# Patient Record
Sex: FEMALE | Race: WHITE | NOT HISPANIC OR LATINO
[De-identification: names, ages, dates, MRNs, and addresses within clinical notes are randomized per-mention and may not be internally consistent; named-entity substitution may affect disease eponyms.]

---

## 2017-01-31 ENCOUNTER — APPOINTMENT (OUTPATIENT)
Dept: SURGERY | Facility: CLINIC | Age: 77
End: 2017-01-31

## 2017-06-01 ENCOUNTER — APPOINTMENT (OUTPATIENT)
Dept: SURGERY | Facility: CLINIC | Age: 77
End: 2017-06-01

## 2017-06-19 ENCOUNTER — APPOINTMENT (OUTPATIENT)
Dept: SURGICAL ONCOLOGY | Facility: CLINIC | Age: 77
End: 2017-06-19

## 2017-06-19 VITALS
HEIGHT: 64 IN | BODY MASS INDEX: 31.58 KG/M2 | DIASTOLIC BLOOD PRESSURE: 83 MMHG | SYSTOLIC BLOOD PRESSURE: 180 MMHG | OXYGEN SATURATION: 98 % | WEIGHT: 185 LBS | HEART RATE: 71 BPM

## 2017-09-19 ENCOUNTER — APPOINTMENT (OUTPATIENT)
Dept: SURGERY | Facility: CLINIC | Age: 77
End: 2017-09-19
Payer: MEDICARE

## 2017-09-19 PROCEDURE — 99213 OFFICE O/P EST LOW 20 MIN: CPT

## 2017-11-28 ENCOUNTER — APPOINTMENT (OUTPATIENT)
Dept: SURGERY | Facility: CLINIC | Age: 77
End: 2017-11-28
Payer: MEDICARE

## 2017-11-28 PROCEDURE — 41100 BIOPSY OF TONGUE: CPT

## 2017-11-28 PROCEDURE — 99213 OFFICE O/P EST LOW 20 MIN: CPT | Mod: 25

## 2017-12-05 ENCOUNTER — OTHER (OUTPATIENT)
Age: 77
End: 2017-12-05

## 2017-12-12 ENCOUNTER — FORM ENCOUNTER (OUTPATIENT)
Age: 77
End: 2017-12-12

## 2017-12-13 ENCOUNTER — OUTPATIENT (OUTPATIENT)
Dept: OUTPATIENT SERVICES | Facility: HOSPITAL | Age: 77
LOS: 1 days | End: 2017-12-13
Payer: MEDICARE

## 2017-12-13 ENCOUNTER — APPOINTMENT (OUTPATIENT)
Dept: NUCLEAR MEDICINE | Facility: IMAGING CENTER | Age: 77
End: 2017-12-13
Payer: MEDICARE

## 2017-12-13 DIAGNOSIS — Z98.89 OTHER SPECIFIED POSTPROCEDURAL STATES: Chronic | ICD-10-CM

## 2017-12-13 DIAGNOSIS — Z00.8 ENCOUNTER FOR OTHER GENERAL EXAMINATION: ICD-10-CM

## 2017-12-13 DIAGNOSIS — T84.7XXA INFECTION AND INFLAMMATORY REACTION DUE TO OTHER INTERNAL ORTHOPEDIC PROSTHETIC DEVICES, IMPLANTS AND GRAFTS, INITIAL ENCOUNTER: Chronic | ICD-10-CM

## 2017-12-13 PROCEDURE — 78815 PET IMAGE W/CT SKULL-THIGH: CPT | Mod: 26,PI

## 2017-12-13 PROCEDURE — A9552: CPT

## 2017-12-13 PROCEDURE — 78815 PET IMAGE W/CT SKULL-THIGH: CPT

## 2017-12-15 ENCOUNTER — OTHER (OUTPATIENT)
Age: 77
End: 2017-12-15

## 2018-01-11 ENCOUNTER — OUTPATIENT (OUTPATIENT)
Dept: OUTPATIENT SERVICES | Facility: HOSPITAL | Age: 78
LOS: 1 days | End: 2018-01-11
Payer: MEDICARE

## 2018-01-11 VITALS
HEART RATE: 78 BPM | WEIGHT: 188.05 LBS | SYSTOLIC BLOOD PRESSURE: 162 MMHG | DIASTOLIC BLOOD PRESSURE: 86 MMHG | OXYGEN SATURATION: 98 % | HEIGHT: 61 IN | TEMPERATURE: 98 F | RESPIRATION RATE: 14 BRPM

## 2018-01-11 DIAGNOSIS — C02.9 MALIGNANT NEOPLASM OF TONGUE, UNSPECIFIED: ICD-10-CM

## 2018-01-11 DIAGNOSIS — T84.7XXA INFECTION AND INFLAMMATORY REACTION DUE TO OTHER INTERNAL ORTHOPEDIC PROSTHETIC DEVICES, IMPLANTS AND GRAFTS, INITIAL ENCOUNTER: Chronic | ICD-10-CM

## 2018-01-11 DIAGNOSIS — G47.33 OBSTRUCTIVE SLEEP APNEA (ADULT) (PEDIATRIC): ICD-10-CM

## 2018-01-11 DIAGNOSIS — Z86.69 PERSONAL HISTORY OF OTHER DISEASES OF THE NERVOUS SYSTEM AND SENSE ORGANS: Chronic | ICD-10-CM

## 2018-01-11 DIAGNOSIS — Z98.89 OTHER SPECIFIED POSTPROCEDURAL STATES: Chronic | ICD-10-CM

## 2018-01-11 DIAGNOSIS — Z98.890 OTHER SPECIFIED POSTPROCEDURAL STATES: Chronic | ICD-10-CM

## 2018-01-11 DIAGNOSIS — E03.9 HYPOTHYROIDISM, UNSPECIFIED: ICD-10-CM

## 2018-01-11 LAB
ALBUMIN SERPL ELPH-MCNC: 4.2 G/DL — SIGNIFICANT CHANGE UP (ref 3.3–5)
ALP SERPL-CCNC: 64 U/L — SIGNIFICANT CHANGE UP (ref 40–120)
ALT FLD-CCNC: 16 U/L — SIGNIFICANT CHANGE UP (ref 4–33)
AST SERPL-CCNC: 21 U/L — SIGNIFICANT CHANGE UP (ref 4–32)
BILIRUB SERPL-MCNC: 0.2 MG/DL — SIGNIFICANT CHANGE UP (ref 0.2–1.2)
BLD GP AB SCN SERPL QL: NEGATIVE — SIGNIFICANT CHANGE UP
BUN SERPL-MCNC: 25 MG/DL — HIGH (ref 7–23)
CALCIUM SERPL-MCNC: 9.3 MG/DL — SIGNIFICANT CHANGE UP (ref 8.4–10.5)
CHLORIDE SERPL-SCNC: 104 MMOL/L — SIGNIFICANT CHANGE UP (ref 98–107)
CO2 SERPL-SCNC: 23 MMOL/L — SIGNIFICANT CHANGE UP (ref 22–31)
CREAT SERPL-MCNC: 0.98 MG/DL — SIGNIFICANT CHANGE UP (ref 0.5–1.3)
GLUCOSE SERPL-MCNC: 80 MG/DL — SIGNIFICANT CHANGE UP (ref 70–99)
HCT VFR BLD CALC: 40.9 % — SIGNIFICANT CHANGE UP (ref 34.5–45)
HGB BLD-MCNC: 13.4 G/DL — SIGNIFICANT CHANGE UP (ref 11.5–15.5)
MCHC RBC-ENTMCNC: 28.3 PG — SIGNIFICANT CHANGE UP (ref 27–34)
MCHC RBC-ENTMCNC: 32.8 % — SIGNIFICANT CHANGE UP (ref 32–36)
MCV RBC AUTO: 86.3 FL — SIGNIFICANT CHANGE UP (ref 80–100)
NRBC # FLD: 0 — SIGNIFICANT CHANGE UP
PLATELET # BLD AUTO: 198 K/UL — SIGNIFICANT CHANGE UP (ref 150–400)
PMV BLD: 11.5 FL — SIGNIFICANT CHANGE UP (ref 7–13)
POTASSIUM SERPL-MCNC: 4.3 MMOL/L — SIGNIFICANT CHANGE UP (ref 3.5–5.3)
POTASSIUM SERPL-SCNC: 4.3 MMOL/L — SIGNIFICANT CHANGE UP (ref 3.5–5.3)
PROT SERPL-MCNC: 7.3 G/DL — SIGNIFICANT CHANGE UP (ref 6–8.3)
RBC # BLD: 4.74 M/UL — SIGNIFICANT CHANGE UP (ref 3.8–5.2)
RBC # FLD: 13.9 % — SIGNIFICANT CHANGE UP (ref 10.3–14.5)
RH IG SCN BLD-IMP: POSITIVE — SIGNIFICANT CHANGE UP
SODIUM SERPL-SCNC: 142 MMOL/L — SIGNIFICANT CHANGE UP (ref 135–145)
TSH SERPL-MCNC: 0.86 UIU/ML — SIGNIFICANT CHANGE UP (ref 0.27–4.2)
WBC # BLD: 6.6 K/UL — SIGNIFICANT CHANGE UP (ref 3.8–10.5)
WBC # FLD AUTO: 6.6 K/UL — SIGNIFICANT CHANGE UP (ref 3.8–10.5)

## 2018-01-11 PROCEDURE — 93010 ELECTROCARDIOGRAM REPORT: CPT

## 2018-01-11 NOTE — H&P PST ADULT - PSH
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

## 2018-01-11 NOTE — H&P PST ADULT - PMH
Basal Cell Cancer  biopsy of nose  5/2010  History of radiation therapy  2008  Hyperlipidemia    Hypothyroid    Malignant neoplasm of mandible  left- 2008  Malignant neoplasm of tongue    Oral Cancer    Radiation therapy complication  burn to left neck area Basal Cell Cancer  biopsy of nose  5/2010  History of radiation therapy  2008  Hyperlipidemia    Hypothyroid    Malignant neoplasm of mandible  left- 2008  Malignant neoplasm of tongue    Oral Cancer    Radiation dermatitis  left neck  Radiation therapy complication  burn to left neck area

## 2018-01-11 NOTE — H&P PST ADULT - MUSCULOSKELETAL
details… detailed exam normal strength/no joint swelling/no joint warmth/no joint erythema/no calf tenderness

## 2018-01-11 NOTE — H&P PST ADULT - HISTORY OF PRESENT ILLNESS
This is a 77 y.o female with history of This is a 77 y.o female with history of cancer of the mandible with follow-up radiation 2008 . Pt reported discomfort of the tongue , biopsy done 11/2017. Pt had PET/ CT scan done. Pt has malignant neoplasm of tongue , unspecified. Pt now for surgery .

## 2018-01-11 NOTE — H&P PST ADULT - MS GEN HX ROS MEA POS PC
neck pain/left neck - turning to left  - mild limitation neck pain/neck decreased ROM upon extension and turning to left

## 2018-01-11 NOTE — H&P PST ADULT - TEACHING/LEARNING LEARNING PREFERENCES
individual instruction/pictorial/verbal instruction/video/written material/skill demonstration/group instruction/audio/computer/internet

## 2018-01-11 NOTE — H&P PST ADULT - RS GEN PE MLT RESP DETAILS PC
no wheezes/clear to auscultation bilaterally/respirations non-labored/good air movement/no rhonchi/breath sounds equal/no rales

## 2018-01-19 ENCOUNTER — OTHER (OUTPATIENT)
Age: 78
End: 2018-01-19

## 2018-01-19 ENCOUNTER — TRANSCRIPTION ENCOUNTER (OUTPATIENT)
Age: 78
End: 2018-01-19

## 2018-01-19 ENCOUNTER — OUTPATIENT (OUTPATIENT)
Dept: OUTPATIENT SERVICES | Facility: HOSPITAL | Age: 78
LOS: 1 days | Discharge: HOME | End: 2018-01-19
Payer: MEDICARE

## 2018-01-19 ENCOUNTER — RESULT REVIEW (OUTPATIENT)
Age: 78
End: 2018-01-19

## 2018-01-19 ENCOUNTER — APPOINTMENT (OUTPATIENT)
Dept: SURGERY | Facility: HOSPITAL | Age: 78
End: 2018-01-19

## 2018-01-19 VITALS
OXYGEN SATURATION: 100 % | HEART RATE: 70 BPM | TEMPERATURE: 98 F | DIASTOLIC BLOOD PRESSURE: 70 MMHG | RESPIRATION RATE: 16 BRPM | SYSTOLIC BLOOD PRESSURE: 166 MMHG

## 2018-01-19 VITALS
HEIGHT: 61 IN | TEMPERATURE: 98 F | OXYGEN SATURATION: 98 % | DIASTOLIC BLOOD PRESSURE: 58 MMHG | SYSTOLIC BLOOD PRESSURE: 146 MMHG | RESPIRATION RATE: 14 BRPM | HEART RATE: 68 BPM | WEIGHT: 188.05 LBS

## 2018-01-19 DIAGNOSIS — T84.7XXA INFECTION AND INFLAMMATORY REACTION DUE TO OTHER INTERNAL ORTHOPEDIC PROSTHETIC DEVICES, IMPLANTS AND GRAFTS, INITIAL ENCOUNTER: Chronic | ICD-10-CM

## 2018-01-19 DIAGNOSIS — C02.9 MALIGNANT NEOPLASM OF TONGUE, UNSPECIFIED: ICD-10-CM

## 2018-01-19 DIAGNOSIS — Z86.69 PERSONAL HISTORY OF OTHER DISEASES OF THE NERVOUS SYSTEM AND SENSE ORGANS: Chronic | ICD-10-CM

## 2018-01-19 DIAGNOSIS — Z98.89 OTHER SPECIFIED POSTPROCEDURAL STATES: Chronic | ICD-10-CM

## 2018-01-19 DIAGNOSIS — Z98.890 OTHER SPECIFIED POSTPROCEDURAL STATES: Chronic | ICD-10-CM

## 2018-01-19 PROCEDURE — 88309 TISSUE EXAM BY PATHOLOGIST: CPT | Mod: 26

## 2018-01-19 PROCEDURE — 13132 CMPLX RPR F/C/C/M/N/AX/G/H/F: CPT | Mod: 59

## 2018-01-19 PROCEDURE — 41120 PARTIAL REMOVAL OF TONGUE: CPT

## 2018-01-19 PROCEDURE — 41120 PARTIAL REMOVAL OF TONGUE: CPT | Mod: AS

## 2018-01-19 RX ORDER — OXYCODONE AND ACETAMINOPHEN 5; 325 MG/1; MG/1
1 TABLET ORAL
Qty: 20 | Refills: 0
Start: 2018-01-19 | End: 2018-01-23

## 2018-01-19 NOTE — ASU DISCHARGE PLAN (ADULT/PEDIATRIC). - NOTIFY
Bleeding that does not stop/Fever greater than 101/Swelling that continues/Persistent Nausea and Vomiting/Pain not relieved by Medications

## 2018-01-19 NOTE — ASU PREOPERATIVE ASSESSMENT, ADULT ([facility] ONLY) - TEACHING/LEARNING LEARNING PREFERENCES
audio/verbal instruction/individual instruction/written material/group instruction/pictorial/skill demonstration/computer/internet/video

## 2018-01-19 NOTE — BRIEF OPERATIVE NOTE - PROCEDURE
<<-----Click on this checkbox to enter Procedure Partial glossectomy on left  01/19/2018    Active  MONIKAL

## 2018-01-19 NOTE — PROGRESS NOTE ADULT - SUBJECTIVE AND OBJECTIVE BOX
Patient scheduled for resection Tongue mass-Seen in office-she is at LOW cardiac risk with no cardiac contraindications for the procedure.

## 2018-01-23 ENCOUNTER — APPOINTMENT (OUTPATIENT)
Dept: SURGERY | Facility: CLINIC | Age: 78
End: 2018-01-23

## 2018-01-23 LAB — SURGICAL PATHOLOGY STUDY: SIGNIFICANT CHANGE UP

## 2018-01-25 ENCOUNTER — APPOINTMENT (OUTPATIENT)
Dept: SURGERY | Facility: CLINIC | Age: 78
End: 2018-01-25
Payer: MEDICARE

## 2018-01-25 PROCEDURE — 99024 POSTOP FOLLOW-UP VISIT: CPT

## 2018-02-01 ENCOUNTER — APPOINTMENT (OUTPATIENT)
Dept: SURGERY | Facility: CLINIC | Age: 78
End: 2018-02-01
Payer: MEDICARE

## 2018-02-01 PROCEDURE — 99024 POSTOP FOLLOW-UP VISIT: CPT

## 2018-05-29 ENCOUNTER — APPOINTMENT (OUTPATIENT)
Dept: SURGERY | Facility: CLINIC | Age: 78
End: 2018-05-29
Payer: MEDICARE

## 2018-05-29 PROCEDURE — 99213 OFFICE O/P EST LOW 20 MIN: CPT

## 2018-06-25 ENCOUNTER — APPOINTMENT (OUTPATIENT)
Dept: SURGICAL ONCOLOGY | Facility: CLINIC | Age: 78
End: 2018-06-25
Payer: MEDICARE

## 2018-06-25 VITALS
OXYGEN SATURATION: 99 % | WEIGHT: 181 LBS | DIASTOLIC BLOOD PRESSURE: 81 MMHG | SYSTOLIC BLOOD PRESSURE: 187 MMHG | HEART RATE: 76 BPM | BODY MASS INDEX: 30.9 KG/M2 | HEIGHT: 64 IN

## 2018-06-25 PROCEDURE — 99213 OFFICE O/P EST LOW 20 MIN: CPT

## 2018-07-16 PROBLEM — L58.9 RADIODERMATITIS, UNSPECIFIED: Chronic | Status: ACTIVE | Noted: 2018-01-11

## 2018-07-17 ENCOUNTER — APPOINTMENT (OUTPATIENT)
Dept: SURGERY | Facility: CLINIC | Age: 78
End: 2018-07-17
Payer: MEDICARE

## 2018-07-17 PROCEDURE — 99213 OFFICE O/P EST LOW 20 MIN: CPT

## 2018-09-06 ENCOUNTER — APPOINTMENT (OUTPATIENT)
Dept: SURGERY | Facility: CLINIC | Age: 78
End: 2018-09-06
Payer: MEDICARE

## 2018-09-06 PROCEDURE — 99213 OFFICE O/P EST LOW 20 MIN: CPT

## 2018-10-02 ENCOUNTER — OTHER (OUTPATIENT)
Age: 78
End: 2018-10-02

## 2018-10-16 ENCOUNTER — APPOINTMENT (OUTPATIENT)
Dept: VASCULAR SURGERY | Facility: CLINIC | Age: 78
End: 2018-10-16
Payer: MEDICARE

## 2018-10-16 VITALS
HEIGHT: 64 IN | DIASTOLIC BLOOD PRESSURE: 86 MMHG | HEART RATE: 69 BPM | SYSTOLIC BLOOD PRESSURE: 188 MMHG | WEIGHT: 180 LBS | TEMPERATURE: 98 F | BODY MASS INDEX: 30.73 KG/M2

## 2018-10-16 DIAGNOSIS — L85.3 XEROSIS CUTIS: ICD-10-CM

## 2018-10-16 PROCEDURE — 93880 EXTRACRANIAL BILAT STUDY: CPT

## 2018-10-16 PROCEDURE — 99203 OFFICE O/P NEW LOW 30 MIN: CPT

## 2018-10-16 RX ORDER — AMMONIUM LACTATE 12 %
12 CREAM (GRAM) TOPICAL TWICE DAILY
Qty: 280 | Refills: 6 | Status: ACTIVE | COMMUNITY
Start: 2018-10-16 | End: 1900-01-01

## 2018-10-16 RX ORDER — GUAIFENESIN 600 MG/1
600 TABLET, MULTILAYER, EXTENDED RELEASE ORAL
Qty: 20 | Refills: 0 | Status: COMPLETED | COMMUNITY
Start: 2017-05-01 | End: 2018-10-16

## 2018-10-16 RX ORDER — OXYCODONE AND ACETAMINOPHEN 5; 325 MG/1; MG/1
5-325 TABLET ORAL
Qty: 20 | Refills: 0 | Status: COMPLETED | COMMUNITY
Start: 2018-01-19 | End: 2018-10-16

## 2018-10-16 RX ORDER — GABAPENTIN 100 MG/1
100 CAPSULE ORAL
Qty: 60 | Refills: 0 | Status: COMPLETED | COMMUNITY
Start: 2017-09-12 | End: 2018-10-16

## 2018-10-16 RX ORDER — FLUOROMETHOLONE 1 MG/G
0.1 OINTMENT OPHTHALMIC
Qty: 4 | Refills: 0 | Status: COMPLETED | COMMUNITY
Start: 2017-08-02 | End: 2018-10-16

## 2018-10-16 RX ORDER — PREDNISOLONE ACETATE 10 MG/ML
1 SUSPENSION/ DROPS OPHTHALMIC
Qty: 10 | Refills: 0 | Status: COMPLETED | COMMUNITY
Start: 2017-07-13 | End: 2018-10-16

## 2018-10-16 RX ORDER — AZITHROMYCIN 250 MG/1
250 TABLET, FILM COATED ORAL
Qty: 6 | Refills: 0 | Status: COMPLETED | COMMUNITY
Start: 2017-05-01 | End: 2018-10-16

## 2018-10-16 RX ORDER — CIPROFLOXACIN 3 MG/ML
0.3 SOLUTION OPHTHALMIC
Qty: 5 | Refills: 0 | Status: COMPLETED | COMMUNITY
Start: 2017-05-01 | End: 2018-10-16

## 2018-10-16 RX ORDER — DICLOFENAC POTASSIUM 50 MG/1
50 TABLET, COATED ORAL
Qty: 28 | Refills: 0 | Status: COMPLETED | COMMUNITY
Start: 2017-04-05 | End: 2018-10-16

## 2018-10-17 ENCOUNTER — APPOINTMENT (OUTPATIENT)
Dept: NEUROSURGERY | Facility: CLINIC | Age: 78
End: 2018-10-17
Payer: MEDICARE

## 2018-10-17 VITALS
WEIGHT: 180 LBS | HEIGHT: 64 IN | TEMPERATURE: 97.8 F | HEART RATE: 74 BPM | OXYGEN SATURATION: 95 % | SYSTOLIC BLOOD PRESSURE: 160 MMHG | RESPIRATION RATE: 15 BRPM | DIASTOLIC BLOOD PRESSURE: 97 MMHG | BODY MASS INDEX: 30.73 KG/M2

## 2018-10-17 DIAGNOSIS — I67.1 CEREBRAL ANEURYSM, NONRUPTURED: ICD-10-CM

## 2018-10-17 DIAGNOSIS — Z86.79 PERSONAL HISTORY OF OTHER DISEASES OF THE CIRCULATORY SYSTEM: ICD-10-CM

## 2018-10-17 PROCEDURE — 99205 OFFICE O/P NEW HI 60 MIN: CPT

## 2018-10-17 RX ORDER — ASPIRIN 81 MG
81 TABLET, DELAYED RELEASE (ENTERIC COATED) ORAL
Refills: 0 | Status: ACTIVE | COMMUNITY

## 2018-10-17 RX ORDER — LOSARTAN POTASSIUM 50 MG/1
50 TABLET, FILM COATED ORAL
Refills: 0 | Status: ACTIVE | COMMUNITY

## 2018-10-23 ENCOUNTER — APPOINTMENT (OUTPATIENT)
Dept: SURGERY | Facility: CLINIC | Age: 78
End: 2018-10-23
Payer: MEDICARE

## 2018-10-23 PROCEDURE — 99213 OFFICE O/P EST LOW 20 MIN: CPT

## 2018-12-13 ENCOUNTER — LABORATORY RESULT (OUTPATIENT)
Age: 78
End: 2018-12-13

## 2018-12-13 ENCOUNTER — APPOINTMENT (OUTPATIENT)
Dept: SURGERY | Facility: CLINIC | Age: 78
End: 2018-12-13
Payer: MEDICARE

## 2018-12-13 PROCEDURE — 11100 BX SKIN SUBCUTANEOUS&/MUCOUS MEMBRANE 1 LESION: CPT

## 2018-12-13 PROCEDURE — 99213 OFFICE O/P EST LOW 20 MIN: CPT | Mod: 25

## 2018-12-18 ENCOUNTER — OTHER (OUTPATIENT)
Age: 78
End: 2018-12-18

## 2019-01-15 ENCOUNTER — APPOINTMENT (OUTPATIENT)
Dept: VASCULAR SURGERY | Facility: CLINIC | Age: 79
End: 2019-01-15
Payer: MEDICARE

## 2019-01-15 VITALS
SYSTOLIC BLOOD PRESSURE: 184 MMHG | HEART RATE: 76 BPM | HEIGHT: 64 IN | TEMPERATURE: 97.9 F | WEIGHT: 180 LBS | BODY MASS INDEX: 30.73 KG/M2 | DIASTOLIC BLOOD PRESSURE: 72 MMHG

## 2019-01-15 PROCEDURE — 93880 EXTRACRANIAL BILAT STUDY: CPT

## 2019-01-15 PROCEDURE — 99213 OFFICE O/P EST LOW 20 MIN: CPT

## 2019-01-28 ENCOUNTER — TRANSCRIPTION ENCOUNTER (OUTPATIENT)
Age: 79
End: 2019-01-28

## 2019-01-31 ENCOUNTER — APPOINTMENT (OUTPATIENT)
Dept: SURGERY | Facility: CLINIC | Age: 79
End: 2019-01-31
Payer: MEDICARE

## 2019-01-31 PROCEDURE — 99213 OFFICE O/P EST LOW 20 MIN: CPT

## 2019-01-31 NOTE — HISTORY OF PRESENT ILLNESS
[de-identified] : 12    months  s/p partial glossectomy. denies pain, bleeding, dysphagia, hoarseness or new lesions.   no changes medically since last visit

## 2019-01-31 NOTE — PHYSICAL EXAM
[de-identified] : changes from prior surgery.   no palpable lesions.   [de-identified] : no palpable thyroid nodules [Laryngoscopy Performed] : laryngoscopy was performed, see procedure section for findings [Midline] : located in midline position [de-identified] : no evidence of recurrent disease. no new lesions noted [de-identified] : changes from prior surgery.  white changes right lower gingiva with no substance [Normal] : orientation to person, place, and time: normal

## 2019-05-23 ENCOUNTER — APPOINTMENT (OUTPATIENT)
Dept: SURGERY | Facility: CLINIC | Age: 79
End: 2019-05-23
Payer: MEDICARE

## 2019-05-23 PROCEDURE — 99213 OFFICE O/P EST LOW 20 MIN: CPT

## 2019-05-23 NOTE — PHYSICAL EXAM
[de-identified] : changes from prior surgery.   no palpable lesions.   [de-identified] : no palpable thyroid nodules [Laryngoscopy Performed] : laryngoscopy was performed, see procedure section for findings [Midline] : located in midline position [de-identified] : no evidence of recurrent disease. no new lesions noted [de-identified] : changes from prior surgery.  white changes right lower gingiva with no substance - no change since last visit [Normal] : orientation to person, place, and time: normal

## 2019-05-23 NOTE — ASSESSMENT
[FreeTextEntry1] : will observe. to return earlier if any change.   CT to r/o soft tissue mass causing discomfort. to call next week for results.

## 2019-05-23 NOTE — HISTORY OF PRESENT ILLNESS
[de-identified] : 16    months  s/p partial glossectomy. denies pain, bleeding, dysphagia, hoarseness or new lesions.   no changes medically since last visit .  notes left jaw and temple pressure

## 2019-05-29 ENCOUNTER — APPOINTMENT (OUTPATIENT)
Dept: VASCULAR SURGERY | Facility: CLINIC | Age: 79
End: 2019-05-29
Payer: MEDICARE

## 2019-05-29 VITALS
HEART RATE: 74 BPM | DIASTOLIC BLOOD PRESSURE: 83 MMHG | WEIGHT: 180 LBS | SYSTOLIC BLOOD PRESSURE: 168 MMHG | BODY MASS INDEX: 30.73 KG/M2 | TEMPERATURE: 98.5 F | HEIGHT: 64 IN

## 2019-05-29 PROCEDURE — 99213 OFFICE O/P EST LOW 20 MIN: CPT

## 2019-05-29 PROCEDURE — 93880 EXTRACRANIAL BILAT STUDY: CPT

## 2019-06-03 ENCOUNTER — FORM ENCOUNTER (OUTPATIENT)
Age: 79
End: 2019-06-03

## 2019-06-04 ENCOUNTER — OUTPATIENT (OUTPATIENT)
Dept: OUTPATIENT SERVICES | Facility: HOSPITAL | Age: 79
LOS: 1 days | End: 2019-06-04
Payer: MEDICARE

## 2019-06-04 ENCOUNTER — APPOINTMENT (OUTPATIENT)
Dept: CT IMAGING | Facility: IMAGING CENTER | Age: 79
End: 2019-06-04
Payer: MEDICARE

## 2019-06-04 DIAGNOSIS — Z98.890 OTHER SPECIFIED POSTPROCEDURAL STATES: Chronic | ICD-10-CM

## 2019-06-04 DIAGNOSIS — T84.7XXA INFECTION AND INFLAMMATORY REACTION DUE TO OTHER INTERNAL ORTHOPEDIC PROSTHETIC DEVICES, IMPLANTS AND GRAFTS, INITIAL ENCOUNTER: Chronic | ICD-10-CM

## 2019-06-04 DIAGNOSIS — C02.9 MALIGNANT NEOPLASM OF TONGUE, UNSPECIFIED: ICD-10-CM

## 2019-06-04 DIAGNOSIS — Z98.89 OTHER SPECIFIED POSTPROCEDURAL STATES: Chronic | ICD-10-CM

## 2019-06-04 DIAGNOSIS — Z86.69 PERSONAL HISTORY OF OTHER DISEASES OF THE NERVOUS SYSTEM AND SENSE ORGANS: Chronic | ICD-10-CM

## 2019-06-04 PROCEDURE — 70491 CT SOFT TISSUE NECK W/DYE: CPT | Mod: 26

## 2019-06-04 PROCEDURE — 70491 CT SOFT TISSUE NECK W/DYE: CPT

## 2019-06-11 ENCOUNTER — OTHER (OUTPATIENT)
Age: 79
End: 2019-06-11

## 2019-06-18 ENCOUNTER — TRANSCRIPTION ENCOUNTER (OUTPATIENT)
Age: 79
End: 2019-06-18

## 2019-06-19 ENCOUNTER — FORM ENCOUNTER (OUTPATIENT)
Age: 79
End: 2019-06-19

## 2019-06-20 ENCOUNTER — OUTPATIENT (OUTPATIENT)
Dept: OUTPATIENT SERVICES | Facility: HOSPITAL | Age: 79
LOS: 1 days | End: 2019-06-20
Payer: MEDICARE

## 2019-06-20 ENCOUNTER — APPOINTMENT (OUTPATIENT)
Dept: ULTRASOUND IMAGING | Facility: IMAGING CENTER | Age: 79
End: 2019-06-20
Payer: MEDICARE

## 2019-06-20 ENCOUNTER — APPOINTMENT (OUTPATIENT)
Dept: SURGERY | Facility: CLINIC | Age: 79
End: 2019-06-20
Payer: MEDICARE

## 2019-06-20 DIAGNOSIS — T84.7XXA INFECTION AND INFLAMMATORY REACTION DUE TO OTHER INTERNAL ORTHOPEDIC PROSTHETIC DEVICES, IMPLANTS AND GRAFTS, INITIAL ENCOUNTER: Chronic | ICD-10-CM

## 2019-06-20 DIAGNOSIS — Z98.890 OTHER SPECIFIED POSTPROCEDURAL STATES: Chronic | ICD-10-CM

## 2019-06-20 DIAGNOSIS — Z86.69 PERSONAL HISTORY OF OTHER DISEASES OF THE NERVOUS SYSTEM AND SENSE ORGANS: Chronic | ICD-10-CM

## 2019-06-20 DIAGNOSIS — Z98.89 OTHER SPECIFIED POSTPROCEDURAL STATES: Chronic | ICD-10-CM

## 2019-06-20 DIAGNOSIS — R22.1 LOCALIZED SWELLING, MASS AND LUMP, NECK: ICD-10-CM

## 2019-06-20 PROCEDURE — 36415 COLL VENOUS BLD VENIPUNCTURE: CPT

## 2019-06-20 PROCEDURE — 76536 US EXAM OF HEAD AND NECK: CPT

## 2019-06-20 PROCEDURE — 76536 US EXAM OF HEAD AND NECK: CPT | Mod: 26

## 2019-06-21 LAB
T3 SERPL-MCNC: 83 NG/DL
T4 FREE SERPL-MCNC: 1.7 NG/DL
TSH SERPL-ACNC: 0.45 UIU/ML

## 2019-06-23 LAB
24R-OH-CALCIDIOL SERPL-MCNC: 40.7 PG/ML
CALCIUM SERPL-MCNC: 9.9 MG/DL
CALCIUM SERPL-MCNC: 9.9 MG/DL
PARATHYROID HORMONE INTACT: 58 PG/ML
THYROGLOB AB SERPL-ACNC: <20 IU/ML
THYROPEROXIDASE AB SERPL IA-ACNC: 69.8 IU/ML

## 2019-06-24 ENCOUNTER — APPOINTMENT (OUTPATIENT)
Dept: SURGICAL ONCOLOGY | Facility: CLINIC | Age: 79
End: 2019-06-24
Payer: MEDICARE

## 2019-06-24 VITALS
DIASTOLIC BLOOD PRESSURE: 80 MMHG | RESPIRATION RATE: 15 BRPM | SYSTOLIC BLOOD PRESSURE: 179 MMHG | TEMPERATURE: 97.5 F | HEIGHT: 64 IN | HEART RATE: 73 BPM | OXYGEN SATURATION: 96 %

## 2019-06-24 DIAGNOSIS — N60.99 UNSPECIFIED BENIGN MAMMARY DYSPLASIA OF UNSPECIFIED BREAST: ICD-10-CM

## 2019-06-24 PROCEDURE — 99214 OFFICE O/P EST MOD 30 MIN: CPT

## 2019-06-25 ENCOUNTER — OTHER (OUTPATIENT)
Age: 79
End: 2019-06-25

## 2019-07-08 ENCOUNTER — FORM ENCOUNTER (OUTPATIENT)
Age: 79
End: 2019-07-08

## 2019-07-09 ENCOUNTER — RESULT REVIEW (OUTPATIENT)
Age: 79
End: 2019-07-09

## 2019-07-09 ENCOUNTER — APPOINTMENT (OUTPATIENT)
Dept: ULTRASOUND IMAGING | Facility: IMAGING CENTER | Age: 79
End: 2019-07-09
Payer: MEDICARE

## 2019-07-09 ENCOUNTER — OUTPATIENT (OUTPATIENT)
Dept: OUTPATIENT SERVICES | Facility: HOSPITAL | Age: 79
LOS: 1 days | End: 2019-07-09
Payer: MEDICARE

## 2019-07-09 DIAGNOSIS — R22.1 LOCALIZED SWELLING, MASS AND LUMP, NECK: ICD-10-CM

## 2019-07-09 DIAGNOSIS — Z98.89 OTHER SPECIFIED POSTPROCEDURAL STATES: Chronic | ICD-10-CM

## 2019-07-09 DIAGNOSIS — Z98.890 OTHER SPECIFIED POSTPROCEDURAL STATES: Chronic | ICD-10-CM

## 2019-07-09 DIAGNOSIS — T84.7XXA INFECTION AND INFLAMMATORY REACTION DUE TO OTHER INTERNAL ORTHOPEDIC PROSTHETIC DEVICES, IMPLANTS AND GRAFTS, INITIAL ENCOUNTER: Chronic | ICD-10-CM

## 2019-07-09 DIAGNOSIS — Z86.69 PERSONAL HISTORY OF OTHER DISEASES OF THE NERVOUS SYSTEM AND SENSE ORGANS: Chronic | ICD-10-CM

## 2019-07-09 PROCEDURE — 10005 FNA BX W/US GDN 1ST LES: CPT

## 2019-07-16 LAB — NON-GYNECOLOGICAL CYTOLOGY STUDY: SIGNIFICANT CHANGE UP

## 2019-07-18 ENCOUNTER — OTHER (OUTPATIENT)
Age: 79
End: 2019-07-18

## 2019-07-25 ENCOUNTER — APPOINTMENT (OUTPATIENT)
Dept: SURGERY | Facility: CLINIC | Age: 79
End: 2019-07-25
Payer: MEDICARE

## 2019-07-25 PROCEDURE — 99213 OFFICE O/P EST LOW 20 MIN: CPT

## 2019-07-25 NOTE — PHYSICAL EXAM
[de-identified] : changes from prior surgery.   no palpable lesions.   [de-identified] : no palpable thyroid nodules [Laryngoscopy Performed] : laryngoscopy was performed, see procedure section for findings [Midline] : located in midline position [de-identified] : no evidence of recurrent disease. no new lesions noted [de-identified] : changes from prior surgery.  white changes right lower gingiva with no substance - no change since last visit.  1 mm area exposed bone left floor of mouth [Normal] : orientation to person, place, and time: normal

## 2019-07-25 NOTE — ASSESSMENT
[FreeTextEntry1] : will observe. to return earlier if any change.  asked to be evaluated by dr hernández

## 2019-07-25 NOTE — HISTORY OF PRESENT ILLNESS
[de-identified] : 18     months  s/p partial glossectomy. denies pain, bleeding, dysphagia, hoarseness or new lesions.   no changes medically since last visit .  notes left jaw and temple pressure  and new floor of mouth lesion

## 2019-08-05 NOTE — HISTORY OF PRESENT ILLNESS
[de-identified] : 78 year-old lady who we have followed for periodic breast examinations since March 2000. \par I have been seeing her since March 2001.\par We currently see her annually.\par \par +FH:\par A paternal aunt had breast cancer.\par \par A right breast biopsy in 1984 was benign.\par \par She has no signs or symptoms related to either breast\par \par Her menarche was at age 12.\par She has had 4 pregnancies, with 3 deliveries, the first at age 25.\par \par Breast cancer risk is 12.4\par \par Her internist is Anthony Bobby\par \par She has bilateral carotid artery stenosis.\par She sees Dr. Britton Polanco (vascular surgery).\par She is on medications for hyperlipidemia.\par Her cervical anatomy makes either open surgical, or endovascular approach technically difficult.\par \par Baby aspirin daily\par Blood pressure is controlled with losartan.\par Simvastatin for hyperlipidemia\par She also takes Synthroid.\par \par She is seeing Dr. Edis Tanner (neurosurgery) for a basal artery aneurysm, and a meningioma, both of which are being followed clinically.\par Her neurologist is Dr. Radha Swartz\par \par \par +TONGUE CANCER (Below)\par She sees Dr. Andrews Ewing, May 2019 visit was unremarkable\par PET scan pending\par \par Her gynecologist is Dr. Selena ANDRE, reluctant to f/u\par \par Her father had colorectal cancer.\par Her colonoscopy, declines\par \par In July 2008 she had a partial glossectomy by Dr. Ewing and Maximo, at [facility].\par Postoperative chemotherapy was administered by Dr. Flynn.\par She had irradiation that [facility], but does not recall the physician's name\par January 2018 she had surgery for a recurrence of her total cancer; she did not require any postoperative treatment.

## 2019-08-05 NOTE — ASSESSMENT
[FreeTextEntry1] : Annual breast imaging was at [facility],..: 6-11-18: BI-RADS 2:\par June 2019 imaging was unremarkable, await official report..: 06/12/19:\par Bilateral mammogram and breast sono at [facility]: BI-RADS 2\par Prescription provided for June 2020\par \par Clinically she appears to be doing well and asked to see her in another year, sooner if needed

## 2019-08-05 NOTE — REVIEW OF SYSTEMS
[Negative] : Heme/Lymph [FreeTextEntry4] : Tongue cancer [de-identified] : Meningioma [FreeTextEntry5] : Hypertension

## 2019-08-05 NOTE — PHYSICAL EXAM
[Normal] : supple, no neck mass and thyroid not enlarged [Normal Supraclavicular Lymph Nodes] : normal supraclavicular lymph nodes [Normal Neck Lymph Nodes] : normal neck lymph nodes  [Normal Axillary Lymph Nodes] : normal axillary lymph nodes [Normal] : full range of motion and no deformities appreciated [de-identified] : Groins not examined [de-identified] : Below

## 2019-10-03 ENCOUNTER — APPOINTMENT (OUTPATIENT)
Dept: SURGERY | Facility: CLINIC | Age: 79
End: 2019-10-03
Payer: MEDICARE

## 2019-10-03 PROCEDURE — 99213 OFFICE O/P EST LOW 20 MIN: CPT

## 2019-10-03 NOTE — HISTORY OF PRESENT ILLNESS
[de-identified] : 21     months  s/p partial glossectomy. denies pain, bleeding, dysphagia, hoarseness or new lesions.   no changes medically since last visit .  jaw discomfort due to loose segment of bone per dr hernández

## 2019-10-03 NOTE — PHYSICAL EXAM
[de-identified] : changes from prior surgery.   no palpable lesions.   [de-identified] : no palpable thyroid nodules [Laryngoscopy Performed] : laryngoscopy was performed, see procedure section for findings [Midline] : located in midline position [de-identified] : no evidence of recurrent disease. no new lesions noted [de-identified] : changes from prior surgery.  white changes right lower gingiva with no substance - no change since last visit.  1 mm area exposed bone left floor of mouth.  bone segment mobile [Normal] : orientation to person, place, and time: normal

## 2019-10-16 ENCOUNTER — APPOINTMENT (OUTPATIENT)
Dept: VASCULAR SURGERY | Facility: CLINIC | Age: 79
End: 2019-10-16
Payer: MEDICARE

## 2019-10-16 VITALS
DIASTOLIC BLOOD PRESSURE: 80 MMHG | SYSTOLIC BLOOD PRESSURE: 207 MMHG | HEIGHT: 64 IN | HEART RATE: 71 BPM | BODY MASS INDEX: 32.44 KG/M2 | WEIGHT: 190 LBS

## 2019-10-16 PROCEDURE — 93880 EXTRACRANIAL BILAT STUDY: CPT

## 2019-10-16 PROCEDURE — 99213 OFFICE O/P EST LOW 20 MIN: CPT

## 2019-12-12 ENCOUNTER — OTHER (OUTPATIENT)
Age: 79
End: 2019-12-12

## 2020-01-05 ENCOUNTER — FORM ENCOUNTER (OUTPATIENT)
Age: 80
End: 2020-01-05

## 2020-01-06 ENCOUNTER — APPOINTMENT (OUTPATIENT)
Dept: CT IMAGING | Facility: IMAGING CENTER | Age: 80
End: 2020-01-06
Payer: MEDICARE

## 2020-01-06 ENCOUNTER — OUTPATIENT (OUTPATIENT)
Dept: OUTPATIENT SERVICES | Facility: HOSPITAL | Age: 80
LOS: 1 days | End: 2020-01-06
Payer: MEDICARE

## 2020-01-06 DIAGNOSIS — C02.9 MALIGNANT NEOPLASM OF TONGUE, UNSPECIFIED: ICD-10-CM

## 2020-01-06 DIAGNOSIS — Z98.89 OTHER SPECIFIED POSTPROCEDURAL STATES: Chronic | ICD-10-CM

## 2020-01-06 DIAGNOSIS — T84.7XXA INFECTION AND INFLAMMATORY REACTION DUE TO OTHER INTERNAL ORTHOPEDIC PROSTHETIC DEVICES, IMPLANTS AND GRAFTS, INITIAL ENCOUNTER: Chronic | ICD-10-CM

## 2020-01-06 DIAGNOSIS — Z98.890 OTHER SPECIFIED POSTPROCEDURAL STATES: Chronic | ICD-10-CM

## 2020-01-06 DIAGNOSIS — Z86.69 PERSONAL HISTORY OF OTHER DISEASES OF THE NERVOUS SYSTEM AND SENSE ORGANS: Chronic | ICD-10-CM

## 2020-01-06 PROCEDURE — 70491 CT SOFT TISSUE NECK W/DYE: CPT | Mod: 26

## 2020-01-06 PROCEDURE — 82565 ASSAY OF CREATININE: CPT

## 2020-01-06 PROCEDURE — 70491 CT SOFT TISSUE NECK W/DYE: CPT

## 2020-01-09 ENCOUNTER — APPOINTMENT (OUTPATIENT)
Dept: SURGERY | Facility: CLINIC | Age: 80
End: 2020-01-09
Payer: MEDICARE

## 2020-01-09 PROCEDURE — 99213 OFFICE O/P EST LOW 20 MIN: CPT

## 2020-01-09 NOTE — HISTORY OF PRESENT ILLNESS
[de-identified] : 24     months  s/p partial glossectomy. denies pain, bleeding, dysphagia, hoarseness or new lesions.   no changes medically since last visit .  jaw discomfort due to loose segment of bone per dr hernández. recent CT stable

## 2020-01-09 NOTE — PHYSICAL EXAM
[de-identified] : no palpable thyroid nodules [de-identified] : changes from prior surgery.   no palpable lesions.   [Laryngoscopy Performed] : laryngoscopy was performed, see procedure section for findings [Midline] : located in midline position [de-identified] : no evidence of recurrent disease. no new lesions noted [de-identified] : changes from prior surgery.  white changes right lower gingiva with no substance - no change since last visit.  1 mm area exposed bone left floor of mouth.  bone segment mobile [Normal] : orientation to person, place, and time: normal

## 2020-01-27 ENCOUNTER — APPOINTMENT (OUTPATIENT)
Dept: VASCULAR SURGERY | Facility: CLINIC | Age: 80
End: 2020-01-27
Payer: MEDICARE

## 2020-01-27 VITALS
TEMPERATURE: 98.4 F | HEART RATE: 70 BPM | DIASTOLIC BLOOD PRESSURE: 98 MMHG | BODY MASS INDEX: 31.92 KG/M2 | WEIGHT: 187 LBS | HEIGHT: 64 IN | SYSTOLIC BLOOD PRESSURE: 194 MMHG

## 2020-01-27 PROCEDURE — 93880 EXTRACRANIAL BILAT STUDY: CPT

## 2020-01-27 PROCEDURE — 99213 OFFICE O/P EST LOW 20 MIN: CPT

## 2020-02-04 ENCOUNTER — TRANSCRIPTION ENCOUNTER (OUTPATIENT)
Age: 80
End: 2020-02-04

## 2020-06-08 ENCOUNTER — APPOINTMENT (OUTPATIENT)
Dept: VASCULAR SURGERY | Facility: CLINIC | Age: 80
End: 2020-06-08

## 2020-06-25 ENCOUNTER — APPOINTMENT (OUTPATIENT)
Dept: SURGERY | Facility: CLINIC | Age: 80
End: 2020-06-25
Payer: MEDICARE

## 2020-06-25 PROCEDURE — 99213 OFFICE O/P EST LOW 20 MIN: CPT

## 2020-06-25 NOTE — HISTORY OF PRESENT ILLNESS
[de-identified] : 30   months  s/p partial glossectomy. denies pain, bleeding, dysphagia, hoarseness or new lesions.   no changes medically since last visit .  jaw discomfort due to loose segment of bone per dr hernández. last CT stable

## 2020-06-25 NOTE — PHYSICAL EXAM
[de-identified] : changes from prior surgery.   no palpable lesions.   [de-identified] : no palpable thyroid nodules [Midline] : located in midline position [Laryngoscopy Performed] : laryngoscopy was performed, see procedure section for findings [de-identified] : no evidence of recurrent disease. no new lesions noted [de-identified] : changes from prior surgery.  white changes right lower gingiva with no substance - no change since last visit.  1 mm area exposed bone left floor of mouth.  bone segment mobile [Normal] : orientation to person, place, and time: normal

## 2020-06-25 NOTE — ASSESSMENT
[FreeTextEntry1] : will observe. to return earlier if any change.  CT requested. to call next week for results.

## 2020-06-29 ENCOUNTER — APPOINTMENT (OUTPATIENT)
Dept: SURGICAL ONCOLOGY | Facility: CLINIC | Age: 80
End: 2020-06-29

## 2020-07-06 ENCOUNTER — APPOINTMENT (OUTPATIENT)
Dept: CT IMAGING | Facility: IMAGING CENTER | Age: 80
End: 2020-07-06
Payer: MEDICARE

## 2020-07-06 ENCOUNTER — OUTPATIENT (OUTPATIENT)
Dept: OUTPATIENT SERVICES | Facility: HOSPITAL | Age: 80
LOS: 1 days | End: 2020-07-06
Payer: MEDICARE

## 2020-07-06 DIAGNOSIS — Z98.890 OTHER SPECIFIED POSTPROCEDURAL STATES: Chronic | ICD-10-CM

## 2020-07-06 DIAGNOSIS — C03.9 MALIGNANT NEOPLASM OF GUM, UNSPECIFIED: ICD-10-CM

## 2020-07-06 DIAGNOSIS — T84.7XXA INFECTION AND INFLAMMATORY REACTION DUE TO OTHER INTERNAL ORTHOPEDIC PROSTHETIC DEVICES, IMPLANTS AND GRAFTS, INITIAL ENCOUNTER: Chronic | ICD-10-CM

## 2020-07-06 DIAGNOSIS — Z98.89 OTHER SPECIFIED POSTPROCEDURAL STATES: Chronic | ICD-10-CM

## 2020-07-06 DIAGNOSIS — Z86.69 PERSONAL HISTORY OF OTHER DISEASES OF THE NERVOUS SYSTEM AND SENSE ORGANS: Chronic | ICD-10-CM

## 2020-07-06 PROCEDURE — 82565 ASSAY OF CREATININE: CPT

## 2020-07-06 PROCEDURE — 70491 CT SOFT TISSUE NECK W/DYE: CPT

## 2020-07-06 PROCEDURE — 70491 CT SOFT TISSUE NECK W/DYE: CPT | Mod: 26

## 2020-07-31 ENCOUNTER — APPOINTMENT (OUTPATIENT)
Dept: VASCULAR SURGERY | Facility: CLINIC | Age: 80
End: 2020-07-31
Payer: MEDICARE

## 2020-07-31 PROCEDURE — 93880 EXTRACRANIAL BILAT STUDY: CPT

## 2020-08-06 ENCOUNTER — APPOINTMENT (OUTPATIENT)
Dept: VASCULAR SURGERY | Facility: CLINIC | Age: 80
End: 2020-08-06
Payer: MEDICARE

## 2020-08-06 PROCEDURE — 99447 NTRPROF PH1/NTRNET/EHR 11-20: CPT

## 2020-08-27 ENCOUNTER — TRANSCRIPTION ENCOUNTER (OUTPATIENT)
Age: 80
End: 2020-08-27

## 2020-10-08 ENCOUNTER — APPOINTMENT (OUTPATIENT)
Dept: SURGERY | Facility: CLINIC | Age: 80
End: 2020-10-08
Payer: MEDICARE

## 2020-10-08 PROCEDURE — 99213 OFFICE O/P EST LOW 20 MIN: CPT

## 2020-10-08 NOTE — ASSESSMENT
[FreeTextEntry1] : will observe. to return earlier if any change. to see dr Shepherd for consideration of esophageal dilation

## 2020-10-08 NOTE — PHYSICAL EXAM
[de-identified] : changes from prior surgery.   no palpable lesions.   [de-identified] : no palpable thyroid nodules [Laryngoscopy Performed] : laryngoscopy was performed, see procedure section for findings [Midline] : located in midline position [de-identified] : no evidence of recurrent disease. no new lesions noted [de-identified] : changes from prior surgery.  white changes right lower gingiva with no substance - no change since last visit.  1 mm area exposed bone left floor of mouth.  bone segment mobile [Normal] : orientation to person, place, and time: normal

## 2020-10-08 NOTE — HISTORY OF PRESENT ILLNESS
[de-identified] : 33   months  s/p partial glossectomy. denies pain, bleeding, dysphagia, hoarseness or new lesions.   no changes medically since last visit .  jaw discomfort due to loose segment of bone per dr hernández. last CT stable.  carotid atherosclerosis being followed by dr pena

## 2020-10-14 ENCOUNTER — APPOINTMENT (OUTPATIENT)
Dept: VASCULAR SURGERY | Facility: CLINIC | Age: 80
End: 2020-10-14
Payer: MEDICARE

## 2020-10-14 VITALS
BODY MASS INDEX: 30.73 KG/M2 | HEIGHT: 64 IN | DIASTOLIC BLOOD PRESSURE: 93 MMHG | TEMPERATURE: 98.1 F | WEIGHT: 180 LBS | SYSTOLIC BLOOD PRESSURE: 220 MMHG | HEART RATE: 78 BPM

## 2020-10-14 PROCEDURE — 93880 EXTRACRANIAL BILAT STUDY: CPT

## 2020-10-14 PROCEDURE — 99212 OFFICE O/P EST SF 10 MIN: CPT

## 2020-10-14 RX ORDER — SIMVASTATIN 40 MG/1
40 TABLET, FILM COATED ORAL
Qty: 30 | Refills: 6 | Status: ACTIVE | COMMUNITY
Start: 2020-10-14 | End: 1900-01-01

## 2020-12-08 ENCOUNTER — TRANSCRIPTION ENCOUNTER (OUTPATIENT)
Age: 80
End: 2020-12-08

## 2020-12-09 ENCOUNTER — APPOINTMENT (OUTPATIENT)
Dept: VASCULAR SURGERY | Facility: CLINIC | Age: 80
End: 2020-12-09
Payer: MEDICARE

## 2020-12-09 VITALS
SYSTOLIC BLOOD PRESSURE: 166 MMHG | HEART RATE: 70 BPM | BODY MASS INDEX: 30.73 KG/M2 | WEIGHT: 180 LBS | DIASTOLIC BLOOD PRESSURE: 91 MMHG | TEMPERATURE: 98 F | HEIGHT: 64 IN

## 2020-12-09 PROCEDURE — 99212 OFFICE O/P EST SF 10 MIN: CPT

## 2020-12-09 PROCEDURE — 93880 EXTRACRANIAL BILAT STUDY: CPT

## 2020-12-14 NOTE — H&P PST ADULT - GEN GEN HX ROS MEA POS PC
Detail Level: Simple Additional Notes: Patient consent was obtained to proceed with the visit and recommended plan of care after discussion of all risks and benefits, including the risks of COVID-19 exposure. weight gain of 30 pounds over the last year/weight gain

## 2021-01-20 ENCOUNTER — APPOINTMENT (OUTPATIENT)
Dept: VASCULAR SURGERY | Facility: CLINIC | Age: 81
End: 2021-01-20

## 2021-01-28 ENCOUNTER — APPOINTMENT (OUTPATIENT)
Dept: SURGERY | Facility: CLINIC | Age: 81
End: 2021-01-28
Payer: MEDICARE

## 2021-01-28 PROCEDURE — 99214 OFFICE O/P EST MOD 30 MIN: CPT

## 2021-01-28 NOTE — PHYSICAL EXAM
[de-identified] : changes from prior surgery.   no palpable lesions.   [de-identified] : no palpable thyroid nodules [Laryngoscopy Performed] : laryngoscopy was performed, see procedure section for findings [Midline] : located in midline position [de-identified] : no evidence of recurrent disease. no new lesions noted [de-identified] : changes from prior surgery.  white changes right lower gingiva with no substance - no change since last visit.  1 mm area exposed bone left floor of mouth.  bone segment mobile [Normal] : orientation to person, place, and time: normal

## 2021-01-28 NOTE — ASSESSMENT
[FreeTextEntry1] : will observe. to return earlier if any change.  no indication for any studies at this time

## 2021-01-28 NOTE — HISTORY OF PRESENT ILLNESS
[de-identified] : 35   months  s/p partial glossectomy. denies pain, bleeding, dysphagia, hoarseness or new lesions.   no changes medically since last visit .   last CT stable.  carotid atherosclerosis being followed by dr pena - considering stenting of lesion.  I have reviewed all old and new data and available images.  Additional information was obtained from others present at the time of visit to ensure the completeness of the history\par

## 2021-02-13 ENCOUNTER — APPOINTMENT (OUTPATIENT)
Dept: CT IMAGING | Facility: IMAGING CENTER | Age: 81
End: 2021-02-13
Payer: MEDICARE

## 2021-02-13 ENCOUNTER — RESULT REVIEW (OUTPATIENT)
Age: 81
End: 2021-02-13

## 2021-02-13 ENCOUNTER — OUTPATIENT (OUTPATIENT)
Dept: OUTPATIENT SERVICES | Facility: HOSPITAL | Age: 81
LOS: 1 days | End: 2021-02-13
Payer: MEDICARE

## 2021-02-13 DIAGNOSIS — Z86.69 PERSONAL HISTORY OF OTHER DISEASES OF THE NERVOUS SYSTEM AND SENSE ORGANS: Chronic | ICD-10-CM

## 2021-02-13 DIAGNOSIS — T84.7XXA INFECTION AND INFLAMMATORY REACTION DUE TO OTHER INTERNAL ORTHOPEDIC PROSTHETIC DEVICES, IMPLANTS AND GRAFTS, INITIAL ENCOUNTER: Chronic | ICD-10-CM

## 2021-02-13 DIAGNOSIS — Z00.8 ENCOUNTER FOR OTHER GENERAL EXAMINATION: ICD-10-CM

## 2021-02-13 DIAGNOSIS — Z98.890 OTHER SPECIFIED POSTPROCEDURAL STATES: Chronic | ICD-10-CM

## 2021-02-13 DIAGNOSIS — Z98.89 OTHER SPECIFIED POSTPROCEDURAL STATES: Chronic | ICD-10-CM

## 2021-02-13 PROCEDURE — 70498 CT ANGIOGRAPHY NECK: CPT

## 2021-02-13 PROCEDURE — 70496 CT ANGIOGRAPHY HEAD: CPT | Mod: 26,MH

## 2021-02-13 PROCEDURE — 82565 ASSAY OF CREATININE: CPT

## 2021-02-13 PROCEDURE — 70498 CT ANGIOGRAPHY NECK: CPT | Mod: 26,MH

## 2021-02-13 PROCEDURE — 70496 CT ANGIOGRAPHY HEAD: CPT

## 2021-02-24 ENCOUNTER — APPOINTMENT (OUTPATIENT)
Dept: VASCULAR SURGERY | Facility: CLINIC | Age: 81
End: 2021-02-24
Payer: MEDICARE

## 2021-02-24 VITALS
SYSTOLIC BLOOD PRESSURE: 181 MMHG | BODY MASS INDEX: 30.73 KG/M2 | DIASTOLIC BLOOD PRESSURE: 94 MMHG | WEIGHT: 180 LBS | HEART RATE: 80 BPM | TEMPERATURE: 97.8 F | HEIGHT: 64 IN

## 2021-02-24 PROCEDURE — 99212 OFFICE O/P EST SF 10 MIN: CPT

## 2021-06-04 NOTE — ASU PREOP CHECKLIST - SIDE RAILS UP
Pharmacy states they need clarification on  metoPROLOL tartrate (LOPRESSOR) 100 MG tablet    Pharmacy states pt was on different dosage before   Writer read message back to caller. No other info given from caller.     n/a

## 2021-06-24 ENCOUNTER — APPOINTMENT (OUTPATIENT)
Dept: SURGERY | Facility: CLINIC | Age: 81
End: 2021-06-24
Payer: MEDICARE

## 2021-06-24 PROCEDURE — 99214 OFFICE O/P EST MOD 30 MIN: CPT

## 2021-06-24 PROCEDURE — 36415 COLL VENOUS BLD VENIPUNCTURE: CPT

## 2021-06-24 NOTE — ASSESSMENT
[FreeTextEntry1] : will observe. to return earlier if any change.  no indication for any studies at this time.  bloods drawn. to call next week for results.  patient has been given the opportunity to ask questions, and all of the patient's questions have been answered to their satisfaction\par

## 2021-06-24 NOTE — HISTORY OF PRESENT ILLNESS
[de-identified] : 40   months  s/p partial glossectomy. denies pain, bleeding, dysphagia, hoarseness or new lesions.   no changes medically since last visit .   last CT stable. continues Synthroid 125 mcg daily.  carotid atherosclerosis being followed by dr pena - considering stenting of lesion.  I have reviewed all old and new data and available images.  Additional information was obtained from others present at the time of visit to ensure the completeness of the history

## 2021-06-24 NOTE — PHYSICAL EXAM
[de-identified] : changes from prior surgery.   no palpable lesions.   [de-identified] : no palpable thyroid nodules [Laryngoscopy Performed] : laryngoscopy was performed, see procedure section for findings [Midline] : located in midline position [de-identified] : no evidence of recurrent disease. no new lesions noted [de-identified] : changes from prior surgery.  white changes right lower gingiva with no substance - no change since last visit.  1 mm area exposed bone left floor of mouth.  bone segment mobile [Normal] : orientation to person, place, and time: normal

## 2021-06-25 LAB
T3 SERPL-MCNC: 86 NG/DL
T4 FREE SERPL-MCNC: 1.6 NG/DL
THYROGLOB AB SERPL-ACNC: <20 IU/ML
THYROPEROXIDASE AB SERPL IA-ACNC: 36.7 IU/ML
TSH SERPL-ACNC: 1.54 UIU/ML

## 2021-06-29 ENCOUNTER — NON-APPOINTMENT (OUTPATIENT)
Age: 81
End: 2021-06-29

## 2021-06-30 ENCOUNTER — APPOINTMENT (OUTPATIENT)
Dept: VASCULAR SURGERY | Facility: CLINIC | Age: 81
End: 2021-06-30
Payer: MEDICARE

## 2021-06-30 VITALS
HEIGHT: 64 IN | HEART RATE: 65 BPM | DIASTOLIC BLOOD PRESSURE: 79 MMHG | TEMPERATURE: 94.4 F | SYSTOLIC BLOOD PRESSURE: 158 MMHG | WEIGHT: 185 LBS | BODY MASS INDEX: 31.58 KG/M2

## 2021-06-30 VITALS — DIASTOLIC BLOOD PRESSURE: 80 MMHG | HEART RATE: 76 BPM | SYSTOLIC BLOOD PRESSURE: 175 MMHG

## 2021-06-30 PROCEDURE — 93880 EXTRACRANIAL BILAT STUDY: CPT

## 2021-06-30 PROCEDURE — 99212 OFFICE O/P EST SF 10 MIN: CPT

## 2021-07-21 RX ORDER — SIMVASTATIN 40 MG/1
40 TABLET, FILM COATED ORAL
Qty: 30 | Refills: 6 | Status: ACTIVE | COMMUNITY
Start: 2021-04-20 | End: 1900-01-01

## 2021-07-21 RX ORDER — SIMVASTATIN 20 MG/1
20 TABLET, FILM COATED ORAL DAILY
Qty: 30 | Refills: 6 | Status: ACTIVE | COMMUNITY
Start: 2018-10-16 | End: 1900-01-01

## 2021-07-27 RX ORDER — SIMVASTATIN 40 MG/1
40 TABLET, FILM COATED ORAL
Qty: 90 | Refills: 2 | Status: ACTIVE | COMMUNITY
Start: 2019-01-29 | End: 1900-01-01

## 2021-07-29 ENCOUNTER — TRANSCRIPTION ENCOUNTER (OUTPATIENT)
Age: 81
End: 2021-07-29

## 2021-10-28 ENCOUNTER — APPOINTMENT (OUTPATIENT)
Dept: SURGERY | Facility: CLINIC | Age: 81
End: 2021-10-28
Payer: MEDICARE

## 2021-10-28 PROCEDURE — 99214 OFFICE O/P EST MOD 30 MIN: CPT

## 2021-10-28 NOTE — HISTORY OF PRESENT ILLNESS
[de-identified] : 44   months  s/p partial glossectomy. denies pain, bleeding, dysphagia, hoarseness or new lesions.   no changes medically since last visit .   last CT stable. continues Synthroid 125 mcg daily.  carotid atherosclerosis being followed by dr pena - considering stenting of lesion.  I have reviewed all old and new data and available images.  Additional information was obtained from others present at the time of visit to ensure the completeness of the history

## 2021-10-28 NOTE — PHYSICAL EXAM
[de-identified] : changes from prior surgery.   no palpable lesions.   [de-identified] : no palpable thyroid nodules [Laryngoscopy Performed] : laryngoscopy was performed, see procedure section for findings [Midline] : located in midline position [de-identified] : no evidence of recurrent disease. no new lesions noted [de-identified] : changes from prior surgery.  white changes right lower gingiva with no substance - no change since last visit.  1 mm area exposed bone left floor of mouth.  bone segment mobile [Normal] : orientation to person, place, and time: normal

## 2021-10-28 NOTE — ASSESSMENT
[FreeTextEntry1] : asked pt to be evaluated by dr Oor since I am no longer treating patients with oral malignancies.  patient has been given the opportunity to ask questions, and all of the patient's questions have been answered to their satisfaction

## 2021-11-17 ENCOUNTER — APPOINTMENT (OUTPATIENT)
Dept: VASCULAR SURGERY | Facility: CLINIC | Age: 81
End: 2021-11-17
Payer: MEDICARE

## 2021-11-17 VITALS
HEIGHT: 63 IN | WEIGHT: 180 LBS | DIASTOLIC BLOOD PRESSURE: 107 MMHG | HEART RATE: 76 BPM | BODY MASS INDEX: 31.89 KG/M2 | SYSTOLIC BLOOD PRESSURE: 193 MMHG | TEMPERATURE: 97.5 F

## 2021-11-17 VITALS — DIASTOLIC BLOOD PRESSURE: 95 MMHG | SYSTOLIC BLOOD PRESSURE: 194 MMHG | HEART RATE: 75 BPM

## 2021-11-17 VITALS — SYSTOLIC BLOOD PRESSURE: 180 MMHG | DIASTOLIC BLOOD PRESSURE: 90 MMHG

## 2021-11-17 PROCEDURE — 99212 OFFICE O/P EST SF 10 MIN: CPT

## 2021-11-17 PROCEDURE — 93880 EXTRACRANIAL BILAT STUDY: CPT

## 2021-11-22 ENCOUNTER — TRANSCRIPTION ENCOUNTER (OUTPATIENT)
Age: 81
End: 2021-11-22

## 2021-12-01 ENCOUNTER — APPOINTMENT (OUTPATIENT)
Dept: VASCULAR SURGERY | Facility: CLINIC | Age: 81
End: 2021-12-01

## 2021-12-05 ENCOUNTER — APPOINTMENT (OUTPATIENT)
Dept: CT IMAGING | Facility: IMAGING CENTER | Age: 81
End: 2021-12-05
Payer: MEDICARE

## 2021-12-05 ENCOUNTER — RESULT REVIEW (OUTPATIENT)
Age: 81
End: 2021-12-05

## 2021-12-05 ENCOUNTER — OUTPATIENT (OUTPATIENT)
Dept: OUTPATIENT SERVICES | Facility: HOSPITAL | Age: 81
LOS: 1 days | End: 2021-12-05
Payer: MEDICARE

## 2021-12-05 DIAGNOSIS — Z98.89 OTHER SPECIFIED POSTPROCEDURAL STATES: Chronic | ICD-10-CM

## 2021-12-05 DIAGNOSIS — Z98.890 OTHER SPECIFIED POSTPROCEDURAL STATES: Chronic | ICD-10-CM

## 2021-12-05 DIAGNOSIS — Z86.69 PERSONAL HISTORY OF OTHER DISEASES OF THE NERVOUS SYSTEM AND SENSE ORGANS: Chronic | ICD-10-CM

## 2021-12-05 DIAGNOSIS — T84.7XXA INFECTION AND INFLAMMATORY REACTION DUE TO OTHER INTERNAL ORTHOPEDIC PROSTHETIC DEVICES, IMPLANTS AND GRAFTS, INITIAL ENCOUNTER: Chronic | ICD-10-CM

## 2021-12-05 DIAGNOSIS — R31.0 GROSS HEMATURIA: ICD-10-CM

## 2021-12-05 PROCEDURE — 70498 CT ANGIOGRAPHY NECK: CPT | Mod: MH

## 2021-12-05 PROCEDURE — 82565 ASSAY OF CREATININE: CPT

## 2021-12-05 PROCEDURE — 70498 CT ANGIOGRAPHY NECK: CPT | Mod: 26,MH

## 2021-12-16 ENCOUNTER — APPOINTMENT (OUTPATIENT)
Dept: VASCULAR SURGERY | Facility: CLINIC | Age: 81
End: 2021-12-16
Payer: MEDICARE

## 2021-12-16 PROCEDURE — 99446 NTRPROF PH1/NTRNET/EHR 5-10: CPT

## 2022-05-24 ENCOUNTER — APPOINTMENT (OUTPATIENT)
Dept: SURGERY | Facility: CLINIC | Age: 82
End: 2022-05-24
Payer: MEDICARE

## 2022-05-24 DIAGNOSIS — Z00.00 ENCOUNTER FOR GENERAL ADULT MEDICAL EXAMINATION W/OUT ABNORMAL FINDINGS: ICD-10-CM

## 2022-05-24 PROCEDURE — 99214 OFFICE O/P EST MOD 30 MIN: CPT | Mod: 25

## 2022-05-24 PROCEDURE — 36415 COLL VENOUS BLD VENIPUNCTURE: CPT

## 2022-05-24 NOTE — PHYSICAL EXAM
[de-identified] : changes from prior surgery.   no palpable lesions.   [de-identified] : no palpable thyroid nodules [Laryngoscopy Performed] : laryngoscopy was performed, see procedure section for findings [Midline] : located in midline position [de-identified] : no evidence of recurrent disease. no new lesions noted [de-identified] : changes from prior surgery.  white changes right lower gingiva with no substance - no change since last visit.  1 mm area exposed bone left floor of mouth.  bone segment mobile.  loose left incisor [Normal] : orientation to person, place, and time: normal

## 2022-05-24 NOTE — ASSESSMENT
[FreeTextEntry1] : asked to see dr reza carvajal regarding tooth.  will observe. bloods drawn. to call next week for results.  patient has been given the opportunity to ask questions, and all of the patient's questions have been answered to their satisfaction.  to return earlier if any change.

## 2022-05-24 NOTE — HISTORY OF PRESENT ILLNESS
[de-identified] : 51   months  s/p partial glossectomy. denies pain, bleeding, dysphagia, hoarseness or new lesions.   no changes medically since last visit .   last CT stable. continues Synthroid 125 mcg daily.  carotid atherosclerosis being followed by dr pena.  I have reviewed all old and new data and available images.  Additional information was obtained from others present at the time of visit to ensure the completeness of the history.   notices loose tooth

## 2022-05-25 LAB
T3 SERPL-MCNC: 73 NG/DL
T4 FREE SERPL-MCNC: 1.6 NG/DL
THYROGLOB AB SERPL-ACNC: <20 IU/ML
THYROPEROXIDASE AB SERPL IA-ACNC: 44.9 IU/ML
TSH SERPL-ACNC: 2.34 UIU/ML

## 2022-06-01 ENCOUNTER — APPOINTMENT (OUTPATIENT)
Dept: VASCULAR SURGERY | Facility: CLINIC | Age: 82
End: 2022-06-01
Payer: MEDICARE

## 2022-06-01 VITALS
TEMPERATURE: 97.3 F | SYSTOLIC BLOOD PRESSURE: 177 MMHG | WEIGHT: 180 LBS | HEIGHT: 63 IN | BODY MASS INDEX: 31.89 KG/M2 | HEART RATE: 52 BPM | DIASTOLIC BLOOD PRESSURE: 92 MMHG

## 2022-06-01 VITALS — HEART RATE: 46 BPM | SYSTOLIC BLOOD PRESSURE: 160 MMHG | DIASTOLIC BLOOD PRESSURE: 82 MMHG

## 2022-06-01 DIAGNOSIS — Z85.9 PERSONAL HISTORY OF MALIGNANT NEOPLASM, UNSPECIFIED: ICD-10-CM

## 2022-06-01 DIAGNOSIS — Z86.39 PERSONAL HISTORY OF OTHER ENDOCRINE, NUTRITIONAL AND METABOLIC DISEASE: ICD-10-CM

## 2022-06-01 DIAGNOSIS — Z86.69 PERSONAL HISTORY OF OTHER DISEASES OF THE NERVOUS SYSTEM AND SENSE ORGANS: ICD-10-CM

## 2022-06-01 PROCEDURE — 99214 OFFICE O/P EST MOD 30 MIN: CPT

## 2022-06-01 RX ORDER — LOSARTAN POTASSIUM 100 MG/1
100 TABLET, FILM COATED ORAL
Qty: 90 | Refills: 0 | Status: ACTIVE | COMMUNITY
Start: 2021-11-22

## 2022-06-01 RX ORDER — BISOPROLOL FUMARATE 10 MG/1
10 TABLET, FILM COATED ORAL
Qty: 90 | Refills: 0 | Status: ACTIVE | COMMUNITY
Start: 2022-05-17

## 2022-06-01 RX ORDER — PREGABALIN 20 MG/ML
SOLUTION ORAL
Refills: 0 | Status: ACTIVE | COMMUNITY

## 2022-06-01 NOTE — ASSESSMENT
[FreeTextEntry1] : Problem #1 Asymptomatic carotid artery stenosis\par - Left greater than right\par - Stenosis secondary to kinks not atherosclerotic disease which likely decreases risk of stroke\par - Patient also  very poor candidate for open or endovascular management of ICA stenosis\par - Continue ASA and statin\par - Follow up in 6 months for interval carotid duplex

## 2022-06-01 NOTE — HISTORY OF PRESENT ILLNESS
[FreeTextEntry1] : 80 yo F with history of asymptomatic carotid artery stenosis undergoing surveillance.\par Previous patient of Dr. Polanco.\par Hx of multiple left neck surgeries for head and neck cancer as well as radiation.\par Never had a stroke, TIA, or amaurosis fugax before.\par Denies any new complaints.

## 2022-06-01 NOTE — DATA REVIEWED
[FreeTextEntry1] : CTA reviewed\par Bilateral ICA kinks, left greater than right\par Left ICA stenosis 75%, minimal atherosclerotic disease

## 2022-06-01 NOTE — PHYSICAL EXAM
[Respiratory Effort] : normal respiratory effort [Normal Rate and Rhythm] : normal rate and rhythm [2+] : left 2+ [Abdomen Tenderness] : ~T ~M No abdominal tenderness [Skin Ulcer] : no ulcer [Alert] : alert [Oriented to Person] : oriented to person [Oriented to Place] : oriented to place [Oriented to Time] : oriented to time [Calm] : calm [de-identified] : appears stated age [de-identified] : normocephalic, atraumatic [de-identified] : supple

## 2022-06-01 NOTE — END OF VISIT
[Time Spent: ___ minutes] : I have spent [unfilled] minutes of time on the encounter. Detail Level: Detailed

## 2022-06-01 NOTE — REASON FOR VISIT
[Initial Eval - Existing Diagnosis] : an initial evaluation of an existing diagnosis [FreeTextEntry1] : carotid artery stenosis

## 2022-06-06 ENCOUNTER — NON-APPOINTMENT (OUTPATIENT)
Age: 82
End: 2022-06-06

## 2022-07-27 ENCOUNTER — APPOINTMENT (OUTPATIENT)
Dept: ORTHOPEDIC SURGERY | Facility: CLINIC | Age: 82
End: 2022-07-27

## 2022-07-27 VITALS
BODY MASS INDEX: 31.89 KG/M2 | DIASTOLIC BLOOD PRESSURE: 64 MMHG | SYSTOLIC BLOOD PRESSURE: 152 MMHG | HEART RATE: 72 BPM | HEIGHT: 63 IN | WEIGHT: 180 LBS

## 2022-07-27 DIAGNOSIS — M19.012 PRIMARY OSTEOARTHRITIS, RIGHT SHOULDER: ICD-10-CM

## 2022-07-27 DIAGNOSIS — M19.011 PRIMARY OSTEOARTHRITIS, RIGHT SHOULDER: ICD-10-CM

## 2022-07-27 PROCEDURE — 99203 OFFICE O/P NEW LOW 30 MIN: CPT

## 2022-07-27 PROCEDURE — 73030 X-RAY EXAM OF SHOULDER: CPT | Mod: 50

## 2022-07-27 RX ORDER — DICLOFENAC SODIUM 1% 10 MG/G
1 GEL TOPICAL
Qty: 1 | Refills: 2 | Status: ACTIVE | COMMUNITY
Start: 2022-07-27 | End: 1900-01-01

## 2022-09-06 ENCOUNTER — APPOINTMENT (OUTPATIENT)
Dept: NEUROLOGY | Facility: CLINIC | Age: 82
End: 2022-09-06

## 2022-09-06 VITALS
WEIGHT: 192 LBS | DIASTOLIC BLOOD PRESSURE: 82 MMHG | HEIGHT: 63 IN | HEART RATE: 64 BPM | BODY MASS INDEX: 34.02 KG/M2 | SYSTOLIC BLOOD PRESSURE: 176 MMHG

## 2022-09-06 PROCEDURE — 99204 OFFICE O/P NEW MOD 45 MIN: CPT

## 2022-09-06 NOTE — CONSULT LETTER
[Dear  ___] : Dear  [unfilled], [Consult Letter:] : I had the pleasure of evaluating your patient, [unfilled]. [Please see my note below.] : Please see my note below. [Consult Closing:] : Thank you very much for allowing me to participate in the care of this patient.  If you have any questions, please do not hesitate to contact me. [Sincerely,] : Sincerely, [FreeTextEntry3] : Govind Estevez MD\par  [DrBillie  ___] : Dr. COLIN

## 2022-09-06 NOTE — ASSESSMENT
[FreeTextEntry1] : Mrs. Larson is an 81-year-old with a long history of oral cancer status post left hemimandibulectomy, chemotherapy and radiation therapy.  She presents with subacute progressive gait difficulties.  Her gait is waddling.  She however does not clinically appear particularly myopathic or myelopathic.  She does not appear terribly parkinsonian.  I suspect that her presentation is most likely due to her previous treatments particularly radiation.  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  Further management will depend upon those results and clinical course.

## 2022-09-06 NOTE — HISTORY OF PRESENT ILLNESS
[FreeTextEntry1] : Mrs. Shanique Larson is an 81-year-old right-handed patient who was referred for neurologic evaluation at the suggestion of Dr. Andrews Ewing.  She was accompanied by Mr. Larson.\par \par Mrs. Larson was diagnosed with carcinoma of the left mandible in 2008.  She underwent a radical neck dissection and hemimandibulectomy.  She was treated with chemotherapy and radiation.  She had multiple complications involving the procedure.\par \par She has been experiencing difficulties with her gait for a few years.  She waddles.  She has pains in her shoulders and her legs are heavy and fatigued.  She has numbness and tingling in her hands, recent pain in her right fifth finger, dysphagia and cervical kyphosis.\par \par She has been under the care of Dr. Evin Swartz.  \par \par CT angiography performed in 2018 revealed 70 to 80% Stenosis of the right internal carotid artery.  There was slight irregularity and beading of the mid cervical right internal carotid artery possibly due to fibromuscular dysplasia or postradiation fibrosis.  There was moderate to marked focal kink stenosis of the left internal carotid artery, 1.5 cm distal to its origin resulting in 80% narrowing and a beaded appearance.  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  There was a 1 cm enhancing soft tissue fullness filling and mildly enlarging the right cavernous sinus.  This was felt to represent a neuroma or meningioma.\par \par MRI of the brain performed in December 2020 revealed stable small enhancing right cavernous sinus mass with a small component extending contiguous with the carotid siphon to bulge into the right superolateral sella.  This was consistent with a stable meningioma.  There were no enhancing parenchymal lesions in the brain.  There was an incidental 7 mm homogeneously enhancing meningioma in the right inferolateral frontal region.  There was severe high-grade focal stenosis of the right internal carotid artery approximately 2 cm distal to its origin.  There was near interruption of flow signal consistent with greater than 90% narrowing.  There was moderate tortuosity in the proximal left internal carotid artery with focal working stenosis of 80% approximately 1 cm distal to the origin.\par \par Past surgical history is notable for the multiple procedures involving her left mandible and tongue.  She is status post bilateral cataract extractions.  She suffers with hypertension, hyperlipidemia, hypothyroidism and oral cancer.  There is no history of diabetes, cardiac, pulmonary, renal, hepatic, gastrointestinal or hematologic disease.  She has no drug allergies.  Medications include gabapentin 200 mg at bedtime, losartan, simvastatin, levothyroxine and aspirin.\par \par She is a former smoker and social drinker.  She is .  Family history notable for heart disease in her father and a stroke in her mother.\par \par

## 2022-09-06 NOTE — PHYSICAL EXAM
[FreeTextEntry1] : Constitutional:  Patient was elderly but well-nourished and in no acute distress. \par \par Head:  Normocephalic, atraumatic. Tympanic membranes were clear. \par \par Neck: She had severe cervical kyphosis with limited lateral movements.\par \par Cardiovascular:  Cardiac rhythm was regular without murmur. There were no carotid bruits. Peripheral pulses were full and symmetric. \par \par Respiratory:  Lungs were clear. \par \par Abdomen:  Soft and nontender. \par \par Spine:  Nontender. \par \par Skin:  There were no rashes. \par \par NEUROLOGICAL EXAMINATION:\par \par Mental Status: Patient was alert and oriented. Speech was fluent. There was mild lingual dysarthria.\par \par Cranial Nerves: \par \par II: She could finger count bilaterally.  Pupils were surgical but reactive. Visual fields were full. Funduscopic examination was normal. \par \par III, IV, VI:  Eye movements were full without nystagmus. \par \par V: Facial sensation was intact except for decreased light touch along the left jaw and chin. \par \par VII: Facial strength was normal. \par \par VIII: Hearing was diminished bilaterally. \par \par IX, X: Palatal movement was normal. Phonation was mildly slow. \par \par XI: Sternocleidomastoids and trapezii were normal. \par \par XII: There was a left lingual atrophy and a few fasciculations.  Tongue movements were slow.\par \par Motor Examination: Muscle bulk, tone and strength were normal.  There was no tremor.\par \par Sensory Examination: Pinprick, vibration and joint position sense were intact. \par \par Reflexes: DTRs were 2 throughout except for the left triceps and both brachioradialis reflexes were absent and the ankle jerks were 1.\par \par Plantar Responses: Plantar responses were flexor. \par \par Coordination/Cerebellar Function: There was no dysmetria on finger to nose or heel to shin testing. \par \par Gait/Stance: Gait was waddling.  Romberg was positive.

## 2022-09-09 ENCOUNTER — LABORATORY RESULT (OUTPATIENT)
Age: 82
End: 2022-09-09

## 2022-09-10 ENCOUNTER — NON-APPOINTMENT (OUTPATIENT)
Age: 82
End: 2022-09-10

## 2022-09-10 LAB
ALBUMIN SERPL ELPH-MCNC: 4.4 G/DL
ALP BLD-CCNC: 68 U/L
ALT SERPL-CCNC: 18 U/L
ANION GAP SERPL CALC-SCNC: 13 MMOL/L
AST SERPL-CCNC: 20 U/L
BASOPHILS # BLD AUTO: 0.03 K/UL
BASOPHILS NFR BLD AUTO: 0.5 %
BILIRUB SERPL-MCNC: 0.3 MG/DL
BUN SERPL-MCNC: 22 MG/DL
CALCIUM SERPL-MCNC: 9.8 MG/DL
CHLORIDE SERPL-SCNC: 105 MMOL/L
CK SERPL-CCNC: 215 U/L
CO2 SERPL-SCNC: 25 MMOL/L
CREAT SERPL-MCNC: 1.03 MG/DL
CRP SERPL-MCNC: <3 MG/L
EGFR: 54 ML/MIN/1.73M2
EOSINOPHIL # BLD AUTO: 0.23 K/UL
EOSINOPHIL NFR BLD AUTO: 3.6 %
ERYTHROCYTE [SEDIMENTATION RATE] IN BLOOD BY WESTERGREN METHOD: 21 MM/HR
ESTIMATED AVERAGE GLUCOSE: 114 MG/DL
FOLATE SERPL-MCNC: >20 NG/ML
GLUCOSE SERPL-MCNC: 88 MG/DL
HAV IGM SER QL: NONREACTIVE
HBA1C MFR BLD HPLC: 5.6 %
HBV CORE IGM SER QL: NONREACTIVE
HBV SURFACE AG SER QL: NONREACTIVE
HCT VFR BLD CALC: 39.2 %
HCV AB SER QL: NONREACTIVE
HCV S/CO RATIO: 0.1 S/CO
HCYS SERPL-MCNC: 9.4 UMOL/L
HGB BLD-MCNC: 12.1 G/DL
IMM GRANULOCYTES NFR BLD AUTO: 0.3 %
LYMPHOCYTES # BLD AUTO: 1.23 K/UL
LYMPHOCYTES NFR BLD AUTO: 19.3 %
MAN DIFF?: NORMAL
MCHC RBC-ENTMCNC: 27 PG
MCHC RBC-ENTMCNC: 30.9 GM/DL
MCV RBC AUTO: 87.5 FL
MONOCYTES # BLD AUTO: 0.6 K/UL
MONOCYTES NFR BLD AUTO: 9.4 %
NEUTROPHILS # BLD AUTO: 4.26 K/UL
NEUTROPHILS NFR BLD AUTO: 66.9 %
PLATELET # BLD AUTO: 195 K/UL
POTASSIUM SERPL-SCNC: 4.7 MMOL/L
PROT SERPL-MCNC: 7 G/DL
RBC # BLD: 4.48 M/UL
RBC # FLD: 15.6 %
RHEUMATOID FACT SER QL: 10 IU/ML
SODIUM SERPL-SCNC: 143 MMOL/L
T PALLIDUM AB SER QL IA: NEGATIVE
TSH SERPL-ACNC: 1.67 UIU/ML
VIT B12 SERPL-MCNC: 1689 PG/ML
WBC # FLD AUTO: 6.37 K/UL

## 2022-09-12 LAB
ANA PAT FLD IF-IMP: ABNORMAL
ANACR T: ABNORMAL
CCP AB SER IA-ACNC: <8 UNITS
RF+CCP IGG SER-IMP: NEGATIVE

## 2022-09-13 LAB
ALBUMIN MFR SERPL ELPH: 57 %
ALBUMIN SERPL-MCNC: 3.9 G/DL
ALBUMIN/GLOB SERPL: 1.3 RATIO
ALPHA1 GLOB MFR SERPL ELPH: 4.8 %
ALPHA1 GLOB SERPL ELPH-MCNC: 0.3 G/DL
ALPHA2 GLOB MFR SERPL ELPH: 11.4 %
ALPHA2 GLOB SERPL ELPH-MCNC: 0.8 G/DL
B-GLOBULIN MFR SERPL ELPH: 10.9 %
B-GLOBULIN SERPL ELPH-MCNC: 0.8 G/DL
DEPRECATED KAPPA LC FREE/LAMBDA SER: 1.28 RATIO
GAMMA GLOB FLD ELPH-MCNC: 1.1 G/DL
GAMMA GLOB MFR SERPL ELPH: 15.9 %
IGA SER QL IEP: 166 MG/DL
IGG SER QL IEP: 1098 MG/DL
IGM SER QL IEP: 107 MG/DL
INTERPRETATION SERPL IEP-IMP: NORMAL
KAPPA LC CSF-MCNC: 2.03 MG/DL
KAPPA LC SERPL-MCNC: 2.59 MG/DL
M PROTEIN SPEC IFE-MCNC: NORMAL
METHYLMALONATE SERPL-SCNC: 150 NMOL/L
PROT SERPL-MCNC: 6.9 G/DL
PROT SERPL-MCNC: 6.9 G/DL

## 2022-09-14 LAB
COPPER SERPL-MCNC: 125 UG/DL
IGA 24H UR QL IFE: NORMAL
VIT B1 SERPL-MCNC: 177.2 NMOL/L
ZINC SERPL-MCNC: 68 UG/DL

## 2022-09-15 LAB
ASIALO-GM1 ANTIBODIES, IGG/IGM: 28 IV
GD1A ANTIBODIES, IGG/IGM: 9 IV
GD1B ANTIBODIES, IGG/IGM: 11 IV
GM1 ANTIBODIES, IGG/IGM: 11 IV
GM2 ANTIBODIES, IGG/IGM: 12 IV
GQ1B ANTIBODIES, IGG/IGM: 5 IV

## 2022-09-16 LAB
FMR1 GENE MUT ANL BLD/T: NORMAL
INNER EAR 68KD AB FLD QL: POSITIVE
VIT B6 SERPL-MCNC: 48.1 UG/L

## 2022-09-16 RX ORDER — DIAZEPAM 5 MG/1
5 TABLET ORAL
Qty: 2 | Refills: 0 | Status: ACTIVE | COMMUNITY
Start: 2022-09-16 | End: 1900-01-01

## 2022-09-20 LAB
AMPA-R ABCBA: NEGATIVE
AMPHIPHYSIN IGG TITR SER IF: NEGATIVE TITER
CASPR2-IGG CBA, S: NEGATIVE
CV2 IGG TITR SER: NEGATIVE TITER
GABA-B ABCBA: NEGATIVE
GAD65 AB SER-MCNC: 0 NMOL/L
GLIAL NUC TYPE 1 AB TITR SER: NEGATIVE TITER
HTLV I+II AB SER QL: NORMAL
HU1 AB TITR SER: NEGATIVE TITER
HU2 AB TITR SER IF: NEGATIVE TITER
HU3 AB TITR SER: NEGATIVE TITER
IGLON5 IFA, S: NEGATIVE
IMMUNOLOGIST REVIEW: NORMAL
LGI1-IGG CBA, S: NEGATIVE
MAG AB SER QL: NEGATIVE
NIF IFA, S: NEGATIVE
NMDA-R ABCBA: NEGATIVE
PCA-1 AB TITR SER: NEGATIVE TITER
PCA-2 AB TITR SER: NEGATIVE TITER
PCA-TR AB TITR SER: NEGATIVE TITER
REFLEX ADDED: NORMAL

## 2022-09-22 ENCOUNTER — APPOINTMENT (OUTPATIENT)
Dept: MRI IMAGING | Facility: CLINIC | Age: 82
End: 2022-09-22

## 2022-09-22 ENCOUNTER — OUTPATIENT (OUTPATIENT)
Dept: OUTPATIENT SERVICES | Facility: HOSPITAL | Age: 82
LOS: 1 days | End: 2022-09-22
Payer: MEDICARE

## 2022-09-22 DIAGNOSIS — Z86.69 PERSONAL HISTORY OF OTHER DISEASES OF THE NERVOUS SYSTEM AND SENSE ORGANS: Chronic | ICD-10-CM

## 2022-09-22 DIAGNOSIS — T84.7XXA INFECTION AND INFLAMMATORY REACTION DUE TO OTHER INTERNAL ORTHOPEDIC PROSTHETIC DEVICES, IMPLANTS AND GRAFTS, INITIAL ENCOUNTER: Chronic | ICD-10-CM

## 2022-09-22 DIAGNOSIS — C02.9 MALIGNANT NEOPLASM OF TONGUE, UNSPECIFIED: ICD-10-CM

## 2022-09-22 DIAGNOSIS — Z98.890 OTHER SPECIFIED POSTPROCEDURAL STATES: Chronic | ICD-10-CM

## 2022-09-22 DIAGNOSIS — Z98.89 OTHER SPECIFIED POSTPROCEDURAL STATES: Chronic | ICD-10-CM

## 2022-09-22 DIAGNOSIS — R27.0 ATAXIA, UNSPECIFIED: ICD-10-CM

## 2022-09-22 PROCEDURE — A9585: CPT

## 2022-09-22 PROCEDURE — 70553 MRI BRAIN STEM W/O & W/DYE: CPT | Mod: 26,MH

## 2022-09-22 PROCEDURE — 72156 MRI NECK SPINE W/O & W/DYE: CPT | Mod: 26,MH

## 2022-09-22 PROCEDURE — 72156 MRI NECK SPINE W/O & W/DYE: CPT

## 2022-09-22 PROCEDURE — 70553 MRI BRAIN STEM W/O & W/DYE: CPT

## 2022-09-28 ENCOUNTER — NON-APPOINTMENT (OUTPATIENT)
Age: 82
End: 2022-09-28

## 2022-10-11 LAB — HEREDITARY NEUROPATHY PANEL: NORMAL

## 2022-11-29 ENCOUNTER — APPOINTMENT (OUTPATIENT)
Dept: SURGERY | Facility: CLINIC | Age: 82
End: 2022-11-29

## 2022-12-05 ENCOUNTER — NON-APPOINTMENT (OUTPATIENT)
Age: 82
End: 2022-12-05

## 2022-12-08 ENCOUNTER — APPOINTMENT (OUTPATIENT)
Dept: NEUROLOGY | Facility: CLINIC | Age: 82
End: 2022-12-08

## 2022-12-08 VITALS
BODY MASS INDEX: 34.02 KG/M2 | WEIGHT: 192 LBS | HEIGHT: 63 IN | DIASTOLIC BLOOD PRESSURE: 85 MMHG | SYSTOLIC BLOOD PRESSURE: 175 MMHG | HEART RATE: 90 BPM

## 2022-12-08 DIAGNOSIS — M25.511 PAIN IN RIGHT SHOULDER: ICD-10-CM

## 2022-12-08 DIAGNOSIS — M25.512 PAIN IN RIGHT SHOULDER: ICD-10-CM

## 2022-12-08 PROCEDURE — 99214 OFFICE O/P EST MOD 30 MIN: CPT

## 2022-12-10 NOTE — ASSESSMENT
[FreeTextEntry1] : Mrs. Larson is an 82-year-old with a long history of oral cancer status post left hemimandibulectomy, chemotherapy and radiation therapy.  She has longstanding mobility difficulties.  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 Chao.  She is scheduled for EMG and nerve conduction studies which I encouraged her to keep to exclude a neuromuscular process.  In view of her recent worsening, I suggested updated blood test including inflammatory markers and CPK.  In view of her polyarthritis, I suggested rheumatology consultation.  I suggested pursuing physical therapy.  Further management will depend upon these results and her clinical course.

## 2022-12-10 NOTE — CONSULT LETTER
[Dear  ___] : Dear  [unfilled], [Consult Letter:] : I had the pleasure of evaluating your patient, [unfilled]. [Please see my note below.] : Please see my note below. [Consult Closing:] : Thank you very much for allowing me to participate in the care of this patient.  If you have any questions, please do not hesitate to contact me. [Sincerely,] : Sincerely, [DrBillie  ___] : Dr. COLIN [FreeTextEntry3] : Govind Estevez MD\par

## 2022-12-10 NOTE — HISTORY OF PRESENT ILLNESS
[FreeTextEntry1] : Mrs. Shanique Larson returned to the office having been initially evaluated on September 6, 2022.  She is an 82-year-old right-handed patient who was diagnosed with carcinoma of the left mandible in 2008.  She underwent a radical neck dissection and hemimandibulectomy.  She was treated with chemotherapy and radiation.  She had multiple complications involving the procedure.\par \par At presentation, she complained of difficulties with her gait for a few years.  She waddled.  She had pains in her shoulders and her legs were heavy and fatigued.  She had numbness and tingling in her hands, recent pain in her right fifth finger, dysphagia and cervical kyphosis.\par \par She was previously under the care of Dr. Evin Swartz.  \par \par CT angiography performed in 2018 revealed 70 to 80% Stenosis of the right internal carotid artery.  There was slight irregularity and beading of the mid cervical right internal carotid artery possibly due to fibromuscular dysplasia or postradiation fibrosis.  There was moderate to marked focal kink stenosis of the left internal carotid artery, 1.5 cm distal to its origin resulting in 80% narrowing and a beaded appearance.  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  There was a 1 cm enhancing soft tissue fullness filling and mildly enlarging the right cavernous sinus.  This was felt to represent a neuroma or meningioma.\par \par MRI of the brain performed in December 2020 revealed stable small enhancing right cavernous sinus mass with a small component extending contiguous with the carotid siphon to bulge into the right superolateral sella.  This was consistent with a stable meningioma.  There were no enhancing parenchymal lesions in the brain.  There was an incidental 7 mm homogeneously enhancing meningioma in the right inferolateral frontal region.  There was severe high-grade focal stenosis of the right internal carotid artery approximately 2 cm distal to its origin.  There was near interruption of flow signal consistent with greater than 90% narrowing.  There was moderate tortuosity in the proximal left internal carotid artery with focal working stenosis of 80% approximately 1 cm distal to the origin.\par \par In summary, she presented with subacute progressive gait difficulties with a waddling gait.  She did not appear myelopathic, myopathic or parkinsonian.  I however suggested exclusion of cervical myelopathy.  MRI of the brain revealed a small frontal meningioma.  MRI of the cervical spine revealed multilevel spondylosis with no evidence of abnormal cord signal or enhancement.\par \par She returned to the office because of worsening mobility for about a month.  She has difficulty rising from a seated position.  She complains of severe pains in her shoulders and arms.  Her legs are heavy.  Her knees feel like buckling.  She is unsteady.  She denies sphincteric difficulties.\par \par Medications include pregabalin 200 mg at bedtime, losartan, simvastatin, levothyroxine and aspirin.\par \par Past surgical history is notable for the multiple procedures involving her left mandible and tongue.  She is status post bilateral cataract extractions.  She suffers with hypertension, hyperlipidemia, hypothyroidism and oral cancer.  There is no history of diabetes, cardiac, pulmonary, renal, hepatic, gastrointestinal or hematologic disease.  She has no drug allergies. \par \par She is a former smoker and social drinker.  She is .  Family history notable for heart disease in her father and a stroke in her mother.\par \par

## 2022-12-10 NOTE — PHYSICAL EXAM
[FreeTextEntry1] : Constitutional:  Patient was elderly but well-nourished and in no acute distress. \par \par Head:  Normocephalic, atraumatic. Tympanic membranes were clear. \par \par Neck: She had severe cervical kyphosis with limited lateral movements.\par \par Cardiovascular:  Cardiac rhythm was regular without murmur. There were no carotid bruits. Peripheral pulses were full and symmetric. \par \par Respiratory:  Lungs were clear. \par \par Abdomen:  Soft and nontender. \par \par Spine:  Nontender. \par \par Skin:  There were no rashes. \par \par NEUROLOGICAL EXAMINATION:\par \par Mental Status: Patient was alert and oriented. Speech was fluent. There was mild lingual dysarthria.\par \par Cranial Nerves: \par \par II: She could finger count bilaterally.  Pupils were surgical but reactive. Visual fields were full. Funduscopic examination was normal. \par \par III, IV, VI:  Eye movements were full without nystagmus. \par \par V: Facial sensation was intact except for decreased light touch along the left jaw and chin. \par \par VII: Facial strength was normal. \par \par VIII: Hearing was diminished bilaterally. \par \par IX, X: Palatal movement was normal. Phonation was mildly slow. \par \par XI: Sternocleidomastoids and trapezii were normal. \par \par XII: There was a left lingual atrophy and a few fasciculations.  Tongue movements were slow.\par \par Motor Examination: Muscle bulk, tone and strength were normal except for marked limitation of shoulder movements.  There was no tremor.\par \par Sensory Examination: Pinprick, vibration and joint position sense were intact. \par \par Reflexes: DTRs were 1 at the right biceps, 2 at the knees and absent elsewhere.\par \par Plantar Responses: Plantar responses were flexor. \par \par Coordination/Cerebellar Function: There was no dysmetria on finger to nose testing. \par \par Gait/Stance: Gait was waddling.  Romberg was positive.

## 2022-12-23 ENCOUNTER — NON-APPOINTMENT (OUTPATIENT)
Age: 82
End: 2022-12-23

## 2022-12-23 LAB — SENSORIMOTOR NEUROPATHY PROFILE W/ RECOMBX: NORMAL

## 2023-01-11 ENCOUNTER — APPOINTMENT (OUTPATIENT)
Dept: OTOLARYNGOLOGY | Facility: CLINIC | Age: 83
End: 2023-01-11

## 2023-01-25 ENCOUNTER — APPOINTMENT (OUTPATIENT)
Dept: VASCULAR SURGERY | Facility: CLINIC | Age: 83
End: 2023-01-25
Payer: MEDICARE

## 2023-01-25 PROCEDURE — 93880 EXTRACRANIAL BILAT STUDY: CPT

## 2023-01-25 PROCEDURE — 99213 OFFICE O/P EST LOW 20 MIN: CPT

## 2023-01-25 RX ORDER — CLOPIDOGREL BISULFATE 75 MG/1
75 TABLET, FILM COATED ORAL DAILY
Qty: 90 | Refills: 3 | Status: ACTIVE | COMMUNITY
Start: 2023-01-25 | End: 1900-01-01

## 2023-01-25 NOTE — HISTORY OF PRESENT ILLNESS
[FreeTextEntry1] : 82 yo F with history of asymptomatic carotid artery stenosis undergoing surveillance.\par Previous patient of Dr. Polanco.\par Hx of multiple left neck surgeries for head and neck cancer as well as radiation.\par Never had a stroke, TIA, or amaurosis fugax before.\par Denies any new complaints. [de-identified] : Worsening neuromuscular/musculoskeletal function. Otherwise no new complaints. No CVA or TIA. Sees a neurologist regularly.

## 2023-01-25 NOTE — PHYSICAL EXAM
[Respiratory Effort] : normal respiratory effort [Normal Rate and Rhythm] : normal rate and rhythm [2+] : left 2+ [Abdomen Tenderness] : ~T ~M No abdominal tenderness [Skin Ulcer] : no ulcer [Alert] : alert [Oriented to Person] : oriented to person [Oriented to Place] : oriented to place [Oriented to Time] : oriented to time [Calm] : calm [de-identified] : appears stated age [de-identified] : normocephalic, atraumatic [de-identified] : supple

## 2023-01-25 NOTE — ASSESSMENT
[FreeTextEntry1] : Problem #1 bilateral asymptomatic carotid artery stenosis\par - will add plavix 75 mg PO daily to help reduce risk of stroke\par - poor surgical candidate - no reasonable open or endovascular options especially with recent decline in neuromuscular function\par - follow up as needed

## 2023-03-14 ENCOUNTER — APPOINTMENT (OUTPATIENT)
Dept: SURGERY | Facility: CLINIC | Age: 83
End: 2023-03-14
Payer: MEDICARE

## 2023-03-14 PROCEDURE — 99214 OFFICE O/P EST MOD 30 MIN: CPT

## 2023-03-14 NOTE — HISTORY OF PRESENT ILLNESS
[de-identified] : 60   months  s/p partial glossectomy. denies pain, bleeding, dysphagia, hoarseness or new lesions.   no changes medically since last visit .   last CT stable. continues Synthroid 125 mcg daily.  carotid atherosclerosis being followed by dr gonzalez. seen by dr hernández recently for obvious bone infection, treated with Keflex..  I have reviewed all old and new data and available images.  Additional information was obtained from others present at the time of visit to ensure the completeness of the history.

## 2023-03-14 NOTE — ASSESSMENT
[FreeTextEntry1] :  will observe. is not a surgical candidate due to comorbidities patient has been given the opportunity to ask questions, and all of the patient's questions have been answered to their satisfaction.  to return earlier if any change.

## 2023-03-14 NOTE — PHYSICAL EXAM
[de-identified] : changes from prior surgery.   no palpable lesions.   [de-identified] : no palpable thyroid nodules [Laryngoscopy Performed] : laryngoscopy was performed, see procedure section for findings [Midline] : located in midline position [de-identified] : no evidence of recurrent disease. no new lesions noted [de-identified] : changes from prior surgery.  white changes right lower gingiva with no substance - no change since last visit.  1 mm area exposed bone left floor of mouth.  bone segment mobile.  loose left incisor fragment removed [Normal] : orientation to person, place, and time: normal

## 2023-05-04 ENCOUNTER — APPOINTMENT (OUTPATIENT)
Dept: NEUROLOGY | Facility: CLINIC | Age: 83
End: 2023-05-04

## 2023-05-10 ENCOUNTER — TRANSCRIPTION ENCOUNTER (OUTPATIENT)
Age: 83
End: 2023-05-10

## 2023-05-10 ENCOUNTER — APPOINTMENT (OUTPATIENT)
Dept: NEUROLOGY | Facility: CLINIC | Age: 83
End: 2023-05-10
Payer: MEDICARE

## 2023-05-10 ENCOUNTER — APPOINTMENT (OUTPATIENT)
Dept: NEUROLOGY | Facility: CLINIC | Age: 83
End: 2023-05-10

## 2023-05-10 PROCEDURE — 95911 NRV CNDJ TEST 9-10 STUDIES: CPT

## 2023-05-10 PROCEDURE — 95885 MUSC TST DONE W/NERV TST LIM: CPT

## 2023-05-10 NOTE — PROCEDURE
[FreeTextEntry1] : Nerve conduction studies and electromyography [FreeTextEntry2] : Rule out neuromuscular disorder [FreeTextEntry3] : Electrodiagnostic testing was performed in the right upper and lower extremities.\par \par The radial and median sensory potentials were mildly low in amplitude.  There was slowing of median conduction across the wrist segment.  Radial conduction velocity was normal.  The ulnar sensory nerve was nonreactive.\par \par The median motor distal latency and compound muscle action potential were normal.  There was mild slowing of median conduction.  The ulnar motor distal latency was mildly prolonged and the compound muscle action potential was low in amplitude.  There was severe slowing of ulnar conduction across the elbow segment.\par \par The median F wave was normal.  The ulnar F wave was prolonged.\par \par The sural sensory nerve was nonreactive.\par \par The tibial motor distal latency was normal but the compound muscle action potential was low in amplitude.  Tibial conduction velocity was mildly slow.  The peroneal motor nerve was nonreactive recording the extensor digitorum brevis.  At the tibialis anterior, the distal motor latency and conduction velocity were normal.  The compound muscle action potential was low in amplitude.\par \par The tibial F wave was prolonged.  The tibial H reflex was prolonged.\par \par Needle electromyography was performed in several right lower extremity muscles.  There was spontaneous activity in the medial gastrocnemius only.  All other muscles were silent at rest.  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  A superimposed right lumbar radiculopathy could not be excluded.\par \par There was a moderately severe ulnar neuropathy in the right cubital tunnel.  There was a mild median neuropathy at the wrist.  There was no evidence of myopathy.\par \par Clinical correlation: A comprehensive serologic evaluation including a hereditary neuropathy panel was unremarkable.  MRIs of the brain and cervical spine did not reveal an obvious cause for ataxia.  I suspect that her ataxia is at least partly due to a severe sensorimotor polyneuropathy and possibly vestibular insufficiency.  HSP 70 was positive.  I question whether her neuropathy might be the consequence of prior treatment with neurotoxic chemotherapeutic agents.  She will retrieve records for review.

## 2023-05-10 NOTE — CONSULT LETTER
[Dear  ___] : Dear  [unfilled], [Consult Letter:] : I had the pleasure of evaluating your patient, [unfilled]. [Please see my note below.] : Please see my note below. [Consult Closing:] : Thank you very much for allowing me to participate in the care of this patient.  If you have any questions, please do not hesitate to contact me. [Sincerely,] : Sincerely, [FreeTextEntry3] : Govind Estevez MD\par  [DrBillie  ___] : Dr. COLIN [DrBillie ___] : Dr. COLIN

## 2023-05-17 ENCOUNTER — NON-APPOINTMENT (OUTPATIENT)
Age: 83
End: 2023-05-17

## 2023-09-14 ENCOUNTER — APPOINTMENT (OUTPATIENT)
Dept: NEUROLOGY | Facility: CLINIC | Age: 83
End: 2023-09-14
Payer: MEDICARE

## 2023-09-14 ENCOUNTER — LABORATORY RESULT (OUTPATIENT)
Age: 83
End: 2023-09-14

## 2023-09-14 VITALS — HEART RATE: 96 BPM | SYSTOLIC BLOOD PRESSURE: 155 MMHG | DIASTOLIC BLOOD PRESSURE: 83 MMHG

## 2023-09-14 DIAGNOSIS — R70.0 ELEVATED ERYTHROCYTE SEDIMENTATION RATE: ICD-10-CM

## 2023-09-14 DIAGNOSIS — R27.0 ATAXIA, UNSPECIFIED: ICD-10-CM

## 2023-09-14 DIAGNOSIS — G60.8 OTHER HEREDITARY AND IDIOPATHIC NEUROPATHIES: ICD-10-CM

## 2023-09-14 DIAGNOSIS — I65.23 OCCLUSION AND STENOSIS OF BILATERAL CAROTID ARTERIES: ICD-10-CM

## 2023-09-14 LAB
CK SERPL-CCNC: 93 U/L
CRP SERPL-MCNC: 74 MG/L
ERYTHROCYTE [SEDIMENTATION RATE] IN BLOOD BY WESTERGREN METHOD: 58 MM/HR

## 2023-09-14 PROCEDURE — 99214 OFFICE O/P EST MOD 30 MIN: CPT

## 2023-09-14 RX ORDER — PREDNISONE 10 MG/1
10 TABLET ORAL
Qty: 180 | Refills: 0 | Status: ACTIVE | COMMUNITY
Start: 2023-09-14 | End: 1900-01-01

## 2023-09-15 LAB — ALDOLASE SERPL-CCNC: 5.2 U/L

## 2023-09-19 ENCOUNTER — LABORATORY RESULT (OUTPATIENT)
Age: 83
End: 2023-09-19

## 2023-09-19 ENCOUNTER — APPOINTMENT (OUTPATIENT)
Dept: SURGERY | Facility: CLINIC | Age: 83
End: 2023-09-19
Payer: MEDICARE

## 2023-09-19 LAB
T3 SERPL-MCNC: 52 NG/DL
T4 FREE SERPL-MCNC: 1.9 NG/DL
TSH SERPL-ACNC: 0.53 UIU/ML

## 2023-09-19 PROCEDURE — 99214 OFFICE O/P EST MOD 30 MIN: CPT | Mod: 25

## 2023-09-19 PROCEDURE — 36415 COLL VENOUS BLD VENIPUNCTURE: CPT

## 2023-09-23 LAB — HMGCR ANTIBODY, IGG: <3 UNITS

## 2023-09-30 LAB
ANTI-CN-1A (NT5C1A) IBM: <20 UNITS
EJ AB SER QL: NEGATIVE
ENA JO1 AB SER IA-ACNC: <20 UNITS
ENA PM/SCL AB SER-ACNC: <20 UNITS
ENA SM+RNP AB SER IA-ACNC: <20 UNITS
ENA SS-A IGG SER QL: 30 UNITS
FIBRILLARIN AB SER QL: NEGATIVE
KU AB SER QL: NEGATIVE
MDA-5 (P140)(CADM-140): <20 UNITS
MI2 AB SER QL: NEGATIVE
NXP-2 (P140): <20 UNITS
OJ AB SER QL: NEGATIVE
PL12 AB SER QL: NEGATIVE
PL7 AB SER QL: NEGATIVE
SRP AB SERPL QL: NEGATIVE
TIF GAMMA (P155/140): <20 UNITS
U2 SNRNP AB SER QL: NEGATIVE

## 2023-10-15 DIAGNOSIS — R74.8 ABNORMAL LEVELS OF OTHER SERUM ENZYMES: ICD-10-CM

## 2023-10-20 ENCOUNTER — APPOINTMENT (OUTPATIENT)
Dept: NEUROLOGY | Facility: CLINIC | Age: 83
End: 2023-10-20
Payer: MEDICARE

## 2023-10-20 PROCEDURE — 93888 INTRACRANIAL LIMITED STUDY: CPT

## 2023-10-20 PROCEDURE — 93880 EXTRACRANIAL BILAT STUDY: CPT

## 2023-10-23 ENCOUNTER — NON-APPOINTMENT (OUTPATIENT)
Age: 83
End: 2023-10-23

## 2023-11-25 ENCOUNTER — INPATIENT (INPATIENT)
Facility: HOSPITAL | Age: 83
LOS: 4 days | Discharge: NURSING, REHAB OR LONG-TERM CARE | End: 2023-11-30
Attending: INTERNAL MEDICINE | Admitting: INTERNAL MEDICINE
Payer: MEDICARE

## 2023-11-25 VITALS
RESPIRATION RATE: 16 BRPM | OXYGEN SATURATION: 97 % | TEMPERATURE: 97 F | DIASTOLIC BLOOD PRESSURE: 81 MMHG | HEART RATE: 87 BPM | SYSTOLIC BLOOD PRESSURE: 187 MMHG

## 2023-11-25 DIAGNOSIS — T84.7XXA INFECTION AND INFLAMMATORY REACTION DUE TO OTHER INTERNAL ORTHOPEDIC PROSTHETIC DEVICES, IMPLANTS AND GRAFTS, INITIAL ENCOUNTER: Chronic | ICD-10-CM

## 2023-11-25 DIAGNOSIS — Z86.69 PERSONAL HISTORY OF OTHER DISEASES OF THE NERVOUS SYSTEM AND SENSE ORGANS: Chronic | ICD-10-CM

## 2023-11-25 DIAGNOSIS — Z98.890 OTHER SPECIFIED POSTPROCEDURAL STATES: Chronic | ICD-10-CM

## 2023-11-25 DIAGNOSIS — Z98.89 OTHER SPECIFIED POSTPROCEDURAL STATES: Chronic | ICD-10-CM

## 2023-11-25 DIAGNOSIS — S32.10XA UNSPECIFIED FRACTURE OF SACRUM, INITIAL ENCOUNTER FOR CLOSED FRACTURE: ICD-10-CM

## 2023-11-25 LAB
ALBUMIN SERPL ELPH-MCNC: 3.6 G/DL — SIGNIFICANT CHANGE UP (ref 3.3–5)
ALBUMIN SERPL ELPH-MCNC: 3.6 G/DL — SIGNIFICANT CHANGE UP (ref 3.3–5)
ALP SERPL-CCNC: 58 U/L — SIGNIFICANT CHANGE UP (ref 40–120)
ALP SERPL-CCNC: 58 U/L — SIGNIFICANT CHANGE UP (ref 40–120)
ALT FLD-CCNC: 22 U/L — SIGNIFICANT CHANGE UP (ref 4–33)
ALT FLD-CCNC: 22 U/L — SIGNIFICANT CHANGE UP (ref 4–33)
ANION GAP SERPL CALC-SCNC: 7 MMOL/L — SIGNIFICANT CHANGE UP (ref 7–14)
ANION GAP SERPL CALC-SCNC: 7 MMOL/L — SIGNIFICANT CHANGE UP (ref 7–14)
APTT BLD: 28.2 SEC — SIGNIFICANT CHANGE UP (ref 24.5–35.6)
APTT BLD: 28.2 SEC — SIGNIFICANT CHANGE UP (ref 24.5–35.6)
AST SERPL-CCNC: 22 U/L — SIGNIFICANT CHANGE UP (ref 4–32)
AST SERPL-CCNC: 22 U/L — SIGNIFICANT CHANGE UP (ref 4–32)
BASOPHILS # BLD AUTO: 0.02 K/UL — SIGNIFICANT CHANGE UP (ref 0–0.2)
BASOPHILS # BLD AUTO: 0.02 K/UL — SIGNIFICANT CHANGE UP (ref 0–0.2)
BASOPHILS NFR BLD AUTO: 0.2 % — SIGNIFICANT CHANGE UP (ref 0–2)
BASOPHILS NFR BLD AUTO: 0.2 % — SIGNIFICANT CHANGE UP (ref 0–2)
BILIRUB SERPL-MCNC: 0.2 MG/DL — SIGNIFICANT CHANGE UP (ref 0.2–1.2)
BILIRUB SERPL-MCNC: 0.2 MG/DL — SIGNIFICANT CHANGE UP (ref 0.2–1.2)
BUN SERPL-MCNC: 27 MG/DL — HIGH (ref 7–23)
BUN SERPL-MCNC: 27 MG/DL — HIGH (ref 7–23)
CALCIUM SERPL-MCNC: 9 MG/DL — SIGNIFICANT CHANGE UP (ref 8.4–10.5)
CALCIUM SERPL-MCNC: 9 MG/DL — SIGNIFICANT CHANGE UP (ref 8.4–10.5)
CHLORIDE SERPL-SCNC: 110 MMOL/L — HIGH (ref 98–107)
CHLORIDE SERPL-SCNC: 110 MMOL/L — HIGH (ref 98–107)
CO2 SERPL-SCNC: 26 MMOL/L — SIGNIFICANT CHANGE UP (ref 22–31)
CO2 SERPL-SCNC: 26 MMOL/L — SIGNIFICANT CHANGE UP (ref 22–31)
CREAT SERPL-MCNC: 0.83 MG/DL — SIGNIFICANT CHANGE UP (ref 0.5–1.3)
CREAT SERPL-MCNC: 0.83 MG/DL — SIGNIFICANT CHANGE UP (ref 0.5–1.3)
EGFR: 69 ML/MIN/1.73M2 — SIGNIFICANT CHANGE UP
EGFR: 69 ML/MIN/1.73M2 — SIGNIFICANT CHANGE UP
EOSINOPHIL # BLD AUTO: 0.07 K/UL — SIGNIFICANT CHANGE UP (ref 0–0.5)
EOSINOPHIL # BLD AUTO: 0.07 K/UL — SIGNIFICANT CHANGE UP (ref 0–0.5)
EOSINOPHIL NFR BLD AUTO: 0.7 % — SIGNIFICANT CHANGE UP (ref 0–6)
EOSINOPHIL NFR BLD AUTO: 0.7 % — SIGNIFICANT CHANGE UP (ref 0–6)
GLUCOSE SERPL-MCNC: 106 MG/DL — HIGH (ref 70–99)
GLUCOSE SERPL-MCNC: 106 MG/DL — HIGH (ref 70–99)
HCT VFR BLD CALC: 33 % — LOW (ref 34.5–45)
HCT VFR BLD CALC: 33 % — LOW (ref 34.5–45)
HGB BLD-MCNC: 10 G/DL — LOW (ref 11.5–15.5)
HGB BLD-MCNC: 10 G/DL — LOW (ref 11.5–15.5)
IANC: 8.87 K/UL — HIGH (ref 1.8–7.4)
IANC: 8.87 K/UL — HIGH (ref 1.8–7.4)
IMM GRANULOCYTES NFR BLD AUTO: 0.8 % — SIGNIFICANT CHANGE UP (ref 0–0.9)
IMM GRANULOCYTES NFR BLD AUTO: 0.8 % — SIGNIFICANT CHANGE UP (ref 0–0.9)
INR BLD: 0.97 RATIO — SIGNIFICANT CHANGE UP (ref 0.85–1.18)
INR BLD: 0.97 RATIO — SIGNIFICANT CHANGE UP (ref 0.85–1.18)
LYMPHOCYTES # BLD AUTO: 0.78 K/UL — LOW (ref 1–3.3)
LYMPHOCYTES # BLD AUTO: 0.78 K/UL — LOW (ref 1–3.3)
LYMPHOCYTES # BLD AUTO: 7.4 % — LOW (ref 13–44)
LYMPHOCYTES # BLD AUTO: 7.4 % — LOW (ref 13–44)
MCHC RBC-ENTMCNC: 23.4 PG — LOW (ref 27–34)
MCHC RBC-ENTMCNC: 23.4 PG — LOW (ref 27–34)
MCHC RBC-ENTMCNC: 30.3 GM/DL — LOW (ref 32–36)
MCHC RBC-ENTMCNC: 30.3 GM/DL — LOW (ref 32–36)
MCV RBC AUTO: 77.1 FL — LOW (ref 80–100)
MCV RBC AUTO: 77.1 FL — LOW (ref 80–100)
MONOCYTES # BLD AUTO: 0.78 K/UL — SIGNIFICANT CHANGE UP (ref 0–0.9)
MONOCYTES # BLD AUTO: 0.78 K/UL — SIGNIFICANT CHANGE UP (ref 0–0.9)
MONOCYTES NFR BLD AUTO: 7.4 % — SIGNIFICANT CHANGE UP (ref 2–14)
MONOCYTES NFR BLD AUTO: 7.4 % — SIGNIFICANT CHANGE UP (ref 2–14)
NEUTROPHILS # BLD AUTO: 8.87 K/UL — HIGH (ref 1.8–7.4)
NEUTROPHILS # BLD AUTO: 8.87 K/UL — HIGH (ref 1.8–7.4)
NEUTROPHILS NFR BLD AUTO: 83.5 % — HIGH (ref 43–77)
NEUTROPHILS NFR BLD AUTO: 83.5 % — HIGH (ref 43–77)
NRBC # BLD: 0 /100 WBCS — SIGNIFICANT CHANGE UP (ref 0–0)
NRBC # BLD: 0 /100 WBCS — SIGNIFICANT CHANGE UP (ref 0–0)
NRBC # FLD: 0 K/UL — SIGNIFICANT CHANGE UP (ref 0–0)
NRBC # FLD: 0 K/UL — SIGNIFICANT CHANGE UP (ref 0–0)
PLATELET # BLD AUTO: 223 K/UL — SIGNIFICANT CHANGE UP (ref 150–400)
PLATELET # BLD AUTO: 223 K/UL — SIGNIFICANT CHANGE UP (ref 150–400)
POTASSIUM SERPL-MCNC: 3.9 MMOL/L — SIGNIFICANT CHANGE UP (ref 3.5–5.3)
POTASSIUM SERPL-MCNC: 3.9 MMOL/L — SIGNIFICANT CHANGE UP (ref 3.5–5.3)
POTASSIUM SERPL-SCNC: 3.9 MMOL/L — SIGNIFICANT CHANGE UP (ref 3.5–5.3)
POTASSIUM SERPL-SCNC: 3.9 MMOL/L — SIGNIFICANT CHANGE UP (ref 3.5–5.3)
PROT SERPL-MCNC: 6.2 G/DL — SIGNIFICANT CHANGE UP (ref 6–8.3)
PROT SERPL-MCNC: 6.2 G/DL — SIGNIFICANT CHANGE UP (ref 6–8.3)
PROTHROM AB SERPL-ACNC: 10.9 SEC — SIGNIFICANT CHANGE UP (ref 9.5–13)
PROTHROM AB SERPL-ACNC: 10.9 SEC — SIGNIFICANT CHANGE UP (ref 9.5–13)
RBC # BLD: 4.28 M/UL — SIGNIFICANT CHANGE UP (ref 3.8–5.2)
RBC # BLD: 4.28 M/UL — SIGNIFICANT CHANGE UP (ref 3.8–5.2)
RBC # FLD: 18.1 % — HIGH (ref 10.3–14.5)
RBC # FLD: 18.1 % — HIGH (ref 10.3–14.5)
SODIUM SERPL-SCNC: 143 MMOL/L — SIGNIFICANT CHANGE UP (ref 135–145)
SODIUM SERPL-SCNC: 143 MMOL/L — SIGNIFICANT CHANGE UP (ref 135–145)
WBC # BLD: 10.6 K/UL — HIGH (ref 3.8–10.5)
WBC # BLD: 10.6 K/UL — HIGH (ref 3.8–10.5)
WBC # FLD AUTO: 10.6 K/UL — HIGH (ref 3.8–10.5)
WBC # FLD AUTO: 10.6 K/UL — HIGH (ref 3.8–10.5)

## 2023-11-25 PROCEDURE — 71260 CT THORAX DX C+: CPT | Mod: 26,MA

## 2023-11-25 PROCEDURE — 74177 CT ABD & PELVIS W/CONTRAST: CPT | Mod: 26,MA

## 2023-11-25 PROCEDURE — 72128 CT CHEST SPINE W/O DYE: CPT | Mod: 26,MA

## 2023-11-25 PROCEDURE — 70450 CT HEAD/BRAIN W/O DYE: CPT | Mod: 26,MA

## 2023-11-25 PROCEDURE — 72125 CT NECK SPINE W/O DYE: CPT | Mod: 26,MA

## 2023-11-25 PROCEDURE — 99285 EMERGENCY DEPT VISIT HI MDM: CPT | Mod: FS

## 2023-11-25 RX ORDER — MORPHINE SULFATE 50 MG/1
4 CAPSULE, EXTENDED RELEASE ORAL ONCE
Refills: 0 | Status: DISCONTINUED | OUTPATIENT
Start: 2023-11-25 | End: 2023-11-25

## 2023-11-25 RX ORDER — ACETAMINOPHEN 500 MG
650 TABLET ORAL EVERY 6 HOURS
Refills: 0 | Status: DISCONTINUED | OUTPATIENT
Start: 2023-11-25 | End: 2023-11-30

## 2023-11-25 RX ADMIN — MORPHINE SULFATE 4 MILLIGRAM(S): 50 CAPSULE, EXTENDED RELEASE ORAL at 22:17

## 2023-11-25 NOTE — ED ADULT TRIAGE NOTE - CHIEF COMPLAINT QUOTE
brought in by EMS from home s/p fall down 5 steps. Pt states her foot got caught on her coat and she fell backwards , first onto her buttocks then fell backwards, denies head trauma. C/o posterior neck pain. As per EMS they applied neck collar which pt could not tolerate so pt does not currently have collar on. PHx basal cell cancer, mandible cancer in remission. Pt on Plavix for clogged artery.

## 2023-11-25 NOTE — CONSULT NOTE ADULT - ASSESSMENT
Neo Shanique 83F pmhx carotid stenosis on plavix/HTN/HLD/OA/prior mandibular CA p/w mechanical fall, LBP. Small anterior lower sacral fx. Denies BLE numbness/tingling. Intact on exam.   -No neurosurgical intervention   -No further imaging needed   -Pain control/PT per primary. Can obtain donut pillow to help with pain when seating  -Outpatient follow up Dr. Yevgeniy Porter 3-4 weeks

## 2023-11-25 NOTE — ED PROVIDER NOTE - ATTENDING CONTRIBUTION TO CARE
83-year-old female with past medical history of carotid stenosis on Plavix, HTN, HLD, osteoarthritis (currently on prednisone taper), prior mandibular CA with reconstruction, upper extremity neuropathy who presented to the ER with neck pain. PE: Well appearing, RRR, CTA BL lungs, abd soft NTND A/P Labs, imaging, medicate, reassess

## 2023-11-25 NOTE — ED ADULT NURSE NOTE - NSFALLHARMRISKINTERV_ED_ALL_ED

## 2023-11-25 NOTE — CONSULT NOTE ADULT - SUBJECTIVE AND OBJECTIVE BOX
p ([id number])     HPI: 83F pmhx carotid stenosis on plavix/HTN/HLD/OA/prior mandibular CA with chronic neck pain from dermatitis p/w mechanical fall, LBP. Denies BLE numbness/tingling. Intact on exam.    Imaging: Small anterior lower sacral fracture    Exam: AOx3, ESPINAL 5/5, SILT    --Anticoagulation:    =====================  PAST MEDICAL HISTORY   Hypothyroid    Hyperlipidemia    Oral Cancer    Basal Cell Cancer    Malignant neoplasm of mandible    Malignant neoplasm of tongue    Radiation therapy complication    History of radiation therapy    Radiation dermatitis      PAST SURGICAL HISTORY   Cancer of Mandible    Hardware complicating wound infection    S/P biopsy    H/O right breast biopsy    History of cataract      No Known Allergies      MEDICATIONS:  Antibiotics:    Neuro:    Other:      SOCIAL HISTORY:   Occupation:   Marital Status:     FAMILY HISTORY:  Family history of MI (myocardial infarction) (Father)        ROS: Negative except per HPI    LABS:  PT/INR - ( 25 Nov 2023 17:59 )   PT: 10.9 sec;   INR: 0.97 ratio         PTT - ( 25 Nov 2023 17:59 )  PTT:28.2 sec                        10.0   10.60 )-----------( 223      ( 25 Nov 2023 17:59 )             33.0     11-25    143  |  110<H>  |  27<H>  ----------------------------<  106<H>  3.9   |  26  |  0.83    Ca    9.0      25 Nov 2023 17:59    TPro  6.2  /  Alb  3.6  /  TBili  0.2  /  DBili  x   /  AST  22  /  ALT  22  /  AlkPhos  58  11-25

## 2023-11-25 NOTE — ED PROVIDER NOTE - NSICDXPASTMEDICALHX_GEN_ALL_CORE_FT
PAST MEDICAL HISTORY:  Basal Cell Cancer biopsy of nose  5/2010    History of radiation therapy 2008    Hyperlipidemia     Hypothyroid     Malignant neoplasm of mandible left- 2008    Malignant neoplasm of tongue     Oral Cancer     Radiation dermatitis left neck    Radiation therapy complication burn to left neck area

## 2023-11-25 NOTE — ED PROVIDER NOTE - PHYSICAL EXAMINATION
c-spine: mild ttp over lower c-spine, no step offs  +ecchymosis to left lateral rib/flank over with tenderness  strength 5/5 in all extremities, sensation intact and equal b/l

## 2023-11-25 NOTE — ED PROVIDER NOTE - PROGRESS NOTE DETAILS
Consulted orthopedics, indicated to consult spine as it is an inferior sacrum fracture.  Paged spine, neurosurgery.   Cary Max MD PGY-1 Spoke to neurosurgery, agreed to see patient.  Cary Max MD PGY-1 paged hospitalist.   Cary Max MD PGY-1 Spoke to neurosurgery, agreed to see patient. indicated it would most likely be nonoperative with pt/ot.  Cary Max MD PGY-1 called Dr. espinoza, left a message. awaiting call back.   Cary Max MD PGY-1

## 2023-11-25 NOTE — ED PROVIDER NOTE - NSICDXPASTSURGICALHX_GEN_ALL_CORE_FT
PAST SURGICAL HISTORY:  Cancer of Mandible left side reconstructive surgery with bone graft from left leg 2008    H/O right breast biopsy 1980's    Hardware complicating wound infection S/P removal of hardware 6/2008    History of cataract left and right ; 6/2017, 12/2017    S/P biopsy tongue lesion 7/2014

## 2023-11-25 NOTE — ED PROVIDER NOTE - CLINICAL SUMMARY MEDICAL DECISION MAKING FREE TEXT BOX
83-year-old female with past medical history of carotid stenosis on Plavix, HTN, HLD, osteoarthritis (does follow with rheumatology currently on prednisone taper), prior mandibular CA with reconstruction, upper extremity neuropathy who presented to the ER with neck pain s/p fall today. Mechanical fall per hx. On exam pt is well appearing, vitals within normal, +ttp lower c-spine, ecchymosis to left lateral chest wall/flank area. Non focal neuro exam. Plan: cbc/cmp, pt/inr, CT head/c-spine, chest/abd/pelvis. Pt offered and declined pain medication

## 2023-11-25 NOTE — ED PROVIDER NOTE - OBJECTIVE STATEMENT
83-year-old female with past medical history of carotid stenosis on Plavix, HTN, HLD, osteoarthritis (does follow with rheumatology currently on prednisone taper), prior mandibular CA with reconstruction, upper extremity neuropathy who presented to the ER with neck pain.  Patient states she was walking up her stairs getting along tight jacket when she lost her footing and fell backwards down approximately 5 steps.  Patient reports pain to the lower neck 89-year-old female.  Patient denies LOC, headache, dizziness, vision changes, numbness, shortness of breath, palpitation, diffuse lower abdominal pain, n/v/d or any other concerns.

## 2023-11-25 NOTE — ED ADULT NURSE NOTE - OBJECTIVE STATEMENT
Pt brought in by family member for fall at home. Pt states she fell down while walking down flight of stairs. Pt endorses neck pain. Pt is alert and oriented x 3. Pt is ambulatory with a walker. Pt is also in Blood thinners (Plavix) for 2 carotid artery partial occlusion

## 2023-11-26 LAB
ALBUMIN SERPL ELPH-MCNC: 3.5 G/DL — SIGNIFICANT CHANGE UP (ref 3.3–5)
ALBUMIN SERPL ELPH-MCNC: 3.5 G/DL — SIGNIFICANT CHANGE UP (ref 3.3–5)
ALP SERPL-CCNC: 60 U/L — SIGNIFICANT CHANGE UP (ref 40–120)
ALP SERPL-CCNC: 60 U/L — SIGNIFICANT CHANGE UP (ref 40–120)
ALT FLD-CCNC: 16 U/L — SIGNIFICANT CHANGE UP (ref 4–33)
ALT FLD-CCNC: 16 U/L — SIGNIFICANT CHANGE UP (ref 4–33)
ANION GAP SERPL CALC-SCNC: 11 MMOL/L — SIGNIFICANT CHANGE UP (ref 7–14)
ANION GAP SERPL CALC-SCNC: 11 MMOL/L — SIGNIFICANT CHANGE UP (ref 7–14)
AST SERPL-CCNC: 18 U/L — SIGNIFICANT CHANGE UP (ref 4–32)
AST SERPL-CCNC: 18 U/L — SIGNIFICANT CHANGE UP (ref 4–32)
BASOPHILS # BLD AUTO: 0.03 K/UL — SIGNIFICANT CHANGE UP (ref 0–0.2)
BASOPHILS # BLD AUTO: 0.03 K/UL — SIGNIFICANT CHANGE UP (ref 0–0.2)
BASOPHILS NFR BLD AUTO: 0.4 % — SIGNIFICANT CHANGE UP (ref 0–2)
BASOPHILS NFR BLD AUTO: 0.4 % — SIGNIFICANT CHANGE UP (ref 0–2)
BILIRUB SERPL-MCNC: 0.4 MG/DL — SIGNIFICANT CHANGE UP (ref 0.2–1.2)
BILIRUB SERPL-MCNC: 0.4 MG/DL — SIGNIFICANT CHANGE UP (ref 0.2–1.2)
BUN SERPL-MCNC: 17 MG/DL — SIGNIFICANT CHANGE UP (ref 7–23)
BUN SERPL-MCNC: 17 MG/DL — SIGNIFICANT CHANGE UP (ref 7–23)
CALCIUM SERPL-MCNC: 8.3 MG/DL — LOW (ref 8.4–10.5)
CALCIUM SERPL-MCNC: 8.3 MG/DL — LOW (ref 8.4–10.5)
CHLORIDE SERPL-SCNC: 107 MMOL/L — SIGNIFICANT CHANGE UP (ref 98–107)
CHLORIDE SERPL-SCNC: 107 MMOL/L — SIGNIFICANT CHANGE UP (ref 98–107)
CO2 SERPL-SCNC: 24 MMOL/L — SIGNIFICANT CHANGE UP (ref 22–31)
CO2 SERPL-SCNC: 24 MMOL/L — SIGNIFICANT CHANGE UP (ref 22–31)
CREAT SERPL-MCNC: 0.8 MG/DL — SIGNIFICANT CHANGE UP (ref 0.5–1.3)
CREAT SERPL-MCNC: 0.8 MG/DL — SIGNIFICANT CHANGE UP (ref 0.5–1.3)
EGFR: 73 ML/MIN/1.73M2 — SIGNIFICANT CHANGE UP
EGFR: 73 ML/MIN/1.73M2 — SIGNIFICANT CHANGE UP
EOSINOPHIL # BLD AUTO: 0.23 K/UL — SIGNIFICANT CHANGE UP (ref 0–0.5)
EOSINOPHIL # BLD AUTO: 0.23 K/UL — SIGNIFICANT CHANGE UP (ref 0–0.5)
EOSINOPHIL NFR BLD AUTO: 3.3 % — SIGNIFICANT CHANGE UP (ref 0–6)
EOSINOPHIL NFR BLD AUTO: 3.3 % — SIGNIFICANT CHANGE UP (ref 0–6)
GLUCOSE SERPL-MCNC: 98 MG/DL — SIGNIFICANT CHANGE UP (ref 70–99)
GLUCOSE SERPL-MCNC: 98 MG/DL — SIGNIFICANT CHANGE UP (ref 70–99)
HCT VFR BLD CALC: 31.3 % — LOW (ref 34.5–45)
HCT VFR BLD CALC: 31.3 % — LOW (ref 34.5–45)
HGB BLD-MCNC: 9.6 G/DL — LOW (ref 11.5–15.5)
HGB BLD-MCNC: 9.6 G/DL — LOW (ref 11.5–15.5)
IANC: 5.33 K/UL — SIGNIFICANT CHANGE UP (ref 1.8–7.4)
IANC: 5.33 K/UL — SIGNIFICANT CHANGE UP (ref 1.8–7.4)
IMM GRANULOCYTES NFR BLD AUTO: 0.6 % — SIGNIFICANT CHANGE UP (ref 0–0.9)
IMM GRANULOCYTES NFR BLD AUTO: 0.6 % — SIGNIFICANT CHANGE UP (ref 0–0.9)
LYMPHOCYTES # BLD AUTO: 0.75 K/UL — LOW (ref 1–3.3)
LYMPHOCYTES # BLD AUTO: 0.75 K/UL — LOW (ref 1–3.3)
LYMPHOCYTES # BLD AUTO: 10.7 % — LOW (ref 13–44)
LYMPHOCYTES # BLD AUTO: 10.7 % — LOW (ref 13–44)
MCHC RBC-ENTMCNC: 23.3 PG — LOW (ref 27–34)
MCHC RBC-ENTMCNC: 23.3 PG — LOW (ref 27–34)
MCHC RBC-ENTMCNC: 30.7 GM/DL — LOW (ref 32–36)
MCHC RBC-ENTMCNC: 30.7 GM/DL — LOW (ref 32–36)
MCV RBC AUTO: 76 FL — LOW (ref 80–100)
MCV RBC AUTO: 76 FL — LOW (ref 80–100)
MONOCYTES # BLD AUTO: 0.61 K/UL — SIGNIFICANT CHANGE UP (ref 0–0.9)
MONOCYTES # BLD AUTO: 0.61 K/UL — SIGNIFICANT CHANGE UP (ref 0–0.9)
MONOCYTES NFR BLD AUTO: 8.7 % — SIGNIFICANT CHANGE UP (ref 2–14)
MONOCYTES NFR BLD AUTO: 8.7 % — SIGNIFICANT CHANGE UP (ref 2–14)
NEUTROPHILS # BLD AUTO: 5.33 K/UL — SIGNIFICANT CHANGE UP (ref 1.8–7.4)
NEUTROPHILS # BLD AUTO: 5.33 K/UL — SIGNIFICANT CHANGE UP (ref 1.8–7.4)
NEUTROPHILS NFR BLD AUTO: 76.3 % — SIGNIFICANT CHANGE UP (ref 43–77)
NEUTROPHILS NFR BLD AUTO: 76.3 % — SIGNIFICANT CHANGE UP (ref 43–77)
NRBC # BLD: 0 /100 WBCS — SIGNIFICANT CHANGE UP (ref 0–0)
NRBC # BLD: 0 /100 WBCS — SIGNIFICANT CHANGE UP (ref 0–0)
NRBC # FLD: 0 K/UL — SIGNIFICANT CHANGE UP (ref 0–0)
NRBC # FLD: 0 K/UL — SIGNIFICANT CHANGE UP (ref 0–0)
PLATELET # BLD AUTO: 186 K/UL — SIGNIFICANT CHANGE UP (ref 150–400)
PLATELET # BLD AUTO: 186 K/UL — SIGNIFICANT CHANGE UP (ref 150–400)
POTASSIUM SERPL-MCNC: 3.8 MMOL/L — SIGNIFICANT CHANGE UP (ref 3.5–5.3)
POTASSIUM SERPL-MCNC: 3.8 MMOL/L — SIGNIFICANT CHANGE UP (ref 3.5–5.3)
POTASSIUM SERPL-SCNC: 3.8 MMOL/L — SIGNIFICANT CHANGE UP (ref 3.5–5.3)
POTASSIUM SERPL-SCNC: 3.8 MMOL/L — SIGNIFICANT CHANGE UP (ref 3.5–5.3)
PROT SERPL-MCNC: 5.8 G/DL — LOW (ref 6–8.3)
PROT SERPL-MCNC: 5.8 G/DL — LOW (ref 6–8.3)
RBC # BLD: 4.12 M/UL — SIGNIFICANT CHANGE UP (ref 3.8–5.2)
RBC # BLD: 4.12 M/UL — SIGNIFICANT CHANGE UP (ref 3.8–5.2)
RBC # FLD: 17.9 % — HIGH (ref 10.3–14.5)
RBC # FLD: 17.9 % — HIGH (ref 10.3–14.5)
SODIUM SERPL-SCNC: 142 MMOL/L — SIGNIFICANT CHANGE UP (ref 135–145)
SODIUM SERPL-SCNC: 142 MMOL/L — SIGNIFICANT CHANGE UP (ref 135–145)
WBC # BLD: 6.99 K/UL — SIGNIFICANT CHANGE UP (ref 3.8–10.5)
WBC # BLD: 6.99 K/UL — SIGNIFICANT CHANGE UP (ref 3.8–10.5)
WBC # FLD AUTO: 6.99 K/UL — SIGNIFICANT CHANGE UP (ref 3.8–10.5)
WBC # FLD AUTO: 6.99 K/UL — SIGNIFICANT CHANGE UP (ref 3.8–10.5)

## 2023-11-26 RX ORDER — CLOPIDOGREL BISULFATE 75 MG/1
75 TABLET, FILM COATED ORAL DAILY
Refills: 0 | Status: DISCONTINUED | OUTPATIENT
Start: 2023-11-26 | End: 2023-11-30

## 2023-11-26 RX ORDER — OXYCODONE HYDROCHLORIDE 5 MG/1
5 TABLET ORAL EVERY 6 HOURS
Refills: 0 | Status: DISCONTINUED | OUTPATIENT
Start: 2023-11-26 | End: 2023-11-30

## 2023-11-26 RX ORDER — CLOPIDOGREL BISULFATE 75 MG/1
1 TABLET, FILM COATED ORAL
Refills: 0 | DISCHARGE

## 2023-11-26 RX ORDER — SIMVASTATIN 20 MG/1
1 TABLET, FILM COATED ORAL
Refills: 0 | DISCHARGE

## 2023-11-26 RX ORDER — LOSARTAN POTASSIUM 100 MG/1
100 TABLET, FILM COATED ORAL DAILY
Refills: 0 | Status: DISCONTINUED | OUTPATIENT
Start: 2023-11-26 | End: 2023-11-30

## 2023-11-26 RX ORDER — LEVOTHYROXINE SODIUM 125 MCG
125 TABLET ORAL DAILY
Refills: 0 | Status: DISCONTINUED | OUTPATIENT
Start: 2023-11-26 | End: 2023-11-30

## 2023-11-26 RX ORDER — ATORVASTATIN CALCIUM 80 MG/1
40 TABLET, FILM COATED ORAL AT BEDTIME
Refills: 0 | Status: DISCONTINUED | OUTPATIENT
Start: 2023-11-26 | End: 2023-11-30

## 2023-11-26 RX ORDER — LOSARTAN POTASSIUM 100 MG/1
1 TABLET, FILM COATED ORAL
Refills: 0 | DISCHARGE

## 2023-11-26 RX ORDER — OXYCODONE HYDROCHLORIDE 5 MG/1
2.5 TABLET ORAL EVERY 6 HOURS
Refills: 0 | Status: DISCONTINUED | OUTPATIENT
Start: 2023-11-26 | End: 2023-11-30

## 2023-11-26 RX ADMIN — Medication 650 MILLIGRAM(S): at 14:53

## 2023-11-26 RX ADMIN — Medication 650 MILLIGRAM(S): at 15:30

## 2023-11-26 RX ADMIN — CLOPIDOGREL BISULFATE 75 MILLIGRAM(S): 75 TABLET, FILM COATED ORAL at 12:49

## 2023-11-26 RX ADMIN — OXYCODONE HYDROCHLORIDE 5 MILLIGRAM(S): 5 TABLET ORAL at 12:49

## 2023-11-26 RX ADMIN — Medication 4 MILLIGRAM(S): at 18:37

## 2023-11-26 RX ADMIN — LOSARTAN POTASSIUM 100 MILLIGRAM(S): 100 TABLET, FILM COATED ORAL at 12:50

## 2023-11-26 RX ADMIN — OXYCODONE HYDROCHLORIDE 2.5 MILLIGRAM(S): 5 TABLET ORAL at 19:15

## 2023-11-26 RX ADMIN — ATORVASTATIN CALCIUM 40 MILLIGRAM(S): 80 TABLET, FILM COATED ORAL at 23:17

## 2023-11-26 RX ADMIN — OXYCODONE HYDROCHLORIDE 5 MILLIGRAM(S): 5 TABLET ORAL at 23:17

## 2023-11-26 RX ADMIN — OXYCODONE HYDROCHLORIDE 2.5 MILLIGRAM(S): 5 TABLET ORAL at 18:36

## 2023-11-26 NOTE — H&P ADULT - HISTORY OF PRESENT ILLNESS
· HPI Objective Statement: 83-year-old female with past medical history of carotid stenosis on Plavix, HTN, HLD, osteoarthritis (does follow with rheumatology currently on prednisone taper), prior mandibular CA with reconstruction, upper extremity neuropathy who presented to the ER with neck pain.  Patient states she was walking up her stairs getting along tight jacket when she lost her footing and fell backwards down approximately 5 steps.  Patient reports pain to the lower neck 89-year-old female.  Patient denies LOC, headache, dizziness, vision changes, numbness, shortness of breath, palpitation, diffuse lower abdominal pain, n/v/d or any other concerns.    Seen by Neurosurgery in ER-Small anterior lower sacral fx. Denies BLE numbness/tingling. Intact on exam.   -No neurosurgical intervention   -No further imaging needed

## 2023-11-26 NOTE — H&P ADULT - REASON FOR ADMISSION
HPI:    Patient ID: Ambreen Willis is a 60year old male. HPI  This 41-year-old male presents 1 week post ingrown toenail procedure right hallux. Patient is no specific complaints.   Review of Systems         Current Outpatient Prescriptions:  ramipril 10
Fall

## 2023-11-26 NOTE — PATIENT PROFILE ADULT - FUNCTIONAL SCREEN CURRENT LEVEL: COMMUNICATION, MLM
2 = difficulty speaking (not related to language barrier) 0 = understands/communicates without difficulty

## 2023-11-26 NOTE — H&P ADULT - NSHPPHYSICALEXAM_GEN_ALL_CORE
· Physical Examination: c-spine: mild ttp over lower c-spine, no step offs  	+ecchymosis to left lateral rib/flank over with tenderness  strength 5/5 in all extremities, sensation intact and equal b/l  · CONSTITUTIONAL: Well appearing, awake, alert, oriented to person, place, time/situation and in no apparent distress.  · CARDIAC: Normal rate, regular rhythm.  Heart sounds S1, S2.  · RESPIRATORY: Breath sounds clear and equal bilaterally.  · GASTROINTESTINAL: Abdomen soft, non-tender, no guarding.  · NEUROLOGICAL: Alert and oriented, no focal deficits, no motor or sensory deficits.

## 2023-11-26 NOTE — PHYSICAL THERAPY INITIAL EVALUATION ADULT - ADDITIONAL COMMENTS
Pt lives with her  and son in a house with 2 stairs to enter via the front entrance and 1 step to enter from the garage; Pt has an additional 12 steps to get to her bedroom and the rest of the house (split level). prior to admission Pt required assistance during stair mobility and pt ambulated with a rollator. Pt stated she required assistance with ADLs and had a home health aide 4 hours for 3 days a week. Pt endorsed 1 fall over the last 6 months.     Pt. left comfortable in bed with all tubes/lines intact, head of the bed elevated, call bell in reach and in NAD.

## 2023-11-26 NOTE — PHYSICAL THERAPY INITIAL EVALUATION ADULT - PERTINENT HX OF CURRENT PROBLEM, REHAB EVAL
Pt is an 83-year-old female who presented to the hospital with neck pain status post fall.    CT:  1. Suspect a small buckle fracture in the inferior sacrum.  2. No acute visceral injury in the chest, abdomen and pelvis.  3. No acute fracture or traumatic subluxation in the thoracic spine.    Cervical spine CT: Degenerative changes. No acute fractures or dislocations

## 2023-11-26 NOTE — PATIENT PROFILE ADULT - DO YOU FEEL THREATENED BY OTHERS?
Pt ambulatory to ED with c/o abd pain;  Pt sts pain started while eating his dinner;  Pt denies any urinary problems;   Pt sts he had a BM today;  Denies any n/v;  Pt in NAD in triage pt able to jump in triage without any problems
no

## 2023-11-26 NOTE — PHYSICAL THERAPY INITIAL EVALUATION ADULT - GENERAL OBSERVATIONS, REHAB EVAL
Pt received semi-supine, NAD. oxygen saturation: 98% on room air, pt's daughter present at bedside throughout

## 2023-11-26 NOTE — H&P ADULT - TIME BILLING
- Review of records, telemetry, vital signs and daily labs.   - General and cardiovascular physical examination.  - Generation of cardiovascular treatment plan.  - Coordination of care.      Patient was seen and examined by me on  11/26/2023,interim events noted,labs and radiology studies reviewed.  Anthony Orellana MD,FACC.  [address] [place], [place] [id number].  [phone]

## 2023-11-26 NOTE — H&P ADULT - NSHPLABSRESULTS_GEN_ALL_CORE
PT/INR - ( 25 Nov 2023 17:59 )   PT: 10.9 sec;   INR: 0.97 ratio         PTT - ( 25 Nov 2023 17:59 )  PTT:28.2 sec                        10.0   10.60 )-----------( 223      ( 25 Nov 2023 17:59 )             33.0     11-25    143  |  110<H>  |  27<H>  ----------------------------<  106<H>  3.9   |  26  |  0.83    Ca    9.0      25 Nov 2023 17:59    TPro  6.2  /  Alb  3.6  /  TBili  0.2  /  DBili  x   /  AST  22  /  ALT  22  /  AlkPhos  58  11-25          CT Chest w/ IV Cont (11.25.23 @ 19:37) >    IMPRESSION:    1. Suspect a small buckle fracture in the inferior sacrum.  2. No acute visceral injury in the chest, abdomen and pelvis.  3. No acute fracture or traumatic subluxation in the thoracic spine.        < end of copied text >

## 2023-11-26 NOTE — PHYSICAL THERAPY INITIAL EVALUATION ADULT - MANUAL MUSCLE TESTING RESULTS, REHAB EVAL
right upper extremity: 3-/5, left upper extremity: 3-/5, right lower extremity: 3-/5, left lower extremity: 2+/5/grossly assessed due to

## 2023-11-26 NOTE — H&P ADULT - ASSESSMENT
83-year-old female with past medical history of carotid stenosis on Plavix, HTN, HLD, osteoarthritis (does follow with rheumatology currently on prednisone taper), prior mandibular CA with reconstruction, upper extremity neuropathy who presented to the ER with neck pain.      #FALL  Mechanical  NNoted sacral Fx  No acute cervical spine injury  Pain management  PT  Will need RANDY

## 2023-11-26 NOTE — PATIENT PROFILE ADULT - FALL HARM RISK - HARM RISK INTERVENTIONS

## 2023-11-26 NOTE — PATIENT PROFILE ADULT - ABILITY TO HEAR (WITH HEARING AID OR HEARING APPLIANCE IF NORMALLY USED):
[place]  left ear/Mildly to Moderately Impaired: difficulty hearing in some environments or speaker may need to increase volume or speak distinctly

## 2023-11-26 NOTE — PHYSICAL THERAPY INITIAL EVALUATION ADULT - PASSIVE RANGE OF MOTION EXAMINATION, REHAB EVAL
right shoulder flexion: 0-20 degrees, within functional limits at elbow, left shoulder flexion: 0-30 degrees, within functional limits at elbow/bilateral lower extremity Passive ROM was WFL (within functional limits)/deficits as listed below

## 2023-11-27 LAB
ANION GAP SERPL CALC-SCNC: 10 MMOL/L — SIGNIFICANT CHANGE UP (ref 7–14)
ANION GAP SERPL CALC-SCNC: 10 MMOL/L — SIGNIFICANT CHANGE UP (ref 7–14)
BUN SERPL-MCNC: 14 MG/DL — SIGNIFICANT CHANGE UP (ref 7–23)
BUN SERPL-MCNC: 14 MG/DL — SIGNIFICANT CHANGE UP (ref 7–23)
CALCIUM SERPL-MCNC: 7.8 MG/DL — LOW (ref 8.4–10.5)
CALCIUM SERPL-MCNC: 7.8 MG/DL — LOW (ref 8.4–10.5)
CHLORIDE SERPL-SCNC: 105 MMOL/L — SIGNIFICANT CHANGE UP (ref 98–107)
CHLORIDE SERPL-SCNC: 105 MMOL/L — SIGNIFICANT CHANGE UP (ref 98–107)
CO2 SERPL-SCNC: 23 MMOL/L — SIGNIFICANT CHANGE UP (ref 22–31)
CO2 SERPL-SCNC: 23 MMOL/L — SIGNIFICANT CHANGE UP (ref 22–31)
CREAT SERPL-MCNC: 0.82 MG/DL — SIGNIFICANT CHANGE UP (ref 0.5–1.3)
CREAT SERPL-MCNC: 0.82 MG/DL — SIGNIFICANT CHANGE UP (ref 0.5–1.3)
EGFR: 70 ML/MIN/1.73M2 — SIGNIFICANT CHANGE UP
EGFR: 70 ML/MIN/1.73M2 — SIGNIFICANT CHANGE UP
GLUCOSE SERPL-MCNC: 109 MG/DL — HIGH (ref 70–99)
GLUCOSE SERPL-MCNC: 109 MG/DL — HIGH (ref 70–99)
HCT VFR BLD CALC: 31.2 % — LOW (ref 34.5–45)
HCT VFR BLD CALC: 31.2 % — LOW (ref 34.5–45)
HGB BLD-MCNC: 9.2 G/DL — LOW (ref 11.5–15.5)
HGB BLD-MCNC: 9.2 G/DL — LOW (ref 11.5–15.5)
MAGNESIUM SERPL-MCNC: 2 MG/DL — SIGNIFICANT CHANGE UP (ref 1.6–2.6)
MAGNESIUM SERPL-MCNC: 2 MG/DL — SIGNIFICANT CHANGE UP (ref 1.6–2.6)
MCHC RBC-ENTMCNC: 23 PG — LOW (ref 27–34)
MCHC RBC-ENTMCNC: 23 PG — LOW (ref 27–34)
MCHC RBC-ENTMCNC: 29.5 GM/DL — LOW (ref 32–36)
MCHC RBC-ENTMCNC: 29.5 GM/DL — LOW (ref 32–36)
MCV RBC AUTO: 78 FL — LOW (ref 80–100)
MCV RBC AUTO: 78 FL — LOW (ref 80–100)
NRBC # BLD: 0 /100 WBCS — SIGNIFICANT CHANGE UP (ref 0–0)
NRBC # BLD: 0 /100 WBCS — SIGNIFICANT CHANGE UP (ref 0–0)
NRBC # FLD: 0 K/UL — SIGNIFICANT CHANGE UP (ref 0–0)
NRBC # FLD: 0 K/UL — SIGNIFICANT CHANGE UP (ref 0–0)
PHOSPHATE SERPL-MCNC: 2.9 MG/DL — SIGNIFICANT CHANGE UP (ref 2.5–4.5)
PHOSPHATE SERPL-MCNC: 2.9 MG/DL — SIGNIFICANT CHANGE UP (ref 2.5–4.5)
PLATELET # BLD AUTO: 192 K/UL — SIGNIFICANT CHANGE UP (ref 150–400)
PLATELET # BLD AUTO: 192 K/UL — SIGNIFICANT CHANGE UP (ref 150–400)
POTASSIUM SERPL-MCNC: 4 MMOL/L — SIGNIFICANT CHANGE UP (ref 3.5–5.3)
POTASSIUM SERPL-MCNC: 4 MMOL/L — SIGNIFICANT CHANGE UP (ref 3.5–5.3)
POTASSIUM SERPL-SCNC: 4 MMOL/L — SIGNIFICANT CHANGE UP (ref 3.5–5.3)
POTASSIUM SERPL-SCNC: 4 MMOL/L — SIGNIFICANT CHANGE UP (ref 3.5–5.3)
RBC # BLD: 4 M/UL — SIGNIFICANT CHANGE UP (ref 3.8–5.2)
RBC # BLD: 4 M/UL — SIGNIFICANT CHANGE UP (ref 3.8–5.2)
RBC # FLD: 18.2 % — HIGH (ref 10.3–14.5)
RBC # FLD: 18.2 % — HIGH (ref 10.3–14.5)
SODIUM SERPL-SCNC: 138 MMOL/L — SIGNIFICANT CHANGE UP (ref 135–145)
SODIUM SERPL-SCNC: 138 MMOL/L — SIGNIFICANT CHANGE UP (ref 135–145)
WBC # BLD: 7.39 K/UL — SIGNIFICANT CHANGE UP (ref 3.8–10.5)
WBC # BLD: 7.39 K/UL — SIGNIFICANT CHANGE UP (ref 3.8–10.5)
WBC # FLD AUTO: 7.39 K/UL — SIGNIFICANT CHANGE UP (ref 3.8–10.5)
WBC # FLD AUTO: 7.39 K/UL — SIGNIFICANT CHANGE UP (ref 3.8–10.5)

## 2023-11-27 RX ORDER — ENOXAPARIN SODIUM 100 MG/ML
40 INJECTION SUBCUTANEOUS EVERY 24 HOURS
Refills: 0 | Status: DISCONTINUED | OUTPATIENT
Start: 2023-11-27 | End: 2023-11-30

## 2023-11-27 RX ADMIN — Medication 200 MILLIGRAM(S): at 22:23

## 2023-11-27 RX ADMIN — ATORVASTATIN CALCIUM 40 MILLIGRAM(S): 80 TABLET, FILM COATED ORAL at 22:23

## 2023-11-27 RX ADMIN — Medication 200 MILLIGRAM(S): at 00:30

## 2023-11-27 RX ADMIN — OXYCODONE HYDROCHLORIDE 5 MILLIGRAM(S): 5 TABLET ORAL at 18:43

## 2023-11-27 RX ADMIN — CLOPIDOGREL BISULFATE 75 MILLIGRAM(S): 75 TABLET, FILM COATED ORAL at 13:32

## 2023-11-27 RX ADMIN — OXYCODONE HYDROCHLORIDE 5 MILLIGRAM(S): 5 TABLET ORAL at 00:17

## 2023-11-27 RX ADMIN — Medication 5 MILLIGRAM(S): at 05:33

## 2023-11-27 RX ADMIN — Medication 125 MICROGRAM(S): at 05:32

## 2023-11-27 RX ADMIN — LOSARTAN POTASSIUM 100 MILLIGRAM(S): 100 TABLET, FILM COATED ORAL at 05:33

## 2023-11-27 RX ADMIN — ENOXAPARIN SODIUM 40 MILLIGRAM(S): 100 INJECTION SUBCUTANEOUS at 18:15

## 2023-11-27 RX ADMIN — OXYCODONE HYDROCHLORIDE 5 MILLIGRAM(S): 5 TABLET ORAL at 06:32

## 2023-11-27 RX ADMIN — OXYCODONE HYDROCHLORIDE 5 MILLIGRAM(S): 5 TABLET ORAL at 18:13

## 2023-11-27 RX ADMIN — OXYCODONE HYDROCHLORIDE 5 MILLIGRAM(S): 5 TABLET ORAL at 05:32

## 2023-11-27 RX ADMIN — Medication 4 MILLIGRAM(S): at 18:14

## 2023-11-28 PROCEDURE — 73560 X-RAY EXAM OF KNEE 1 OR 2: CPT | Mod: 26,LT

## 2023-11-28 RX ADMIN — OXYCODONE HYDROCHLORIDE 5 MILLIGRAM(S): 5 TABLET ORAL at 12:49

## 2023-11-28 RX ADMIN — Medication 4 MILLIGRAM(S): at 18:38

## 2023-11-28 RX ADMIN — LOSARTAN POTASSIUM 100 MILLIGRAM(S): 100 TABLET, FILM COATED ORAL at 06:55

## 2023-11-28 RX ADMIN — Medication 125 MICROGRAM(S): at 05:47

## 2023-11-28 RX ADMIN — ATORVASTATIN CALCIUM 40 MILLIGRAM(S): 80 TABLET, FILM COATED ORAL at 21:06

## 2023-11-28 RX ADMIN — CLOPIDOGREL BISULFATE 75 MILLIGRAM(S): 75 TABLET, FILM COATED ORAL at 12:19

## 2023-11-28 RX ADMIN — OXYCODONE HYDROCHLORIDE 5 MILLIGRAM(S): 5 TABLET ORAL at 22:00

## 2023-11-28 RX ADMIN — OXYCODONE HYDROCHLORIDE 5 MILLIGRAM(S): 5 TABLET ORAL at 21:07

## 2023-11-28 RX ADMIN — Medication 5 MILLIGRAM(S): at 06:54

## 2023-11-28 RX ADMIN — Medication 200 MILLIGRAM(S): at 21:06

## 2023-11-28 RX ADMIN — ENOXAPARIN SODIUM 40 MILLIGRAM(S): 100 INJECTION SUBCUTANEOUS at 18:39

## 2023-11-28 RX ADMIN — OXYCODONE HYDROCHLORIDE 5 MILLIGRAM(S): 5 TABLET ORAL at 12:19

## 2023-11-28 NOTE — DIETITIAN INITIAL EVALUATION ADULT - PERTINENT LABORATORY DATA
11-27    138  |  105  |  14  ----------------------------<  109<H>  4.0   |  23  |  0.82    Ca    7.8<L>      27 Nov 2023 04:05  Phos  2.9     11-27  Mg     2.00     11-27

## 2023-11-28 NOTE — DIETITIAN INITIAL EVALUATION ADULT - ADD RECOMMEND
1. Encourage PO intake and provide assistance with meals as needed.  2. Monitor PO intake, nutrition-related lab values, weights, BMs/GI distress, skin integrity, hydration status, edema. 1. Encourage PO intake and provide assistance with meals as needed.  2. Recommend bowel regimen.  3. Monitor PO intake, nutrition-related lab values, weights, BMs/GI distress, skin integrity, hydration status, edema.

## 2023-11-28 NOTE — DIETITIAN INITIAL EVALUATION ADULT - ORAL INTAKE PTA/DIET HISTORY
Pt reported usually good appetite and eating balanced meals, usually 2 meals per day (late breakfast, dinner, and snack in between) PTA. Pt takes Vitamin D3 and Calcium supplements at home.

## 2023-11-28 NOTE — DIETITIAN INITIAL EVALUATION ADULT - NSFNSGIIOFT_GEN_A_CORE
11-27-23 @ 07:01  -  11-28-23 @ 07:00  --------------------------------------------------------  OUT:    Stool (mL): 0 mL  Total OUT: 0 mL    Total NET: 0 mL

## 2023-11-28 NOTE — DIETITIAN INITIAL EVALUATION ADULT - OTHER INFO
Per chart review, pt is a 83 year old female with PMHx of carotid stenosis on Plavix, HTN, HLD, osteoarthritis (does follow with rheumatology currently on prednisone taper), prior mandibular CA with reconstruction, upper extremity neuropathy who presented to the ER with neck pain. Found with fracture of sacrum.    Last BM 11/24 per RN flow sheet. CBW: 183 lb. Per [facility] ABDULLAHI, wt history: 180 lb (10/18), 175 lb (8/24), 180 lb (7/6), 192 lb (12/8/22), 180 lb (7/27/22). Labs reviewed. Per chart review, pt is a 83 year old female with PMHx of carotid stenosis on Plavix, HTN, HLD, osteoarthritis (does follow with rheumatology currently on prednisone taper), prior mandibular CA with reconstruction, upper extremity neuropathy who presented to the ER with neck pain. Found with fracture of sacrum.    Noted with PO intake of 26-50% per RN flow sheet. Last BM 11/24 per RN flow sheet. CBW: 183 lb. Per [facility] ABDULLAHI, wt history: 180 lb (10/18), 175 lb (8/24), 180 lb (7/6), 192 lb (12/8/22), 180 lb (7/27/22). Labs reviewed. Per chart review, pt is a 83 year old female with PMHx of carotid stenosis on Plavix, HTN, HLD, osteoarthritis (does follow with rheumatology currently on prednisone taper), prior mandibular CA with reconstruction, upper extremity neuropathy who presented to the ER with neck pain. Found with fracture of sacrum.    Pt seen at bedside, observed ~50% of lunch tray consumed. Noted with PO intake of 26-50% per RN flow sheet. Pt reported decreased appetite over the past few days in the hospital. Pt tolerating soft and bite sized consistency but does not particularly like it, would like to try a more advanced consistency. Denied N/V/D/C, last BM 11/24 per RN flow sheet, recommend bowel regimen. Reported UBW of 175-180 lb, wt usually stable. CBW: 183 lb. Per [facility] ABDULLAHI, wt history: 180 lb (10/18), 175 lb (8/24), 180 lb (7/6), 192 lb (12/8/22), 180 lb (7/27/22). Labs reviewed. RD to remain available. Per chart review, pt is a 83 year old female with PMHx of carotid stenosis on Plavix, HTN, HLD, osteoarthritis (does follow with rheumatology currently on prednisone taper), prior mandibular CA with reconstruction, upper extremity neuropathy who presented to the ER with neck pain. Found with fracture of sacrum.    Pt seen at bedside, observed ~50% of lunch tray consumed. Noted with PO intake of 26-50% per RN flow sheet. Pt reported decreased appetite over the past few days in the hospital. Pt reported chewing and swallowing difficulties, tolerating soft and bite sized consistency but does not particularly like it, would like to try a more advanced consistency. Denied N/V/D/C, last BM 11/24 per RN flow sheet, recommend bowel regimen. Reported UBW of 175-180 lb, wt usually stable. CBW: 183 lb. Per [facility] ABDULLAHI, wt history: 180 lb (10/18), 175 lb (8/24), 180 lb (7/6), 192 lb (12/8/22), 180 lb (7/27/22). Labs reviewed. RD to remain available. Per chart review, pt is a 83 year old female with PMHx of carotid stenosis on Plavix, HTN, HLD, osteoarthritis (does follow with rheumatology currently on prednisone taper), prior mandibular CA with reconstruction, upper extremity neuropathy who presented to the ER with neck pain. Found with fracture of sacrum.    Pt seen at bedside, observed ~50% of lunch tray consumed. Noted with PO intake of 26-50% per RN flow sheet. Pt reported decreased appetite over the past few days in the hospital. Pt reported chewing and swallowing difficulties, tolerating soft and bite sized consistency but does not particularly like it, would like to try a more advanced consistency. Pt requesting chocolate ice cream. Denied N/V/D/C, last BM 11/24 per RN flow sheet, recommend bowel regimen. Reported UBW of 175-180 lb, wt usually stable. CBW: 183 lb. Per [place] [facility], wt history: 180 lb (10/18), 175 lb (8/24), 180 lb (7/6), 192 lb (12/8/22), 180 lb (7/27/22). Labs reviewed. RD to remain available.

## 2023-11-28 NOTE — DIETITIAN INITIAL EVALUATION ADULT - PERTINENT MEDS FT
MEDICATIONS  (STANDING):  atorvastatin 40 milliGRAM(s) Oral at bedtime  clopidogrel Tablet 75 milliGRAM(s) Oral daily  enoxaparin Injectable 40 milliGRAM(s) SubCutaneous every 24 hours  levothyroxine 125 MICROGram(s) Oral daily  losartan 100 milliGRAM(s) Oral daily  predniSONE   Tablet 4 milliGRAM(s) Oral <User Schedule>  predniSONE   Tablet 5 milliGRAM(s) Oral <User Schedule>  pregabalin 200 milliGRAM(s) Oral at bedtime    MEDICATIONS  (PRN):  acetaminophen     Tablet .. 650 milliGRAM(s) Oral every 6 hours PRN Temp greater or equal to 38C (100.4F), Mild Pain (1 - 3)  oxyCODONE    IR 2.5 milliGRAM(s) Oral every 6 hours PRN Moderate Pain (4 - 6)  oxyCODONE    IR 5 milliGRAM(s) Oral every 6 hours PRN Severe Pain (7 - 10)

## 2023-11-29 RX ADMIN — ENOXAPARIN SODIUM 40 MILLIGRAM(S): 100 INJECTION SUBCUTANEOUS at 18:34

## 2023-11-29 RX ADMIN — OXYCODONE HYDROCHLORIDE 5 MILLIGRAM(S): 5 TABLET ORAL at 03:21

## 2023-11-29 RX ADMIN — Medication 125 MICROGRAM(S): at 05:21

## 2023-11-29 RX ADMIN — Medication 4 MILLIGRAM(S): at 18:35

## 2023-11-29 RX ADMIN — ATORVASTATIN CALCIUM 40 MILLIGRAM(S): 80 TABLET, FILM COATED ORAL at 21:39

## 2023-11-29 RX ADMIN — OXYCODONE HYDROCHLORIDE 5 MILLIGRAM(S): 5 TABLET ORAL at 13:17

## 2023-11-29 RX ADMIN — CLOPIDOGREL BISULFATE 75 MILLIGRAM(S): 75 TABLET, FILM COATED ORAL at 12:47

## 2023-11-29 RX ADMIN — LOSARTAN POTASSIUM 100 MILLIGRAM(S): 100 TABLET, FILM COATED ORAL at 06:19

## 2023-11-29 RX ADMIN — Medication 200 MILLIGRAM(S): at 21:39

## 2023-11-29 RX ADMIN — Medication 5 MILLIGRAM(S): at 06:19

## 2023-11-29 RX ADMIN — OXYCODONE HYDROCHLORIDE 5 MILLIGRAM(S): 5 TABLET ORAL at 12:47

## 2023-11-29 RX ADMIN — OXYCODONE HYDROCHLORIDE 5 MILLIGRAM(S): 5 TABLET ORAL at 04:20

## 2023-11-29 NOTE — PROGRESS NOTE ADULT - SUBJECTIVE AND OBJECTIVE BOX
SUBJECTIVE / OVERNIGHT EVENTS:pt seen and examined  11-27-23    MEDICATIONS  (STANDING):  atorvastatin 40 milliGRAM(s) Oral at bedtime  clopidogrel Tablet 75 milliGRAM(s) Oral daily  enoxaparin Injectable 40 milliGRAM(s) SubCutaneous every 24 hours  levothyroxine 125 MICROGram(s) Oral daily  losartan 100 milliGRAM(s) Oral daily  predniSONE   Tablet 4 milliGRAM(s) Oral <User Schedule>  predniSONE   Tablet 5 milliGRAM(s) Oral <User Schedule>  pregabalin 200 milliGRAM(s) Oral at bedtime    MEDICATIONS  (PRN):  acetaminophen     Tablet .. 650 milliGRAM(s) Oral every 6 hours PRN Temp greater or equal to 38C (100.4F), Mild Pain (1 - 3)  oxyCODONE    IR 2.5 milliGRAM(s) Oral every 6 hours PRN Moderate Pain (4 - 6)  oxyCODONE    IR 5 milliGRAM(s) Oral every 6 hours PRN Severe Pain (7 - 10)    T(C): 36.7 (11-28-23 @ 01:25), Max: 37.2 (11-27-23 @ 17:27)  HR: 80 (11-28-23 @ 01:25) (78 - 81)  BP: 118/47 (11-28-23 @ 01:25) (101/51 - 141/46)  RR: 18 (11-28-23 @ 01:25) (18 - 19)  SpO2: 95% (11-28-23 @ 01:25) (95% - 100%)    CAPILLARY BLOOD GLUCOSE        I&O's Summary    26 Nov 2023 07:01 - 27 Nov 2023 07:00  --------------------------------------------------------  IN: 950 mL / OUT: 501 mL / NET: 449 mL    27 Nov 2023 07:01  -  28 Nov 2023 01:41  --------------------------------------------------------  IN: 240 mL / OUT: 400 mL / NET: -160 mL        Constitutional: No fever, fatigue  Skin: No rash.  Eyes: No recent vision problems or eye pain.  ENT: No congestion, ear pain, or sore throat.  Cardiovascular: No chest pain or palpation.  Respiratory: No cough, shortness of breath, congestion, or wheezing.  Gastrointestinal: No abdominal pain, nausea, vomiting, or diarrhea.  Genitourinary: No dysuria.  Musculoskeletal: No joint swelling.  Neurologic: No headache.    PHYSICAL EXAM:  GENERAL: NAD  EYES: EOMI, PERRLA  NECK: Supple, No JVD  CHEST/LUNG: dec breath sounds at bases  HEART:  S1 , S2 +  ABDOMEN: soft , bs+  EXTREMITIES:  no edema  NEUROLOGY:alert awake    LABS:                        9.2    7.39  )-----------( 192      ( 27 Nov 2023 04:05 )             31.2     11-27    138  |  105  |  14  ----------------------------<  109<H>  4.0   |  23  |  0.82    Ca    7.8<L>      27 Nov 2023 04:05  Phos  2.9     11-27  Mg     2.00     11-27    TPro  5.8<L>  /  Alb  3.5  /  TBili  0.4  /  DBili  x   /  AST  18  /  ALT  16  /  AlkPhos  60  11-26          Urinalysis Basic - ( 27 Nov 2023 04:05 )    Color: x / Appearance: x / SG: x / pH: x  Gluc: 109 mg/dL / Ketone: x  / Bili: x / Urobili: x   Blood: x / Protein: x / Nitrite: x   Leuk Esterase: x / RBC: x / WBC x   Sq Epi: x / Non Sq Epi: x / Bacteria: x        RADIOLOGY & ADDITIONAL TESTS:    Imaging Personally Reviewed:    Consultant(s) Notes Reviewed:      Care Discussed with Consultants/Other Providers:  
    SUBJECTIVE / OVERNIGHT EVENTS:pt seen and examined  11-28-23    MEDICATIONS  (STANDING):  atorvastatin 40 milliGRAM(s) Oral at bedtime  clopidogrel Tablet 75 milliGRAM(s) Oral daily  enoxaparin Injectable 40 milliGRAM(s) SubCutaneous every 24 hours  levothyroxine 125 MICROGram(s) Oral daily  losartan 100 milliGRAM(s) Oral daily  predniSONE   Tablet 4 milliGRAM(s) Oral <User Schedule>  predniSONE   Tablet 5 milliGRAM(s) Oral <User Schedule>  pregabalin 200 milliGRAM(s) Oral at bedtime    MEDICATIONS  (PRN):  acetaminophen     Tablet .. 650 milliGRAM(s) Oral every 6 hours PRN Temp greater or equal to 38C (100.4F), Mild Pain (1 - 3)  oxyCODONE    IR 2.5 milliGRAM(s) Oral every 6 hours PRN Moderate Pain (4 - 6)  oxyCODONE    IR 5 milliGRAM(s) Oral every 6 hours PRN Severe Pain (7 - 10)    Vital Signs Last 24 Hrs  T(C): 36.9 (11-28-23 @ 20:41), Max: 37 (11-28-23 @ 17:52)  T(F): 98.5 (11-28-23 @ 20:41), Max: 98.6 (11-28-23 @ 17:52)  HR: 75 (11-28-23 @ 20:41) (61 - 81)  BP: 126/66 (11-28-23 @ 20:41) (118/47 - 142/66)  BP(mean): --  RR: 19 (11-28-23 @ 20:41) (18 - 19)  SpO2: 97% (11-28-23 @ 20:41) (95% - 100%)          Constitutional: No fever, fatigue  Skin: No rash.  Eyes: No recent vision problems or eye pain.  ENT: No congestion, ear pain, or sore throat.  Cardiovascular: No chest pain or palpation.  Respiratory: No cough, shortness of breath, congestion, or wheezing.  Gastrointestinal: No abdominal pain, nausea, vomiting, or diarrhea.  Genitourinary: No dysuria.  Musculoskeletal: No joint swelling.  Neurologic: No headache.    PHYSICAL EXAM:  GENERAL: NAD  EYES: EOMI, PERRLA  NECK: Supple, No JVD  CHEST/LUNG: dec breath sounds at bases  HEART:  S1 , S2 +  ABDOMEN: soft , bs+  EXTREMITIES:  no edema  NEUROLOGY:alert awake    LABS:  11-27    138  |  105  |  14  ----------------------------<  109<H>  4.0   |  23  |  0.82    Ca    7.8<L>      27 Nov 2023 04:05  Phos  2.9     11-27  Mg     2.00     11-27      Creatinine Trend: 0.82 <--, 0.80 <--, 0.83 <--                        9.2    7.39  )-----------( 192      ( 27 Nov 2023 04:05 )             31.2     Urine Studies:  Urinalysis Basic - ( 27 Nov 2023 04:05 )    Color:  / Appearance:  / SG:  / pH:   Gluc: 109 mg/dL / Ketone:   / Bili:  / Urobili:    Blood:  / Protein:  / Nitrite:    Leuk Esterase:  / RBC:  / WBC    Sq Epi:  / Non Sq Epi:  / Bacteria:                         RADIOLOGY & ADDITIONAL TESTS:    Imaging Personally Reviewed:    Consultant(s) Notes Reviewed:      Care Discussed with Consultants/Other Providers:  
MR#[id number]  PATIENT NAME:-[facility] COURSE: HPI:  · HPI Objective Statement: 83-year-old female with past medical history of carotid stenosis on Plavix, HTN, HLD, osteoarthritis (does follow with rheumatology currently on prednisone taper), prior mandibular CA with reconstruction, upper extremity neuropathy who presented to the ER with neck pain.  Patient states she was walking up her stairs getting along tight jacket when she lost her footing and fell backwards down approximately 5 steps.  Patient reports pain to the lower neck 89-year-old female.  Patient denies LOC, headache, dizziness, vision changes, numbness, shortness of breath, palpitation, diffuse lower abdominal pain, n/v/d or any other concerns.    Seen by Neurosurgery in ER-Small anterior lower sacral fx. Denies BLE numbness/tingling. Intact on exam.   -No neurosurgical intervention   -No further imaging needed    (26 Nov 2023 11:01)      INTERIM EVENTS:Patient seen at bedside ,interim events noted.      PMH -reviewed admission note, no change since admission  HEART FAILURE: Acute[ ]Chronic[ ] Systolic[ ] Diastolic[ ] Combined Systolic and Diastolic[ ]  CAD[ ] CABG[ ] PCI[ ]  DEVICES[ ] PPM[ ] ICD[ ] ILR[ ]  ATRIAL FIBRILLATION[ ] Paroxysmal[ ] Permanent[ ] CHADS2-[  ]  AKSHAT[ ] CKD1[ ] CKD2[ ] CKD3[ ] CKD4[ ] ESRD[ ]  COPD[ ] HTN[ ]   DM[ ] Type1[ ] Type 2[ ]   CVA[ ] Paresis[ ]    AMBULATION: Assisted[ ] Cane/walker[ ] Independent[ ]    MEDICATIONS  (STANDING):  atorvastatin 40 milliGRAM(s) Oral at bedtime  clopidogrel Tablet 75 milliGRAM(s) Oral daily  enoxaparin Injectable 40 milliGRAM(s) SubCutaneous every 24 hours  levothyroxine 125 MICROGram(s) Oral daily  losartan 100 milliGRAM(s) Oral daily  predniSONE   Tablet 4 milliGRAM(s) Oral <User Schedule>  predniSONE   Tablet 5 milliGRAM(s) Oral <User Schedule>  pregabalin 200 milliGRAM(s) Oral at bedtime    MEDICATIONS  (PRN):  acetaminophen     Tablet .. 650 milliGRAM(s) Oral every 6 hours PRN Temp greater or equal to 38C (100.4F), Mild Pain (1 - 3)  oxyCODONE    IR 2.5 milliGRAM(s) Oral every 6 hours PRN Moderate Pain (4 - 6)  oxyCODONE    IR 5 milliGRAM(s) Oral every 6 hours PRN Severe Pain (7 - 10)            REVIEW OF SYSTEMS:  Constitutional: [ ] fever, [ ]weight loss,  [ ]fatigue [ ]weight gain  Eyes: [ ] visual changes  Respiratory: [ ]shortness of breath;  [ ] cough, [ ]wheezing, [ ]chills, [ ]hemoptysis  Cardiovascular: [ ] chest pain, [ ]palpitations, [ ]dizziness,  [ ]leg swelling[ ]orthopnea[ ]PND  Gastrointestinal: [ ] abdominal pain, [ ]nausea, [ ]vomiting,  [ ]diarrhea [ ]Constipation [ ]Melena  Genitourinary: [ ] dysuria, [ ] hematuria [ ]Montaño  Neurologic: [ ] headaches [ ] tremors[ ]weakness [ ]Paralysis Right[ ] Left[ ]  Skin: [ ] itching, [ ]burning, [ ] rashes  Endocrine: [ ] heat or cold intolerance  Musculoskeletal: [ ] joint pain or swelling; [ ] muscle, back, or extremity pain  Psychiatric: [ ] depression, [ ]anxiety, [ ]mood swings, or [ ]difficulty sleeping  Hematologic: [ ] easy bruising, [ ] bleeding gums    [ ] All remaining systems negative except as per above.   [ ]Unable to obtain.  [x] No change in ROS since admission      Vital Signs Last 24 Hrs  T(C): 36.3 (29 Nov 2023 17:01), Max: 37 (29 Nov 2023 10:22)  T(F): 97.3 (29 Nov 2023 17:01), Max: 98.6 (29 Nov 2023 10:22)  HR: 79 (29 Nov 2023 17:01) (75 - 80)  BP: 128/52 (29 Nov 2023 17:01) (126/66 - 145/83)  BP(mean): --  RR: 18 (29 Nov 2023 17:01) (18 - 19)  SpO2: 98% (29 Nov 2023 17:01) (97% - 100%)    Parameters below as of 29 Nov 2023 17:01  Patient On (Oxygen Delivery Method): room air      I&O's Summary    28 Nov 2023 07:01  -  29 Nov 2023 07:00  --------------------------------------------------------  IN: 120 mL / OUT: 1200 mL / NET: -1080 mL    29 Nov 2023 07:01  -  29 Nov 2023 20:39  --------------------------------------------------------  IN: 0 mL / OUT: 800 mL / NET: -800 mL        PHYSICAL EXAM:  General: No acute distress BMI-  HEENT: EOMI, PERRL  Neck: Supple, [ ] JVD  Lungs: Equal air entry bilaterally; [ ] rales [ ] wheezing [ ] rhonchi  Heart: Regular rate and rhythm; [x ] murmur   2/6 [ x] systolic [ ] diastolic [ ] radiation[ ] rubs [ ]  gallops  Abdomen: Nontender, bowel sounds present  Extremities: No clubbing, cyanosis, [ ] edema [ ]Pulses  equal and intact  Nervous system:  Alert & Oriented X3, no focal deficits  Psychiatric: Normal affect  Skin: No rashes or lesions    LABS:        Creatinine Trend: 0.82<--, 0.80<--, 0.83<--              
PATIENT SEEN AND EXAMINED BY ISELA LOPEZ M.D. ON :- 11/26/23  DATE OF SERVICE: 11/26/23            Interim events noted,Labs ,Radiological studies and Cardiology tests reviewed .    MR#[id number]  PATIENT NAME:-[facility] COURSE: HPI:      INTERIM EVENTS:Patient seen at bedside ,interim events noted.      PMH -reviewed admission note, no change since admission  HEART FAILURE: Acute[ ]Chronic[ ] Systolic[ ] Diastolic[ ] Combined Systolic and Diastolic[ ]  CAD[ ] CABG[ ] PCI[ ]  DEVICES[ ] PPM[ ] ICD[ ] ILR[ ]  ATRIAL FIBRILLATION[ ] Paroxysmal[ ] Permanent[ ] CHADS2-[  ]  AKSHAT[ ] CKD1[ ] CKD2[ ] CKD3[ ] CKD4[ ] ESRD[ ]  COPD[ ] HTN[ ]   DM[ ] Type1[ ] Type 2[ ]   CVA[ ] Paresis[ ]    AMBULATION: Assisted[ ] Cane/walker[ ] Independent[ ]    MEDICATIONS  (STANDING):  atorvastatin 40 milliGRAM(s) Oral at bedtime  clopidogrel Tablet 75 milliGRAM(s) Oral daily  levothyroxine 125 MICROGram(s) Oral daily  losartan 100 milliGRAM(s) Oral daily  predniSONE   Tablet 4 milliGRAM(s) Oral <User Schedule>  predniSONE   Tablet 5 milliGRAM(s) Oral <User Schedule>  pregabalin 200 milliGRAM(s) Oral at bedtime    MEDICATIONS  (PRN):  acetaminophen     Tablet .. 650 milliGRAM(s) Oral every 6 hours PRN Temp greater or equal to 38C (100.4F), Mild Pain (1 - 3)  oxyCODONE    IR 2.5 milliGRAM(s) Oral every 6 hours PRN Moderate Pain (4 - 6)  oxyCODONE    IR 5 milliGRAM(s) Oral every 6 hours PRN Severe Pain (7 - 10)            REVIEW OF SYSTEMS:  Constitutional: [ ] fever, [ ]weight loss,  [ ]fatigue [ ]weight gain  Eyes: [ ] visual changes  Respiratory: [ ]shortness of breath;  [ ] cough, [ ]wheezing, [ ]chills, [ ]hemoptysis  Cardiovascular: [ ] chest pain, [ ]palpitations, [ ]dizziness,  [ ]leg swelling[ ]orthopnea[ ]PND  Gastrointestinal: [ ] abdominal pain, [ ]nausea, [ ]vomiting,  [ ]diarrhea [ ]Constipation [ ]Melena  Genitourinary: [ ] dysuria, [ ] hematuria [ ]Montaño  Neurologic: [ ] headaches [ ] tremors[ ]weakness [ ]Paralysis Right[ ] Left[ ]  Skin: [ ] itching, [ ]burning, [ ] rashes  Endocrine: [ ] heat or cold intolerance  Musculoskeletal: [ ] joint pain or swelling; [ ] muscle, back, or extremity pain  Psychiatric: [ ] depression, [ ]anxiety, [ ]mood swings, or [ ]difficulty sleeping  Hematologic: [ ] easy bruising, [ ] bleeding gums    [ ] All remaining systems negative except as per above.   [ ]Unable to obtain.  [x] No change in ROS since admission      Vital Signs Last 24 Hrs  T(C): 37.1 (26 Nov 2023 17:47), Max: 37.1 (26 Nov 2023 17:47)  T(F): 98.8 (26 Nov 2023 17:47), Max: 98.8 (26 Nov 2023 17:47)  HR: 83 (26 Nov 2023 17:47) (65 - 95)  BP: 108/44 (26 Nov 2023 17:47) (108/44 - 166/79)  BP(mean): --  RR: 17 (26 Nov 2023 17:47) (17 - 20)  SpO2: 99% (26 Nov 2023 17:47) (96% - 100%)    Parameters below as of 26 Nov 2023 17:47  Patient On (Oxygen Delivery Method): room air      I&O's Summary      PHYSICAL EXAM:  General: No acute distress BMI-  HEENT: EOMI, PERRL  Neck: Supple, [ ] JVD  Lungs: Equal air entry bilaterally; [ ] rales [ ] wheezing [ ] rhonchi  Heart: Regular rate and rhythm; [x ] murmur   2/6 [ x] systolic [ ] diastolic [ ] radiation[ ] rubs [ ]  gallops  Abdomen: Nontender, bowel sounds present  Extremities: No clubbing, cyanosis, [ ] edema [ ]Pulses  equal and intact  Nervous system:  Alert & Oriented X3, no focal deficits  Psychiatric: Normal affect  Skin: No rashes or lesions    LABS:  11-26    142  |  107  |  17  ----------------------------<  98  3.8   |  24  |  0.80    Ca    8.3<L>      26 Nov 2023 09:48    TPro  5.8<L>  /  Alb  3.5  /  TBili  0.4  /  DBili  x   /  AST  18  /  ALT  16  /  AlkPhos  60  11-26    Creatinine Trend: 0.80<--, 0.83<--                        9.6    6.99  )-----------( 186      ( 26 Nov 2023 09:48 )             31.3     PT/INR - ( 25 Nov 2023 17:59 )   PT: 10.9 sec;   INR: 0.97 ratio         PTT - ( 25 Nov 2023 17:59 )  PTT:28.2 sec          
MR#[id number]  PATIENT NAME:-[facility] COURSE: HPI:  · HPI Objective Statement: 83-year-old female with past medical history of carotid stenosis on Plavix, HTN, HLD, osteoarthritis (does follow with rheumatology currently on prednisone taper), prior mandibular CA with reconstruction, upper extremity neuropathy who presented to the ER with neck pain.  Patient states she was walking up her stairs getting along tight jacket when she lost her footing and fell backwards down approximately 5 steps.  Patient reports pain to the lower neck 89-year-old female.  Patient denies LOC, headache, dizziness, vision changes, numbness, shortness of breath, palpitation, diffuse lower abdominal pain, n/v/d or any other concerns.    Seen by Neurosurgery in ER-Small anterior lower sacral fx. Denies BLE numbness/tingling. Intact on exam.   -No neurosurgical intervention   -No further imaging needed    (26 Nov 2023 11:01)      INTERIM EVENTS:Patient seen at bedside ,interim events noted.      PMH -reviewed admission note, no change since admission  HEART FAILURE: Acute[ ]Chronic[ ] Systolic[ ] Diastolic[ ] Combined Systolic and Diastolic[ ]  CAD[ ] CABG[ ] PCI[ ]  DEVICES[ ] PPM[ ] ICD[ ] ILR[ ]  ATRIAL FIBRILLATION[ ] Paroxysmal[ ] Permanent[ ] CHADS2-[  ]  AKSHAT[ ] CKD1[ ] CKD2[ ] CKD3[ ] CKD4[ ] ESRD[ ]  COPD[ ] HTN[ ]   DM[ ] Type1[ ] Type 2[ ]   CVA[ ] Paresis[ ]    AMBULATION: Assisted[ ] Cane/walker[ ] Independent[ ]    MEDICATIONS  (STANDING):  atorvastatin 40 milliGRAM(s) Oral at bedtime  clopidogrel Tablet 75 milliGRAM(s) Oral daily  enoxaparin Injectable 40 milliGRAM(s) SubCutaneous every 24 hours  levothyroxine 125 MICROGram(s) Oral daily  losartan 100 milliGRAM(s) Oral daily  predniSONE   Tablet 4 milliGRAM(s) Oral <User Schedule>  predniSONE   Tablet 5 milliGRAM(s) Oral <User Schedule>  pregabalin 200 milliGRAM(s) Oral at bedtime    MEDICATIONS  (PRN):  acetaminophen     Tablet .. 650 milliGRAM(s) Oral every 6 hours PRN Temp greater or equal to 38C (100.4F), Mild Pain (1 - 3)  oxyCODONE    IR 2.5 milliGRAM(s) Oral every 6 hours PRN Moderate Pain (4 - 6)  oxyCODONE    IR 5 milliGRAM(s) Oral every 6 hours PRN Severe Pain (7 - 10)            REVIEW OF SYSTEMS:  Constitutional: [ ] fever, [ ]weight loss,  [ ]fatigue [ ]weight gain  Eyes: [ ] visual changes  Respiratory: [ ]shortness of breath;  [ ] cough, [ ]wheezing, [ ]chills, [ ]hemoptysis  Cardiovascular: [ ] chest pain, [ ]palpitations, [ ]dizziness,  [ ]leg swelling[ ]orthopnea[ ]PND  Gastrointestinal: [ ] abdominal pain, [ ]nausea, [ ]vomiting,  [ ]diarrhea [ ]Constipation [ ]Melena  Genitourinary: [ ] dysuria, [ ] hematuria [ ]Montaño  Neurologic: [ ] headaches [ ] tremors[ ]weakness [ ]Paralysis Right[ ] Left[ ]  Skin: [ ] itching, [ ]burning, [ ] rashes  Endocrine: [ ] heat or cold intolerance  Musculoskeletal: [ ] joint pain or swelling; [ ] muscle, back, or extremity pain  Psychiatric: [ ] depression, [ ]anxiety, [ ]mood swings, or [ ]difficulty sleeping  Hematologic: [ ] easy bruising, [ ] bleeding gums    [ ] All remaining systems negative except as per above.   [ ]Unable to obtain.  [x] No change in ROS since admission      Vital Signs Last 24 Hrs  T(C): 37 (28 Nov 2023 17:52), Max: 37 (28 Nov 2023 17:52)  T(F): 98.6 (28 Nov 2023 17:52), Max: 98.6 (28 Nov 2023 17:52)  HR: 61 (28 Nov 2023 17:52) (61 - 81)  BP: 134/64 (28 Nov 2023 17:52) (118/47 - 142/66)  BP(mean): --  RR: 18 (28 Nov 2023 17:52) (18 - 18)  SpO2: 98% (28 Nov 2023 17:52) (95% - 100%)    Parameters below as of 28 Nov 2023 17:52  Patient On (Oxygen Delivery Method): room air      I&O's Summary    27 Nov 2023 07:01  -  28 Nov 2023 07:00  --------------------------------------------------------  IN: 480 mL / OUT: 400 mL / NET: 80 mL    28 Nov 2023 07:01  -  28 Nov 2023 20:02  --------------------------------------------------------  IN: 0 mL / OUT: 300 mL / NET: -300 mL        PHYSICAL EXAM:  General: No acute distress BMI-  HEENT: EOMI, PERRL  Neck: Supple, [ ] JVD  Lungs: Equal air entry bilaterally; [ ] rales [ ] wheezing [ ] rhonchi  Heart: Regular rate and rhythm; [x ] murmur   2/6 [ x] systolic [ ] diastolic [ ] radiation[ ] rubs [ ]  gallops  Abdomen: Nontender, bowel sounds present  Extremities: No clubbing, cyanosis, [ ] edema [ ]Pulses  equal and intact  Nervous system:  Alert & Oriented X3, no focal deficits  Psychiatric: Normal affect  Skin: No rashes or lesions    LABS:  11-27    138  |  105  |  14  ----------------------------<  109<H>  4.0   |  23  |  0.82    Ca    7.8<L>      27 Nov 2023 04:05  Phos  2.9     11-27  Mg     2.00     11-27      Creatinine Trend: 0.82<--, 0.80<--, 0.83<--                        9.2    7.39  )-----------( 192      ( 27 Nov 2023 04:05 )             31.2               
MR#[id number]  PATIENT NAME:-ABDON MARTINEZ    DATE OF SERVICE: 11-27-23   Patient was seen and examined by Anthony Orellana MD on    11-27-23   Interim events noted.Consultant notes ,Labs,Telemetry reviewed by me       HOSPITAL COURSE: HPI:  · HPI Objective Statement: 83-year-old female with past medical history of carotid stenosis on Plavix, HTN, HLD, osteoarthritis (does follow with rheumatology currently on prednisone taper), prior mandibular CA with reconstruction, upper extremity neuropathy who presented to the ER with neck pain.  Patient states she was walking up her stairs getting along tight jacket when she lost her footing and fell backwards down approximately 5 steps.  Patient reports pain to the lower neck 89-year-old female.  Patient denies LOC, headache, dizziness, vision changes, numbness, shortness of breath, palpitation, diffuse lower abdominal pain, n/v/d or any other concerns.    Seen by Neurosurgery in ER-Small anterior lower sacral fx. Denies BLE numbness/tingling. Intact on exam.   -No neurosurgical intervention   -No further imaging needed    (26 Nov 2023 11:01)      INTERIM EVENTS:Patient seen at bedside ,interim events noted.      PMH -reviewed admission note, no change since admission  HEART FAILURE: Acute[ ]Chronic[ ] Systolic[ ] Diastolic[ ] Combined Systolic and Diastolic[ ]  CAD[ ] CABG[ ] PCI[ ]  DEVICES[ ] PPM[ ] ICD[ ] ILR[ ]  ATRIAL FIBRILLATION[ ] Paroxysmal[ ] Permanent[ ] CHADS2-[  ]  AKSHAT[ ] CKD1[ ] CKD2[ ] CKD3[ ] CKD4[ ] ESRD[ ]  COPD[ ] HTN[ ]   DM[ ] Type1[ ] Type 2[ ]   CVA[ ] Paresis[ ]    AMBULATION: Assisted[ ] Cane/walker[ ] Independent[ ]    MEDICATIONS  (STANDING):  atorvastatin 40 milliGRAM(s) Oral at bedtime  clopidogrel Tablet 75 milliGRAM(s) Oral daily  enoxaparin Injectable 40 milliGRAM(s) SubCutaneous every 24 hours  levothyroxine 125 MICROGram(s) Oral daily  losartan 100 milliGRAM(s) Oral daily  predniSONE   Tablet 4 milliGRAM(s) Oral <User Schedule>  predniSONE   Tablet 5 milliGRAM(s) Oral <User Schedule>  pregabalin 200 milliGRAM(s) Oral at bedtime    MEDICATIONS  (PRN):  acetaminophen     Tablet .. 650 milliGRAM(s) Oral every 6 hours PRN Temp greater or equal to 38C (100.4F), Mild Pain (1 - 3)  oxyCODONE    IR 2.5 milliGRAM(s) Oral every 6 hours PRN Moderate Pain (4 - 6)  oxyCODONE    IR 5 milliGRAM(s) Oral every 6 hours PRN Severe Pain (7 - 10)            REVIEW OF SYSTEMS:  Constitutional: [ ] fever, [ ]weight loss,  [ ]fatigue [ ]weight gain  Eyes: [ ] visual changes  Respiratory: [ ]shortness of breath;  [ ] cough, [ ]wheezing, [ ]chills, [ ]hemoptysis  Cardiovascular: [ ] chest pain, [ ]palpitations, [ ]dizziness,  [ ]leg swelling[ ]orthopnea[ ]PND  Gastrointestinal: [ ] abdominal pain, [ ]nausea, [ ]vomiting,  [ ]diarrhea [ ]Constipation [ ]Melena  Genitourinary: [ ] dysuria, [ ] hematuria [ ]Montaño  Neurologic: [ ] headaches [ ] tremors[ ]weakness [ ]Paralysis Right[ ] Left[ ]  Skin: [ ] itching, [ ]burning, [ ] rashes  Endocrine: [ ] heat or cold intolerance  Musculoskeletal: [ ] joint pain or swelling; [ ] muscle, back, or extremity pain  Psychiatric: [ ] depression, [ ]anxiety, [ ]mood swings, or [ ]difficulty sleeping  Hematologic: [ ] easy bruising, [ ] bleeding gums    [ ] All remaining systems negative except as per above.   [ ]Unable to obtain.  [x] No change in ROS since admission      Vital Signs Last 24 Hrs  T(C): 37.2 (27 Nov 2023 17:27), Max: 37.2 (27 Nov 2023 17:27)  T(F): 99 (27 Nov 2023 17:27), Max: 99 (27 Nov 2023 17:27)  HR: 81 (27 Nov 2023 17:27) (78 - 89)  BP: 118/56 (27 Nov 2023 17:27) (101/51 - 118/56)  BP(mean): --  RR: 19 (27 Nov 2023 17:27) (18 - 19)  SpO2: 95% (27 Nov 2023 17:27) (95% - 100%)    Parameters below as of 27 Nov 2023 17:27  Patient On (Oxygen Delivery Method): room air      I&O's Summary    26 Nov 2023 07:01  -  27 Nov 2023 07:00  --------------------------------------------------------  IN: 950 mL / OUT: 501 mL / NET: 449 mL        PHYSICAL EXAM:  General: No acute distress BMI-  HEENT: EOMI, PERRL  Neck: Supple, [ ] JVD  Lungs: Equal air entry bilaterally; [ ] rales [ ] wheezing [ ] rhonchi  Heart: Regular rate and rhythm; [x ] murmur   2/6 [ x] systolic [ ] diastolic [ ] radiation[ ] rubs [ ]  gallops  Abdomen: Nontender, bowel sounds present  Extremities: No clubbing, cyanosis, [ ] edema [ ]Pulses  equal and intact  Nervous system:  Alert & Oriented X3, no focal deficits  Psychiatric: Normal affect  Skin: No rashes or lesions    LABS:  11-27    138  |  105  |  14  ----------------------------<  109<H>  4.0   |  23  |  0.82    Ca    7.8<L>      27 Nov 2023 04:05  Phos  2.9     11-27  Mg     2.00     11-27    TPro  5.8<L>  /  Alb  3.5  /  TBili  0.4  /  DBili  x   /  AST  18  /  ALT  16  /  AlkPhos  60  11-26    Creatinine Trend: 0.82<--, 0.80<--, 0.83<--                        9.2    7.39  )-----------( 192      ( 27 Nov 2023 04:05 )             31.2           < from: CT Chest w/ IV Cont (11.25.23 @ 19:37) >    ACC: [id number] EXAM:  CT THORACIC SPINE   ORDERED BY: LYNN JEFFERS     ACC: [id number] EXAM:  CT ABDOMEN AND PELVIS IC   ORDERED BY: LYNN JEFFERS     ACC: [id number] EXAM:  CT CHEST IC   ORDERED BY: LYNN JEFFERS     PROCEDURE DATE:  11/25/2023          INTERPRETATION:  CLINICAL INFORMATION: Trauma, fell down stairs, back   pain and ecchymosis in the left lateral chest wall and flank area history   of oral cancer and radiation therapy    COMPARISON: None.    CONTRAST/COMPLICATIONS:  IV Contrast: Omnipaque 350    90 cc administered   10 cc discarded  Oral Contrast: NONE  Complications: None reported at time of study completion    PROCEDURE:  CT of the Chest, Abdomen and Pelvis was performed.  Imaging was performed through the chest in the arterial phase followed by   imaging of the abdomen and pelvis in the portal venous phase.  Sagittal and coronal reformats were performed.  Reformatted images of the thoracic spine created and multiplanar series   submitted.    FINDINGS:  CHEST:  LUNGS AND LARGE AIRWAYS: No pulmonary contusion or laceration. Minimal   atelectasis in the lingula.  PLEURA: No effusion or pneumothorax  VESSELS: No traumatic aortic injury or aneurysm. Scattered   atherosclerotic plaque within the abdominal aorta and coronary arteries.   Main pulmonary artery is normal in caliber. Pulmonary arteries are   opacified to the segmental level without acute thromboembolus.  HEART: Mild cardiomegaly. No pericardial effusion.  MEDIASTINUM AND NEDA: No hemorrhage or adenopathy  CHEST WALL AND LOWER NECK: The left subscapularis muscle appears   thickened and edematous, with edema in the subscapular recess.    ABDOMEN AND PELVIS:  LIVER: Mildly enlarged  BILE DUCTS: Normal caliber.  GALLBLADDER: No inflammatory change cholelithiasis. Calcification along   the superior margin of the gallbladder is favored to be intramural or   external to the gallbladder.  SPLEEN: Mildly enlarged  PANCREAS: Within normal limits.  ADRENALS: Within normal limits.  KIDNEYS/URETERS: 7.4 cm cyst in the mid pole right kidney    BLADDER: Within normal limits.  REPRODUCTIVE ORGANS:    BOWEL: No bowel obstruction. Normal appendix.  PERITONEUM: Prominent presacral edema.  VESSELS: Atherosclerotic changes.  RETROPERITONEUM/LYMPH NODES: No lymphadenopathy.  ABDOMINAL WALL: Within normal limits.  BONES: Severe degenerative changes of the bilateral shoulders, with   multiple osteochondral loose bodies in the right subscapular recess.   Multilevel degenerative changes throughout the spine, with grade 1   anterolisthesis at L4-5. No evidence of rib fracture. Degenerative   changes in the right hip with multiple osteochondral loose bodies and   small effusion. Cortical irregularity along the anterior margin of the   inferior sacrum, for example on axial series 304, image 266 and sagittal   series 604, image 89, suspicious for small fracture.    Thoracic spine reformats:    There are 12 rib-bearing thoracic vertebra. Normal thoracic kyphosis is   maintained. Vertebral body heights are maintained. Posterior elements are   intact and articular facet joints are normally aligned. No prevertebral   edema.    Multilevel degenerative changes without evidence of significant central   stenosis. Paraspinal soft tissues are unremarkable.    IMPRESSION:    1. Suspect a small buckle fracture in the inferior sacrum.  2. No acute visceral injury in the chest, abdomen and pelvis.  3. No acute fracture or traumatic subluxation in the thoracic spine.        --- End of Report ---            YANET HA MD; Attending Radiologist  This document has been electronically signed. Nov 25 2023  8:14PM    < end of copied text >

## 2023-11-29 NOTE — PROGRESS NOTE ADULT - ASSESSMENT
83-year-old female with past medical history of carotid stenosis on Plavix, HTN, HLD, osteoarthritis (does follow with rheumatology currently on prednisone taper), prior mandibular CA with reconstruction, upper extremity neuropathy who presented to the ER with neck pain.      # mechanical FALL  - Suspect a small buckle fracture in the inferior sacrum.  -No acute cervical spine injury  -cont Pain management f/u   - Rec outpatient follow up Dr. Yevgeniy Porter 3-4 weeks    # Recent RA flare  *Pt on prednisone taper 5mg AM - 4mg PM x 4 weeks then reduce to 4mg and 4mg- Outpt Pharmacy did not know specifics of taper, only have current Rx as noted      # PT rehab  d/c planning [facility] 
83-year-old female with past medical history of carotid stenosis on Plavix, HTN, HLD, osteoarthritis (does follow with rheumatology currently on prednisone taper), prior mandibular CA with reconstruction, upper extremity neuropathy who presented to the ER with neck pain.      # mechanical FALL  - Suspect a small buckle fracture in the inferior sacrum.  -No acute cervical spine injury  -cont Pain management f/u   - Rec outpatient follow up Dr. Yevgeniy Porter 3-4 weeks    # Recent RA flare  *Pt on prednisone taper 5mg AM - 4mg PM x 4 weeks then reduce to 4mg and 4mg- Outpt Pharmacy did not know specifics of taper, only have current Rx as noted      # PT rehab  d/c planning [facility] 
83-year-old female with past medical history of carotid stenosis on Plavix, HTN, HLD, osteoarthritis (does follow with rheumatology currently on prednisone taper), prior mandibular CA with reconstruction, upper extremity neuropathy who presented to the ER with neck pain.      # mechanical FALL  - Suspect a small buckle fracture in the inferior sacrum.  -No acute cervical spine injury  -cont Pain management f/u   - Rec outpatient follow up Dr. Yevgeniy Porter 3-4 weeks    # Recent RA flare  *Pt on prednisone taper 5mg AM - 4mg PM x 4 weeks then reduce to 4mg and 4mg- Outpt Pharmacy did not know specifics of taper, only have current Rx as noted      # PT rehab  d/c planning to [facility]   advance diet as tolerated  

## 2023-11-29 NOTE — PROGRESS NOTE ADULT - TIME BILLING
- Review of records, telemetry, vital signs and daily labs.   - General and cardiovascular physical examination.  - Generation of cardiovascular treatment plan.  - Coordination of care.      Patient was seen and examined by me on 11/29/23,interim events noted,labs and radiology studies reviewed.  Anthony Orellana MD,FACC.  [address] [place], [place] [id number].  [phone]
- Review of records, telemetry, vital signs and daily labs.   - General and cardiovascular physical examination.  - Generation of cardiovascular treatment plan.  - Coordination of care.      Patient was seen and examined by me on 11/27/23,interim events noted,labs and radiology studies reviewed.  Anthony Orellana MD,FACC.  [address] [place], [place] [id number].  [phone]
- Review of records, telemetry, vital signs and daily labs.   - General and cardiovascular physical examination.  - Generation of cardiovascular treatment plan.  - Coordination of care.      Patient was seen and examined by me on 11/28/23,interim events noted,labs and radiology studies reviewed.  Anthony Orellana MD,FACC.  [address] [place], [place] [id number].  [phone]

## 2023-11-30 ENCOUNTER — TRANSCRIPTION ENCOUNTER (OUTPATIENT)
Age: 83
End: 2023-11-30

## 2023-11-30 VITALS
RESPIRATION RATE: 18 BRPM | TEMPERATURE: 98 F | HEART RATE: 93 BPM | OXYGEN SATURATION: 98 % | DIASTOLIC BLOOD PRESSURE: 75 MMHG | SYSTOLIC BLOOD PRESSURE: 175 MMHG

## 2023-11-30 DIAGNOSIS — I10 ESSENTIAL (PRIMARY) HYPERTENSION: ICD-10-CM

## 2023-11-30 RX ORDER — OXYCODONE HYDROCHLORIDE 5 MG/1
1 TABLET ORAL
Qty: 40 | Refills: 0
Start: 2023-11-30 | End: 2023-12-09

## 2023-11-30 RX ORDER — ACETAMINOPHEN 500 MG
2 TABLET ORAL
Qty: 0 | Refills: 0 | DISCHARGE
Start: 2023-11-30

## 2023-11-30 RX ORDER — OXYCODONE HYDROCHLORIDE 5 MG/1
5 TABLET ORAL EVERY 6 HOURS
Refills: 0 | Status: DISCONTINUED | OUTPATIENT
Start: 2023-11-30 | End: 2023-11-30

## 2023-11-30 RX ORDER — OXYCODONE HYDROCHLORIDE 5 MG/1
2.5 TABLET ORAL EVERY 6 HOURS
Refills: 0 | Status: DISCONTINUED | OUTPATIENT
Start: 2023-11-30 | End: 2023-11-30

## 2023-11-30 RX ORDER — AMLODIPINE BESYLATE 2.5 MG/1
5 TABLET ORAL ONCE
Refills: 0 | Status: COMPLETED | OUTPATIENT
Start: 2023-11-30 | End: 2023-11-30

## 2023-11-30 RX ADMIN — ENOXAPARIN SODIUM 40 MILLIGRAM(S): 100 INJECTION SUBCUTANEOUS at 17:51

## 2023-11-30 RX ADMIN — LOSARTAN POTASSIUM 100 MILLIGRAM(S): 100 TABLET, FILM COATED ORAL at 05:41

## 2023-11-30 RX ADMIN — Medication 4 MILLIGRAM(S): at 17:00

## 2023-11-30 RX ADMIN — Medication 125 MICROGRAM(S): at 05:42

## 2023-11-30 RX ADMIN — AMLODIPINE BESYLATE 5 MILLIGRAM(S): 2.5 TABLET ORAL at 17:51

## 2023-11-30 RX ADMIN — Medication 5 MILLIGRAM(S): at 05:41

## 2023-11-30 RX ADMIN — CLOPIDOGREL BISULFATE 75 MILLIGRAM(S): 75 TABLET, FILM COATED ORAL at 10:51

## 2023-11-30 NOTE — SWALLOW BEDSIDE ASSESSMENT ADULT - SWALLOW EVAL: DIAGNOSIS
1- Mild oral stage for regular solid and thin liquids marked by adequate oral containment, adequate bolus manipulation, slow mastication likely exacerbated by incomplete dentition, adequate anterior to posterior transfer, mild oral residue which clears with liquid wash. 2- Functional pharyngeal stage for regular solids and thin liquids marked by initiation of the pharyngeal swallow with hyolaryngeal excursion upon palpation without evidence of impaired airway protection.

## 2023-11-30 NOTE — DISCHARGE NOTE PROVIDER - NSDCMRMEDTOKEN_GEN_ALL_CORE_FT
clopidogrel 75 mg oral tablet: 1 tab(s) orally  losartan 100 mg oral tablet: 1 tab(s) orally  predniSONE 1 mg oral tablet: 4 milligram(s) orally once a day in PM  predniSONE 5 mg oral tablet: 1 tab(s) orally once a day in AM  pregabalin 200 mg oral capsule: 1 cap(s) orally once a day (at bedtime)  simvastatin 40 mg oral tablet: 1 tab(s) orally  Synthroid 125 mcg (0.125 mg) oral tablet: 1 tab(s) orally once a day in am   acetaminophen 325 mg oral tablet: 2 tab(s) orally every 6 hours As needed Temp greater or equal to 38C (100.4F), Mild Pain (1 - 3)  clopidogrel 75 mg oral tablet: 1 tab(s) orally  losartan 100 mg oral tablet: 1 tab(s) orally  predniSONE 1 mg oral tablet: 4 tab(s) orally once a day everyday at 6 pm taper as per RA  predniSONE 5 mg oral tablet: 1 tab(s) orally once a day every day at 6 am, taper as per RA  pregabalin 200 mg oral capsule: 1 cap(s) orally once a day (at bedtime)  simvastatin 40 mg oral tablet: 1 tab(s) orally  Synthroid 125 mcg (0.125 mg) oral tablet: 1 tab(s) orally once a day in am

## 2023-11-30 NOTE — SWALLOW BEDSIDE ASSESSMENT ADULT - COMMENTS
Progress Note- Internal Medicine 11/28: "83-year-old female with past medical history of carotid stenosis on Plavix, HTN, HLD, osteoarthritis (does follow with rheumatology currently on prednisone taper), prior mandibular CA with reconstruction, upper extremity neuropathy who presented to the ER with neck pain."     Patient seen awake/alert during clinical swallow evaluation this AM. Patient denies difficulty swallowing and states she eats regular food at home such as chicken, beef, filet mignon when tender. Patient states, "I'm very safe when I eat. I take small bites and take my time."

## 2023-11-30 NOTE — DISCHARGE NOTE PROVIDER - HOSPITAL COURSE
· HPI Objective Statement: 83-year-old female with past medical history of carotid stenosis on Plavix, HTN, HLD, osteoarthritis (does follow with rheumatology currently on prednisone taper), prior mandibular CA with reconstruction, upper extremity neuropathy who presented to the ER with neck pain.  Patient states she was walking up her stairs getting along tight jacket when she lost her footing and fell backwards down approximately 5 steps.  Patient reports pain to the lower neck 89-year-old female.  Patient denies LOC, headache, dizziness, vision changes, numbness, shortness of breath, palpitation, diffuse lower abdominal pain, n/v/d or any other concerns.    Seen by Neurosurgery in ER-Small anterior lower sacral fx. Denies BLE numbness/tingling. Intact on exam.   -No neurosurgical intervention   -No further imaging needed    · HPI Objective Statement: 83-year-old female with past medical history of carotid stenosis on Plavix, HTN, HLD, osteoarthritis (does follow with rheumatology currently on prednisone taper), prior mandibular CA with reconstruction, upper extremity neuropathy who presented to the ER with neck pain.  Patient states she was walking up her stairs getting along tight jacket when she lost her footing and fell backwards down approximately 5 steps.  Patient reports pain to the lower neck 89-year-old female.  Patient denies LOC, headache, dizziness, vision changes, numbness, shortness of breath, palpitation, diffuse lower abdominal pain, n/v/d or any other concerns.    Seen by Neurosurgery in ER-Small anterior lower sacral fx. Denies BLE numbness/tingling. Intact on exam.   -No neurosurgical intervention   -No further imaging needed       # mechanical FALL  - Suspect a small buckle fracture in the inferior sacrum.  -No acute cervical spine injury  -cont Pain management f/u   - Rec outpatient follow up Dr. Yevgeniy Porter 3-4 weeks    # Recent RA flare  *Pt on prednisone taper 5mg AM - 4mg PM x 4 weeks then reduce to 4mg and 4mg- Outpt Pharmacy did not know specifics of taper, only have current Rx as noted      # PT rehab  d/c planning to [facility]   advance diet as tolerated      On 11/30/2023  this case was reviewed with Dr. Orellana, the patient is medically stable and optimized for discharge. All medications were reviewed and prescriptions were sent to mutually agreed upon pharmacy.          Seen by Neurosurgery in ER-Small anterior lower sacral fx. Denies BLE numbness/tingling. Intact on exam.   -No neurosurgical intervention   -No further imaging needed     cont Pain management f/u   -Rec outpatient follow up Dr. Yevgeniy Porter 3-4 weeks

## 2023-11-30 NOTE — DISCHARGE NOTE PROVIDER - NSDCCPCAREPLAN_GEN_ALL_CORE_FT
PRINCIPAL DISCHARGE DIAGNOSIS  Diagnosis: Fall  Assessment and Plan of Treatment: After fall you broke your sacrum Suspect a small buckle fracture in the inferior sacrum.  Follow-up with your primary care physician.  When walking: ensure proper footwear, adequate lighting ,avoid loose rugs and clutter in walkway.   Ensure adequate rest ,nutrition, and hydration. Follow up with surgery- you have already appointment made.        SECONDARY DISCHARGE DIAGNOSES  Diagnosis: Hypertension  Assessment and Plan of Treatment: Eat Low salt diet  Activity as tolerated.  Take all medication as prescribed.  Follow up with your medical doctor for routine blood pressure monitoring at your next visit.  Notify your doctor if you have any of the following symptoms:   Dizziness, Lightheadedness, Blurry vision, Headache, Chest pain, Shortness of breath. Follow up with your cardiology and primary doctor and call fo appointment       Diagnosis: Rheumatoid arthritis  Assessment and Plan of Treatment: Take your prednisone as directed and followmup with your [facility] doctor.

## 2023-11-30 NOTE — DISCHARGE NOTE PROVIDER - NSDCHHENCOUNTER_GEN_ALL_CORE
Patient is in the supine position.   The body was positioned using the following devices: safety strap.  The head was positioned using the following devices: regular pillow.  The left arm was positioned using the following devices: arm board.  The right arm was positioned using the following devices: arm board.  The left leg was positioned using the following devices: safety strap.  The right leg was positioned using the following devic es: safety strap.
8
30-Nov-2023

## 2023-11-30 NOTE — PROVIDER CONTACT NOTE (OTHER) - ACTION/TREATMENT ORDERED:
as per provider, " He (dr espinoza) said she can go. He is the director at the rehab she is going to. He said to give her amlodipine 5 now and he will take care of her there."
Monitor in am around 4am, 5am

## 2023-11-30 NOTE — DISCHARGE NOTE PROVIDER - CARE PROVIDER_API CALL
Anthony Orellana  Cardiology  [address] [place] [address]  Phone: [phone]  Fax: [phone]  Follow Up Time: 1 week

## 2023-11-30 NOTE — DISCHARGE NOTE PROVIDER - NSDCFUADDAPPT_GEN_ALL_CORE_FT
Follow up with cardiology and primary doctor - call for appointment   cont Pain management f/u   -Rec outpatient follow up Dr. Yevgeniy Porter 3-4 weeks- call for appointment   You have appointment with surgery on 1/9/2024

## 2023-11-30 NOTE — PROVIDER CONTACT NOTE (OTHER) - BACKGROUND
82 y/o f admited for fracture of sacrum from fall. Hx of carotid stenosis, malignant neoplasm of tongue and madible, HTN, HLD, osteoarthritis.
83y admitted for fracture of sacrum

## 2023-11-30 NOTE — DISCHARGE NOTE PROVIDER - NSDCFUSCHEDAPPT_GEN_ALL_CORE_FT
[facility]  [address]  Scheduled Appointment: 01/09/2024

## 2023-11-30 NOTE — DISCHARGE NOTE NURSING/CASE MANAGEMENT/SOCIAL WORK - PATIENT PORTAL LINK FT
You can access the FollowMyHealth Patient Portal offered by [facility] by registering at the following website: [email or web address]. By joining [employer]’s FollowMyHealth portal, you will also be able to view your health information using other applications (apps) compatible with our system.

## 2023-11-30 NOTE — PROVIDER CONTACT NOTE (OTHER) - ASSESSMENT
/75. all other VS stable, afebrile. No c/o pain at this time. Patient denies beiong anxious at this time. PAtient also denies headache, SOB, dizziness, chest pain, and blury vision. No signs of acute distress at this time
Patient AOX4,  observed with trending low diastolic blood pressure 21;00 and 01:30am but in no distress

## 2023-11-30 NOTE — PROVIDER CONTACT NOTE (OTHER) - SITUATION
Patient observed with trending low diastolic blood pressure 21;00 and 01:30am
/75. Patient is asymptomatic. BP at 16:50 was 159/74. Transport is here to take patient to rehab.

## 2023-11-30 NOTE — SWALLOW BEDSIDE ASSESSMENT ADULT - SWALLOW EVAL: RECOMMENDED FEEDING/EATING TECHNIQUES
Swallowing Guidelines: Seated Upright during Mealtimes; Slow Pacing; Take Small Bites and Chew Solids Well; Allow for Swallow Prior to Next Presentation; Maintain Oral Hygiene

## 2024-01-09 ENCOUNTER — APPOINTMENT (OUTPATIENT)
Dept: SURGERY | Facility: CLINIC | Age: 84
End: 2024-01-09

## 2024-02-01 ENCOUNTER — APPOINTMENT (OUTPATIENT)
Dept: SURGERY | Facility: CLINIC | Age: 84
End: 2024-02-01
Payer: MEDICARE

## 2024-02-01 ENCOUNTER — APPOINTMENT (OUTPATIENT)
Dept: SURGERY | Facility: CLINIC | Age: 84
End: 2024-02-01

## 2024-02-01 DIAGNOSIS — E03.9 HYPOTHYROIDISM, UNSPECIFIED: ICD-10-CM

## 2024-02-01 DIAGNOSIS — C03.9 MALIGNANT NEOPLASM OF GUM, UNSPECIFIED: ICD-10-CM

## 2024-02-01 DIAGNOSIS — C02.9 MALIGNANT NEOPLASM OF TONGUE, UNSPECIFIED: ICD-10-CM

## 2024-02-01 PROCEDURE — 99214 OFFICE O/P EST MOD 30 MIN: CPT | Mod: 25

## 2024-02-01 PROCEDURE — 36415 COLL VENOUS BLD VENIPUNCTURE: CPT

## 2024-02-01 NOTE — PHYSICAL EXAM
[de-identified] : changes from prior surgery.   no palpable lesions.   [de-identified] : no palpable thyroid nodules [Laryngoscopy Performed] : laryngoscopy was performed, see procedure section for findings [Midline] : located in midline position [de-identified] : no evidence of recurrent disease. no new lesions noted [de-identified] : changes from prior surgery.  white changes right lower gingiva with no substance - no change since last visit.  1 mm area exposed bone left floor of mouth.  bone segment mobile.  loose left incisor fragment removed [Normal] : orientation to person, place, and time: normal

## 2024-02-01 NOTE — HISTORY OF PRESENT ILLNESS
[de-identified] : 71   months  s/p partial glossectomy. denies pain, bleeding, dysphagia, hoarseness or new lesions.   no changes medically since last visit .   last CT stable. continues Synthroid 125 mcg daily.  carotid atherosclerosis being followed by PCP, treated with plavix. no recent mandible infections..  I have reviewed all old and new data and available images.  Additional information was obtained from others present at the time of visit to ensure the completeness of the history.

## 2024-02-01 NOTE — ASSESSMENT
[FreeTextEntry1] :  will observe. is not a surgical candidate due to comorbidities. patient has been given the opportunity to ask questions, and all of the patient's questions have been answered to their satisfaction.  to return earlier if any change. bloods drawn. to call next week for results.

## 2024-02-02 LAB
T3 SERPL-MCNC: 82 NG/DL
T4 FREE SERPL-MCNC: 1.9 NG/DL
TSH SERPL-ACNC: 0.7 UIU/ML

## 2024-02-05 ENCOUNTER — NON-APPOINTMENT (OUTPATIENT)
Age: 84
End: 2024-02-05

## 2024-02-05 LAB
THYROGLOB AB SERPL-ACNC: <20 IU/ML
THYROPEROXIDASE AB SERPL IA-ACNC: <10 IU/ML

## 2024-06-19 ENCOUNTER — APPOINTMENT (OUTPATIENT)
Dept: NEUROLOGY | Facility: CLINIC | Age: 84
End: 2024-06-19

## 2024-08-01 ENCOUNTER — APPOINTMENT (OUTPATIENT)
Dept: SURGERY | Facility: CLINIC | Age: 84
End: 2024-08-01
Payer: MEDICARE

## 2024-08-01 DIAGNOSIS — C03.9 MALIGNANT NEOPLASM OF GUM, UNSPECIFIED: ICD-10-CM

## 2024-08-01 PROCEDURE — 99214 OFFICE O/P EST MOD 30 MIN: CPT

## 2024-08-01 NOTE — HISTORY OF PRESENT ILLNESS
[de-identified] : 78   months  s/p partial glossectomy. denies pain, bleeding, dysphagia, hoarseness or new lesions.   no changes medically since last visit .   last CT stable. continues Synthroid 125 mcg daily.  carotid atherosclerosis being followed by PCP, treated with plavix. no recent mandible infections.  I have reviewed all old and new data and available images.  Additional information was obtained from others present at the time of visit to ensure the completeness of the history.

## 2024-08-01 NOTE — PHYSICAL EXAM
[de-identified] : changes from prior surgery.   no palpable lesions.   [de-identified] : no palpable thyroid nodules [Laryngoscopy Performed] : laryngoscopy was performed, see procedure section for findings [Midline] : located in midline position [de-identified] : no evidence of recurrent disease. no new lesions noted [de-identified] : changes from prior surgery.  white changes right lower gingiva with no substance - no change since last visit.  1 mm area exposed bone left floor of mouth.  bone segment mobile.  loose left incisor fragment removed [Normal] : orientation to person, place, and time: normal [FreeTextEntry2] : 1 cm flat pigmented lesion right cheek

## 2024-08-01 NOTE — ASSESSMENT
[FreeTextEntry1] :  will observe.  patient has been given the opportunity to ask questions, and all of the patient's questions have been answered to their satisfaction.  to return earlier if any change. bloods next visit. to see dermatologist regarding cheek skin lesion

## 2024-08-27 ENCOUNTER — TRANSCRIPTION ENCOUNTER (OUTPATIENT)
Age: 84
End: 2024-08-27

## 2024-08-28 ENCOUNTER — TRANSCRIPTION ENCOUNTER (OUTPATIENT)
Age: 84
End: 2024-08-28

## 2024-08-28 ENCOUNTER — INPATIENT (INPATIENT)
Facility: HOSPITAL | Age: 84
LOS: 3 days | Discharge: NURSING, REHAB OR LONG-TERM CARE | End: 2024-09-01
Attending: STUDENT IN AN ORGANIZED HEALTH CARE EDUCATION/TRAINING PROGRAM | Admitting: STUDENT IN AN ORGANIZED HEALTH CARE EDUCATION/TRAINING PROGRAM
Payer: MEDICARE

## 2024-08-28 VITALS
RESPIRATION RATE: 18 BRPM | OXYGEN SATURATION: 98 % | WEIGHT: 162.04 LBS | DIASTOLIC BLOOD PRESSURE: 62 MMHG | HEART RATE: 70 BPM | TEMPERATURE: 98 F | SYSTOLIC BLOOD PRESSURE: 170 MMHG

## 2024-08-28 DIAGNOSIS — T84.7XXA INFECTION AND INFLAMMATORY REACTION DUE TO OTHER INTERNAL ORTHOPEDIC PROSTHETIC DEVICES, IMPLANTS AND GRAFTS, INITIAL ENCOUNTER: Chronic | ICD-10-CM

## 2024-08-28 DIAGNOSIS — Z98.89 OTHER SPECIFIED POSTPROCEDURAL STATES: Chronic | ICD-10-CM

## 2024-08-28 DIAGNOSIS — Z86.69 PERSONAL HISTORY OF OTHER DISEASES OF THE NERVOUS SYSTEM AND SENSE ORGANS: Chronic | ICD-10-CM

## 2024-08-28 DIAGNOSIS — S72.009A FRACTURE OF UNSPECIFIED PART OF NECK OF UNSPECIFIED FEMUR, INITIAL ENCOUNTER FOR CLOSED FRACTURE: ICD-10-CM

## 2024-08-28 DIAGNOSIS — Z98.890 OTHER SPECIFIED POSTPROCEDURAL STATES: Chronic | ICD-10-CM

## 2024-08-28 LAB
24R-OH-CALCIDIOL SERPL-MCNC: 28.2 NG/ML — LOW (ref 30–80)
ALBUMIN SERPL ELPH-MCNC: 3.7 G/DL — SIGNIFICANT CHANGE UP (ref 3.3–5)
ALP SERPL-CCNC: 49 U/L — SIGNIFICANT CHANGE UP (ref 40–120)
ALT FLD-CCNC: 17 U/L — SIGNIFICANT CHANGE UP (ref 4–33)
ANION GAP SERPL CALC-SCNC: 11 MMOL/L — SIGNIFICANT CHANGE UP (ref 7–14)
ANION GAP SERPL CALC-SCNC: 14 MMOL/L — SIGNIFICANT CHANGE UP (ref 7–14)
APTT BLD: 27.8 SEC — SIGNIFICANT CHANGE UP (ref 24.5–35.6)
AST SERPL-CCNC: 20 U/L — SIGNIFICANT CHANGE UP (ref 4–32)
BASOPHILS # BLD AUTO: 0.03 K/UL — SIGNIFICANT CHANGE UP (ref 0–0.2)
BASOPHILS NFR BLD AUTO: 0.3 % — SIGNIFICANT CHANGE UP (ref 0–2)
BILIRUB SERPL-MCNC: 0.3 MG/DL — SIGNIFICANT CHANGE UP (ref 0.2–1.2)
BLD GP AB SCN SERPL QL: NEGATIVE — SIGNIFICANT CHANGE UP
BLD GP AB SCN SERPL QL: NEGATIVE — SIGNIFICANT CHANGE UP
BUN SERPL-MCNC: 14 MG/DL — SIGNIFICANT CHANGE UP (ref 7–23)
BUN SERPL-MCNC: 21 MG/DL — SIGNIFICANT CHANGE UP (ref 7–23)
CALCIUM SERPL-MCNC: 7.6 MG/DL — LOW (ref 8.4–10.5)
CALCIUM SERPL-MCNC: 8.4 MG/DL — SIGNIFICANT CHANGE UP (ref 8.4–10.5)
CHLORIDE SERPL-SCNC: 108 MMOL/L — HIGH (ref 98–107)
CHLORIDE SERPL-SCNC: 110 MMOL/L — HIGH (ref 98–107)
CO2 SERPL-SCNC: 20 MMOL/L — LOW (ref 22–31)
CO2 SERPL-SCNC: 21 MMOL/L — LOW (ref 22–31)
CREAT SERPL-MCNC: 0.78 MG/DL — SIGNIFICANT CHANGE UP (ref 0.5–1.3)
CREAT SERPL-MCNC: 0.81 MG/DL — SIGNIFICANT CHANGE UP (ref 0.5–1.3)
EGFR: 72 ML/MIN/1.73M2 — SIGNIFICANT CHANGE UP
EGFR: 75 ML/MIN/1.73M2 — SIGNIFICANT CHANGE UP
EOSINOPHIL # BLD AUTO: 0.16 K/UL — SIGNIFICANT CHANGE UP (ref 0–0.5)
EOSINOPHIL NFR BLD AUTO: 1.5 % — SIGNIFICANT CHANGE UP (ref 0–6)
GLUCOSE SERPL-MCNC: 111 MG/DL — HIGH (ref 70–99)
GLUCOSE SERPL-MCNC: 96 MG/DL — SIGNIFICANT CHANGE UP (ref 70–99)
HCT VFR BLD CALC: 30.6 % — LOW (ref 34.5–45)
HCT VFR BLD CALC: 35 % — SIGNIFICANT CHANGE UP (ref 34.5–45)
HGB BLD-MCNC: 10.5 G/DL — LOW (ref 11.5–15.5)
HGB BLD-MCNC: 9.2 G/DL — LOW (ref 11.5–15.5)
IANC: 8.4 K/UL — HIGH (ref 1.8–7.4)
IMM GRANULOCYTES NFR BLD AUTO: 0.5 % — SIGNIFICANT CHANGE UP (ref 0–0.9)
INR BLD: 0.98 RATIO — SIGNIFICANT CHANGE UP (ref 0.85–1.18)
LYMPHOCYTES # BLD AUTO: 1.29 K/UL — SIGNIFICANT CHANGE UP (ref 1–3.3)
LYMPHOCYTES # BLD AUTO: 12.2 % — LOW (ref 13–44)
MCHC RBC-ENTMCNC: 24.7 PG — LOW (ref 27–34)
MCHC RBC-ENTMCNC: 24.7 PG — LOW (ref 27–34)
MCHC RBC-ENTMCNC: 30 GM/DL — LOW (ref 32–36)
MCHC RBC-ENTMCNC: 30.1 GM/DL — LOW (ref 32–36)
MCV RBC AUTO: 82 FL — SIGNIFICANT CHANGE UP (ref 80–100)
MCV RBC AUTO: 82.4 FL — SIGNIFICANT CHANGE UP (ref 80–100)
MONOCYTES # BLD AUTO: 0.63 K/UL — SIGNIFICANT CHANGE UP (ref 0–0.9)
MONOCYTES NFR BLD AUTO: 6 % — SIGNIFICANT CHANGE UP (ref 2–14)
NEUTROPHILS # BLD AUTO: 8.4 K/UL — HIGH (ref 1.8–7.4)
NEUTROPHILS NFR BLD AUTO: 79.5 % — HIGH (ref 43–77)
NRBC # BLD: 0 /100 WBCS — SIGNIFICANT CHANGE UP (ref 0–0)
NRBC # BLD: 0 /100 WBCS — SIGNIFICANT CHANGE UP (ref 0–0)
NRBC # FLD: 0 K/UL — SIGNIFICANT CHANGE UP (ref 0–0)
NRBC # FLD: 0 K/UL — SIGNIFICANT CHANGE UP (ref 0–0)
PLATELET # BLD AUTO: 199 K/UL — SIGNIFICANT CHANGE UP (ref 150–400)
PLATELET # BLD AUTO: 249 K/UL — SIGNIFICANT CHANGE UP (ref 150–400)
POTASSIUM SERPL-MCNC: 3.9 MMOL/L — SIGNIFICANT CHANGE UP (ref 3.5–5.3)
POTASSIUM SERPL-MCNC: 3.9 MMOL/L — SIGNIFICANT CHANGE UP (ref 3.5–5.3)
POTASSIUM SERPL-SCNC: 3.9 MMOL/L — SIGNIFICANT CHANGE UP (ref 3.5–5.3)
POTASSIUM SERPL-SCNC: 3.9 MMOL/L — SIGNIFICANT CHANGE UP (ref 3.5–5.3)
PROT SERPL-MCNC: 6 G/DL — SIGNIFICANT CHANGE UP (ref 6–8.3)
PROTHROM AB SERPL-ACNC: 11 SEC — SIGNIFICANT CHANGE UP (ref 9.5–13)
RBC # BLD: 3.73 M/UL — LOW (ref 3.8–5.2)
RBC # BLD: 4.25 M/UL — SIGNIFICANT CHANGE UP (ref 3.8–5.2)
RBC # FLD: 16.1 % — HIGH (ref 10.3–14.5)
RBC # FLD: 16.4 % — HIGH (ref 10.3–14.5)
RH IG SCN BLD-IMP: POSITIVE — SIGNIFICANT CHANGE UP
RH IG SCN BLD-IMP: POSITIVE — SIGNIFICANT CHANGE UP
SODIUM SERPL-SCNC: 141 MMOL/L — SIGNIFICANT CHANGE UP (ref 135–145)
SODIUM SERPL-SCNC: 143 MMOL/L — SIGNIFICANT CHANGE UP (ref 135–145)
WBC # BLD: 10.56 K/UL — HIGH (ref 3.8–10.5)
WBC # BLD: 8.67 K/UL — SIGNIFICANT CHANGE UP (ref 3.8–10.5)
WBC # FLD AUTO: 10.56 K/UL — HIGH (ref 3.8–10.5)
WBC # FLD AUTO: 8.67 K/UL — SIGNIFICANT CHANGE UP (ref 3.8–10.5)

## 2024-08-28 PROCEDURE — 99285 EMERGENCY DEPT VISIT HI MDM: CPT | Mod: FS

## 2024-08-28 PROCEDURE — 99222 1ST HOSP IP/OBS MODERATE 55: CPT | Mod: FS,57

## 2024-08-28 PROCEDURE — 27245 TREAT THIGH FRACTURE: CPT | Mod: LT

## 2024-08-28 PROCEDURE — 71045 X-RAY EXAM CHEST 1 VIEW: CPT | Mod: 26

## 2024-08-28 PROCEDURE — 73090 X-RAY EXAM OF FOREARM: CPT | Mod: 26,LT

## 2024-08-28 PROCEDURE — 25600 CLTX DST RDL FX/EPHYS SEP WO: CPT | Mod: LT

## 2024-08-28 PROCEDURE — 73080 X-RAY EXAM OF ELBOW: CPT | Mod: 26,LT

## 2024-08-28 PROCEDURE — 73110 X-RAY EXAM OF WRIST: CPT | Mod: 26,LT,76

## 2024-08-28 PROCEDURE — 73030 X-RAY EXAM OF SHOULDER: CPT | Mod: 26,LT

## 2024-08-28 PROCEDURE — 73060 X-RAY EXAM OF HUMERUS: CPT | Mod: 26,LT

## 2024-08-28 PROCEDURE — 73552 X-RAY EXAM OF FEMUR 2/>: CPT | Mod: 26,LT

## 2024-08-28 PROCEDURE — 73502 X-RAY EXAM HIP UNI 2-3 VIEWS: CPT | Mod: 26,LT

## 2024-08-28 PROCEDURE — 73562 X-RAY EXAM OF KNEE 3: CPT | Mod: 26,LT

## 2024-08-28 DEVICE — PIN ORTHO PRECISION TAPER 3.9X450MM: Type: IMPLANTABLE DEVICE | Status: FUNCTIONAL

## 2024-08-28 DEVICE — SCREW LOKG 5X32.5MM: Type: IMPLANTABLE DEVICE | Status: FUNCTIONAL

## 2024-08-28 DEVICE — SCREW GAMMA LAG 10.5X90MM STRL: Type: IMPLANTABLE DEVICE | Status: FUNCTIONAL

## 2024-08-28 DEVICE — IMPLANTABLE DEVICE: Type: IMPLANTABLE DEVICE | Status: FUNCTIONAL

## 2024-08-28 DEVICE — K-WIRE STRYKER 3.2MM X 450MM: Type: IMPLANTABLE DEVICE | Status: FUNCTIONAL

## 2024-08-28 RX ORDER — LEVOTHYROXINE SODIUM 100 MCG
125 TABLET ORAL DAILY
Refills: 0 | Status: DISCONTINUED | OUTPATIENT
Start: 2024-08-28 | End: 2024-08-28

## 2024-08-28 RX ORDER — HYDROMORPHONE HYDROCHLORIDE 2 MG/1
0.4 TABLET ORAL
Refills: 0 | Status: DISCONTINUED | OUTPATIENT
Start: 2024-08-28 | End: 2024-08-28

## 2024-08-28 RX ORDER — OXYCODONE HYDROCHLORIDE 5 MG/1
5 TABLET ORAL EVERY 4 HOURS
Refills: 0 | Status: DISCONTINUED | OUTPATIENT
Start: 2024-08-28 | End: 2024-08-29

## 2024-08-28 RX ORDER — LEVOTHYROXINE SODIUM 100 MCG
150 TABLET ORAL DAILY
Refills: 0 | Status: DISCONTINUED | OUTPATIENT
Start: 2024-08-28 | End: 2024-09-01

## 2024-08-28 RX ORDER — POVIDONE-IODINE 10 %
1 SOLUTION, NON-ORAL TOPICAL ONCE
Refills: 0 | Status: DISCONTINUED | OUTPATIENT
Start: 2024-08-28 | End: 2024-09-01

## 2024-08-28 RX ORDER — PREDNISONE 10 MG
3 TABLET, DOSE PACK ORAL AT BEDTIME
Refills: 0 | Status: DISCONTINUED | OUTPATIENT
Start: 2024-08-28 | End: 2024-09-01

## 2024-08-28 RX ORDER — ENOXAPARIN SODIUM 100 MG/ML
40 INJECTION SUBCUTANEOUS EVERY 24 HOURS
Refills: 0 | Status: DISCONTINUED | OUTPATIENT
Start: 2024-08-28 | End: 2024-09-01

## 2024-08-28 RX ORDER — LOSARTAN POTASSIUM 50 MG/1
100 TABLET ORAL DAILY
Refills: 0 | Status: DISCONTINUED | OUTPATIENT
Start: 2024-08-28 | End: 2024-09-01

## 2024-08-28 RX ORDER — PREDNISONE 10 MG
3 TABLET, DOSE PACK ORAL ONCE
Refills: 0 | Status: COMPLETED | OUTPATIENT
Start: 2024-08-28 | End: 2024-08-28

## 2024-08-28 RX ORDER — CEFAZOLIN SODIUM 2 G/100ML
2000 INJECTION, SOLUTION INTRAVENOUS EVERY 8 HOURS
Refills: 0 | Status: COMPLETED | OUTPATIENT
Start: 2024-08-28 | End: 2024-08-29

## 2024-08-28 RX ORDER — PREDNISONE 10 MG
4 TABLET, DOSE PACK ORAL DAILY
Refills: 0 | Status: DISCONTINUED | OUTPATIENT
Start: 2024-08-29 | End: 2024-09-01

## 2024-08-28 RX ORDER — OXYCODONE HYDROCHLORIDE 5 MG/1
2.5 TABLET ORAL EVERY 4 HOURS
Refills: 0 | Status: DISCONTINUED | OUTPATIENT
Start: 2024-08-28 | End: 2024-08-29

## 2024-08-28 RX ORDER — FENTANYL CITRATE 50 UG/ML
25 INJECTION INTRAMUSCULAR; INTRAVENOUS
Refills: 0 | Status: DISCONTINUED | OUTPATIENT
Start: 2024-08-28 | End: 2024-08-28

## 2024-08-28 RX ORDER — ACETAMINOPHEN 325 MG/1
1000 TABLET ORAL ONCE
Refills: 0 | Status: COMPLETED | OUTPATIENT
Start: 2024-08-28 | End: 2024-08-28

## 2024-08-28 RX ORDER — PREGABALIN 75 MG/1
200 CAPSULE ORAL AT BEDTIME
Refills: 0 | Status: DISCONTINUED | OUTPATIENT
Start: 2024-08-28 | End: 2024-08-30

## 2024-08-28 RX ORDER — SODIUM CHLORIDE 9 MG/ML
1000 INJECTION INTRAMUSCULAR; INTRAVENOUS; SUBCUTANEOUS
Refills: 0 | Status: DISCONTINUED | OUTPATIENT
Start: 2024-08-28 | End: 2024-08-30

## 2024-08-28 RX ORDER — CHLORHEXIDINE GLUCONATE 40 MG/ML
1 SOLUTION TOPICAL
Refills: 0 | Status: DISCONTINUED | OUTPATIENT
Start: 2024-08-28 | End: 2024-08-30

## 2024-08-28 RX ADMIN — OXYCODONE HYDROCHLORIDE 5 MILLIGRAM(S): 5 TABLET ORAL at 22:19

## 2024-08-28 RX ADMIN — ENOXAPARIN SODIUM 40 MILLIGRAM(S): 100 INJECTION SUBCUTANEOUS at 18:57

## 2024-08-28 RX ADMIN — HYDROMORPHONE HYDROCHLORIDE 0.4 MILLIGRAM(S): 2 TABLET ORAL at 15:00

## 2024-08-28 RX ADMIN — HYDROMORPHONE HYDROCHLORIDE 0.4 MILLIGRAM(S): 2 TABLET ORAL at 14:18

## 2024-08-28 RX ADMIN — HYDROMORPHONE HYDROCHLORIDE 0.4 MILLIGRAM(S): 2 TABLET ORAL at 14:33

## 2024-08-28 RX ADMIN — Medication 3 MILLIGRAM(S): at 22:53

## 2024-08-28 RX ADMIN — SODIUM CHLORIDE 125 MILLILITER(S): 9 INJECTION INTRAMUSCULAR; INTRAVENOUS; SUBCUTANEOUS at 09:54

## 2024-08-28 RX ADMIN — HYDROMORPHONE HYDROCHLORIDE 0.4 MILLIGRAM(S): 2 TABLET ORAL at 14:45

## 2024-08-28 RX ADMIN — OXYCODONE HYDROCHLORIDE 5 MILLIGRAM(S): 5 TABLET ORAL at 23:19

## 2024-08-28 RX ADMIN — CEFAZOLIN SODIUM 100 MILLIGRAM(S): 2 INJECTION, SOLUTION INTRAVENOUS at 18:57

## 2024-08-28 RX ADMIN — Medication 4 MILLIGRAM(S): at 05:36

## 2024-08-28 RX ADMIN — OXYCODONE HYDROCHLORIDE 5 MILLIGRAM(S): 5 TABLET ORAL at 10:45

## 2024-08-28 RX ADMIN — CHLORHEXIDINE GLUCONATE 1 APPLICATION(S): 40 SOLUTION TOPICAL at 11:15

## 2024-08-28 RX ADMIN — SODIUM CHLORIDE 125 MILLILITER(S): 9 INJECTION INTRAMUSCULAR; INTRAVENOUS; SUBCUTANEOUS at 14:18

## 2024-08-28 RX ADMIN — Medication 3 MILLIGRAM(S): at 06:37

## 2024-08-28 RX ADMIN — ACETAMINOPHEN 400 MILLIGRAM(S): 325 TABLET ORAL at 04:18

## 2024-08-28 NOTE — ED PROVIDER NOTE - MUSCULOSKELETAL, MLM
LLE externally rotated with tenderness over L hip. L wrist TTP with edema. No further bony tenderness. No c-spine tenderness.

## 2024-08-28 NOTE — DISCHARGE NOTE PROVIDER - NSDCCPCAREPLAN_GEN_ALL_CORE_FT
PRINCIPAL DISCHARGE DIAGNOSIS  Diagnosis: Hip fracture  Assessment and Plan of Treatment: Diet: Continue regular diet upon discharge.   Activity: No heavy lifting > 25 lbs for 4 weeks. Avoid straining or excessive activity x 6 weeks.   -Continue to use your walker when ambulating until your postoperative follow up appointment.   Dressings: Keep dressing clean, dry, and intact. Your doctor will remove your bandage at your post-operative follow up appointment.   Please do not remove the splint on your left arm until follow up with Dr. Miller. Do not get the splint wet, it must remain dry at ALL TIMES. If it gets wet, please call your surgeon's office. You can cover it with a garbage bag, or specialized 'cast covers'.  Other Care:   -You may shower when you get home but DO NOT soak dressing and/or incision. The water may run over your dressing/incision but DO NOT let the water directly hit your dressing/incision (take a shower with your wound away from the direct stream of water). NO hot tubes, NO bath tubs, NO swimming pools.   -Elevate your operative leg 2 feet above heart level for 2 hours in the morning, 2 hours in the afternoon, and 2 hours in the evening.   -Apply ice for 20min every time you elevate.   -Sit for 90 min/day: 45mins x2 or 30min x3  -DO NOT sit for more than the 90min/day. Walk or lay down when not elevating your leg.   -DO NOT place the elevation pillow behind your knees. Only place it under your calf and heel.   -DO NOT bend more than 45 degrees at the waist      SECONDARY DISCHARGE DIAGNOSES  Diagnosis: Wrist fracture  Assessment and Plan of Treatment:

## 2024-08-28 NOTE — ED PROVIDER NOTE - OBJECTIVE STATEMENT
83-year-old female with history of carotid stenosis on Plavix, hypertension, hyperlipidemia, osteoarthritis presents to emergency department after mechanical fall at home.  Patient states she tripped over her feet falling onto the left side.  Patient states she hit her left shoulder wrist and left hip.  Patient reports pain to left wrist and left hip.  Patient not able to ambulate.  Patient denies head injury or LOC.  No headache neck pain vision change.

## 2024-08-28 NOTE — DISCHARGE NOTE PROVIDER - NSDCMRMEDTOKEN_GEN_ALL_CORE_FT
acetaminophen 325 mg oral tablet: 2 tab(s) orally every 6 hours As needed Temp greater or equal to 38C (100.4F), Mild Pain (1 - 3)  clopidogrel 75 mg oral tablet: 1 tab(s) orally  losartan 100 mg oral tablet: 1 tab(s) orally  predniSONE 1 mg oral tablet: 4 tab(s) orally once a day everyday at 6 pm taper as per RA  predniSONE 5 mg oral tablet: 1 tab(s) orally once a day every day at 6 am, taper as per RA  pregabalin 200 mg oral capsule: 1 cap(s) orally once a day (at bedtime)  simvastatin 40 mg oral tablet: 1 tab(s) orally  Synthroid 125 mcg (0.125 mg) oral tablet: 1 tab(s) orally once a day in am   acetaminophen 325 mg oral tablet: 3 tab(s) orally every 8 hours  cholecalciferol oral tablet: 1000 unit(s) orally once a day  cyclobenzaprine 10 mg oral tablet: 1 tab(s) orally 3 times a day As needed Muscle Spasm  enoxaparin: 40 milligram(s) subcutaneous once a day  losartan 100 mg oral tablet: 1 tab(s) orally once a day  ondansetron 4 mg oral tablet: 1 tab(s) orally every 6 hours As needed Nausea and/or Vomiting  oxyCODONE 5 mg oral tablet: 1 tab(s) orally every 4 hours As needed Moderate Pain (4 - 6)  predniSONE 1 mg oral tablet: 4 tab(s) orally once a day everyday at 6 pm taper as per RA  predniSONE 5 mg oral tablet: 1 tab(s) orally once a day every day at 6 am, taper as per RA  pregabalin 200 mg oral capsule: 1 cap(s) orally once a day (at bedtime)  simvastatin 40 mg oral tablet: 1 tab(s) orally  Synthroid 125 mcg (0.125 mg) oral tablet: 1 tab(s) orally once a day in am   acetaminophen 325 mg oral tablet: 3 tab(s) orally every 8 hours  cholecalciferol oral tablet: 1000 unit(s) orally once a day  cyclobenzaprine 10 mg oral tablet: 1 tab(s) orally 3 times a day As needed Muscle Spasm  enoxaparin: 40 milligram(s) subcutaneous once a day  losartan 100 mg oral tablet: 1 tab(s) orally once a day  Narcan 4 mg/0.1 mL nasal spray: 1 spray(s) intranasally once as needed for overdose symptoms, respiratory depression  ondansetron 4 mg oral tablet: 1 tab(s) orally every 6 hours As needed Nausea and/or Vomiting  oxyCODONE 5 mg oral tablet: 1 tab(s) orally every 4 hours As needed Moderate Pain (4 - 6)  Plavix 75 mg oral tablet: 1 tab(s) orally once a day  predniSONE 1 mg oral tablet: 4 tab(s) orally once a day everyday at 6 pm taper as per RA  predniSONE 5 mg oral tablet: 1 tab(s) orally once a day every day at 6 am, taper as per RA  pregabalin 200 mg oral capsule: 1 cap(s) orally once a day (at bedtime)  simvastatin 40 mg oral tablet: 1 tab(s) orally  Synthroid 125 mcg (0.125 mg) oral tablet: 1 tab(s) orally once a day in am  traMADol 50 mg oral tablet: 1 tab(s) orally every 6 hours As needed Mild Pain (1 - 3)

## 2024-08-28 NOTE — DISCHARGE NOTE PROVIDER - HOSPITAL COURSE
This is a 84yo Female with PMH of polymyalgia rheumatica, jaw Ca s/p resection + XRT, carotid stenosis, HLD who presents to [facility] for orthopedic surgery. Patient s/p L IMN with Dr. Miller on 8/28/24. Patient tolerated the procedure well without any intraoperative complications. Patient tolerated physical therapy well, and the pain was controlled. Patient is weight bearing as tolerated with cane/walker as needed. Seen by medical attending for continuity of care and management and cleared for safe discharge. Keep dressing/incision clean, dry and intact. Any suture/staples to be removed on post-op day #14 your office visit. Patient should also remain in left arm splint until follow up. Patient is on ******** mg of ***   for DVT prophylaxis, please take for 6 weeks unless otherwise instructed by your surgeon. Please follow up with Dr. Miller in 2 weeks, call the office to make an appointment, [phone]. Please follow up with your PMD for continuity of care and management as medications may have changed. This is a 84yo Female with PMH of polymyalgia rheumatica, jaw Ca s/p resection + XRT, carotid stenosis, HLD who presents to [facility] for orthopedic surgery. Patient s/p L IMN with Dr. Miller on 8/28/24. Patient tolerated the procedure well without any intraoperative complications. Patient tolerated physical therapy well, and the pain was controlled. Patient is weight bearing as tolerated with cane/walker as needed. Seen by medical attending for continuity of care and management and cleared for safe discharge. Keep dressing/incision clean, dry and intact. Any suture/staples to be removed on post-op day #14 your office visit. Patient should also remain in left arm splint until follow up. Patient is on 40 mg of Lovenox  for DVT prophylaxis, please take for 6 weeks unless otherwise instructed by your surgeon. Please follow up with Dr. Miller in 2 weeks, call the office to make an appointment, [phone]. Please follow up with your PMD for continuity of care and management as medications may have changed.

## 2024-08-28 NOTE — ED ADULT NURSE REASSESSMENT NOTE - NS ED NURSE REASSESS COMMENT FT1
report received from overnight DALE Aguilera. A&Ox4. NAD. pt denies SOB, chest pain, dizziness, weakness, urinary symptoms, HA, n/v/d, fevers, chills. respirations are even and un labored. medicated as ordered. report given to OR DALE Mackey. safety precautions maintained.

## 2024-08-28 NOTE — ED PROVIDER NOTE - CLINICAL SUMMARY MEDICAL DECISION MAKING FREE TEXT BOX
83-year-old female with history of carotid stenosis on Plavix, hypertension, hyperlipidemia, osteoarthritis presents to emergency department after mechanical fall at home.  Patient states she tripped over her feet falling onto the left side.  Patient states she hit her left shoulder wrist and left hip.  Patient reports pain to left wrist and left hip.  Patient not able to ambulate.  Patient denies head injury or LOC.  No headache neck pain vision change.    plan  - labs  - xr LLE, LUE  - pain control

## 2024-08-28 NOTE — ED ADULT NURSE NOTE - NSFALLUNIVINTERV_ED_ALL_ED
Bed/Stretcher in lowest position, wheels locked, appropriate side rails in place/Call bell, personal items and telephone in reach/Instruct patient to call for assistance before getting out of bed/chair/stretcher/Non-slip footwear applied when patient is off stretcher/[place] to call system/Physically safe environment - no spills, clutter or unnecessary equipment/Purposeful proactive rounding/Room/bathroom lighting operational, light cord in reach

## 2024-08-28 NOTE — CONSULT NOTE ADULT - SUBJECTIVE AND OBJECTIVE BOX
MR:- [id number] :  NAME:-ABDON MARTINEZ:-    DATE OF SERVICE:08-28-24 @ 07:17    Patient was seen,examined and evaluated  by Anthony Orellana MD [id number] @ 07:17 .  ER evaluation, Labs and Hospital course was reviewed,    CHIEF COMPLAINT:Fall    HPI: 83-year-old female with past medical history of carotid stenosis on Plavix, HTN, HLD, osteoarthritis (does follow with rheumatology currently on prednisone taper), prior mandibular CA with reconstruction, upper extremity neuropathy who presented to the ER  after mechanical fall at home.Patient states she tripped over her feet falling onto the left side.  Patient states she hit her left shoulder wrist and left hip.Patient denies head injury or LOC.  No headache neck pain vision change.Patient not able to ambulate.   Noted Acute LEFT  nondisplaced transverse intertrochanteric fracture.      CARDIAC HISTORY:  [ ] CAD [ [PCI [ ] CABG [ ] Prior Cath  [ ] Atrial Fibrillation  Devices[ ] PPM [ ] ICD [ ]ILR  Heart Failure [ ] HFrEF [ ] HFpEF    PAST MEDICAL & SURGICAL HISTORY:  Hypothyroid      Hyperlipidemia      Oral Cancer      Basal Cell Cancer  biopsy of nose  5/2010      Malignant neoplasm of mandible  left- 2008      Malignant neoplasm of tongue      Radiation therapy complication  burn to left neck area      History of radiation therapy  2008      Radiation dermatitis  left neck      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      Hardware complicating wound infection  S/P removal of hardware 6/2008      S/P biopsy  tongue lesion 7/2014      H/O right breast biopsy  1980's      History of cataract  left and right ; 6/2017, 12/2017      Home Medications:   * Patient Currently Takes Medications as of 30-Nov-2023 17:49 documented in Structured Notes  · 	predniSONE 1 mg oral tablet: 4 tab(s) orally once a day everyday at 6 pm taper as per RA  · 	predniSONE 5 mg oral tablet: 1 tab(s) orally once a day every day at 6 am, taper as per RA  · 	acetaminophen 325 mg oral tablet: 2 tab(s) orally every 6 hours As needed Temp greater or equal to 38C (100.4F), Mild Pain (1 - 3)  · 	Synthroid 125 mcg (0.125 mg) oral tablet: 1 tab(s) orally once a day in am  · 	simvastatin 40 mg oral tablet: 1 tab(s) orally  · 	losartan 100 mg oral tablet: 1 tab(s) orally  · 	clopidogrel 75 mg oral tablet: 1 tab(s) orally  · 	pregabalin 200 mg oral capsule: 1 cap(s) orally once a day (at bedtime)    MEDICATIONS  (STANDING):  atorvastatin 20 milliGRAM(s) Oral at bedtime  levothyroxine 125 MICROGram(s) Oral daily  losartan 100 milliGRAM(s) Oral daily  pregabalin 200 milliGRAM(s) Oral at bedtime  sodium chloride 0.9%. 1000 milliLiter(s) (125 mL/Hr) IV Continuous <Continuous>    MEDICATIONS  (PRN):  morphine  - Injectable 4 milliGRAM(s) IV Push every 4 hours PRN breakthrough  oxyCODONE    IR 2.5 milliGRAM(s) Oral every 4 hours PRN Moderate Pain (4 - 6)  oxyCODONE    IR 5 milliGRAM(s) Oral every 4 hours PRN Severe Pain (7 - 10)      FAMILY HISTORY:  Family history of MI (myocardial infarction) (Father)      No family history of premature coronary artery disease or sudden cardiac death    SOCIAL HISTORY:  Smoking-[ ] Active  [ ] Former [ x] Non Smoker  Alcohol-[ ] Denies [x ] Social [ ] Daily  Ilicit Drug use-[ x] Denies [ ] Active user    REVIEW OF SYSTEMS:  Constitutional: [ ] fever, [ ]weight loss, [ x]fatigue   Activity [ ] Bedbound,[x ] Ambulates [ ] Unassisted[ ] Cane/Walker [x ] Assistence.  Effort tolerance:[ ] Excellent [ ] Good [ ] Fair [ ] Poor [ ]  Eyes: [ ] visual changes  Respiratory: [ ]shortness of breath;  [ ] cough, [ ]wheezing, [ ]chills, [ ]hemoptysis  Cardiovascular: [ ] chest pain, [ ]palpitations, [ ]dizziness,  [ ]leg swelling[ ]orthopnea [ ]PND  Gastrointestinal: [ ] abdominal pain, [ ]nausea, [ ]vomiting,  [ ]diarrhea,[ ]constipation  Genitourinary: [ ] dysuria, [ ] hematuria  Neurologic: [ ] headaches [ ] tremors[ ] weakness  Skin: [ ] itching, [ ]burning, [ ] rashes  Endocrine: [ ] heat or cold intolerance  Musculoskeletal: [x ] joint pain or swelling; [x ] muscle, back, or extremity pain  Psychiatric: [ ] depression, [ ]anxiety, [ ]mood swings, or [ ]difficulty sleeping  Hematologic: [ ] easy bruising, [ ] bleeding gums       [ x] All others negative	  [ ] Unable to obtain    Vital Signs Last 24 Hrs  T(C): 36.6 (28 Aug 2024 05:40), Max: 36.8 (28 Aug 2024 01:50)  T(F): 97.9 (28 Aug 2024 05:40), Max: 98.2 (28 Aug 2024 01:50)  HR: 81 (28 Aug 2024 05:40) (70 - 81)  BP: 152/85 (28 Aug 2024 05:40) (152/85 - 170/62)  RR: 18 (28 Aug 2024 05:40) (18 - 18)  SpO2: 99% (28 Aug 2024 05:40) (98% - 99%)    Parameters below as of 28 Aug 2024 05:40  Patient On (Oxygen Delivery Method): room air      I&O's Summary      PHYSICAL EXAM:  General: No acute distress BMI-32  HEENT: EOMI, PERRL[ ] Icteric  Neck: Supple, [ ] JVD  Lungs: Equal air entry bilaterally; [ ] Rales [ ] Rhonchi [ ] Wheezing  Heart: Regular rate and rhythm;[ ] Murmurs-   /6 [ ] Systolic [ ] Diastolic [ ] Radiation,No rubs, or gallops  Abdomen: Nontender, bowel sounds present  Extremities: No clubbing, cyanosis, [ x] edema[ ] Calf tenderness{x] LLE externally rotated with tenderness over L hip.  Nervous system:  Alert & Oriented X3, no focal deficits  Psychiatric: Normal affect  Skin: No rashes or lesions      LABS:  08-28    143  |  108<H>  |  21  ----------------------------<  96  3.9   |  21<L>  |  0.81    Ca    8.4      28 Aug 2024 04:06    TPro  6.0  /  Alb  3.7  /  TBili  0.3  /  DBili  x   /  AST  20  /  ALT  17  /  AlkPhos  49  08-28    Creatinine Trend: 0.81<--                        10.5   10.56 )-----------( 249      ( 28 Aug 2024 04:06 )             35.0     PT/INR - ( 28 Aug 2024 04:06 )   PT: 11.0 sec;   INR: 0.98 ratio         PTT - ( 28 Aug 2024 04:06 )  PTT:27.8 sec     MR:- [id number] :  NAME:-ABDON MARTINEZ:-    DATE OF SERVICE:08-28-24 @ 07:17    Patient was seen,examined and evaluated  by Anthony Orellana MD [id number] @ 07:17 .  ER evaluation, Labs and Hospital course was reviewed,    CHIEF COMPLAINT:Fall    HPI: 83-year-old female with past medical history of carotid stenosis on Plavix, HTN, HLD, osteoarthritis (does follow with rheumatology currently on prednisone taper), prior mandibular CA with reconstruction, upper extremity neuropathy who presented to the ER  after mechanical fall at home.Patient states she tripped over her feet falling onto the left side.  Patient states she hit her left shoulder wrist and left hip.Patient denies head injury or LOC.  No headache neck pain vision change.Patient not able to ambulate.   Noted Acute LEFT  nondisplaced transverse intertrochanteric fracture and  L distal radius fracture and ulnar styloid fracture   Patient does not have any Hx HF,CAD or arrhythmias      CARDIAC HISTORY:  [ ] CAD [ [PCI [ ] CABG [ ] Prior Cath  [ ] Atrial Fibrillation  Devices[ ] PPM [ ] ICD [ ]ILR  Heart Failure [ ] HFrEF [ ] HFpEF    PAST MEDICAL & SURGICAL HISTORY:  Hypothyroid      Hyperlipidemia      Oral Cancer      Basal Cell Cancer  biopsy of nose  5/2010      Malignant neoplasm of mandible  left- 2008      Malignant neoplasm of tongue      Radiation therapy complication  burn to left neck area      History of radiation therapy  2008      Radiation dermatitis  left neck      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      Hardware complicating wound infection  S/P removal of hardware 6/2008      S/P biopsy  tongue lesion 7/2014      H/O right breast biopsy  1980's      History of cataract  left and right ; 6/2017, 12/2017      Home Medications:   * Patient Currently Takes Medications as of 30-Nov-2023 17:49 documented in Structured Notes  · 	predniSONE 1 mg oral tablet: 4 tab(s) orally once a day everyday at 6 pm taper as per RA  · 	predniSONE 5 mg oral tablet: 1 tab(s) orally once a day every day at 6 am, taper as per RA  · 	acetaminophen 325 mg oral tablet: 2 tab(s) orally every 6 hours As needed Temp greater or equal to 38C (100.4F), Mild Pain (1 - 3)  · 	Synthroid 125 mcg (0.125 mg) oral tablet: 1 tab(s) orally once a day in am  · 	simvastatin 40 mg oral tablet: 1 tab(s) orally  · 	losartan 100 mg oral tablet: 1 tab(s) orally  · 	clopidogrel 75 mg oral tablet: 1 tab(s) orally  · 	pregabalin 200 mg oral capsule: 1 cap(s) orally once a day (at bedtime)    MEDICATIONS  (STANDING):  atorvastatin 20 milliGRAM(s) Oral at bedtime  levothyroxine 125 MICROGram(s) Oral daily  losartan 100 milliGRAM(s) Oral daily  pregabalin 200 milliGRAM(s) Oral at bedtime  sodium chloride 0.9%. 1000 milliLiter(s) (125 mL/Hr) IV Continuous <Continuous>    MEDICATIONS  (PRN):  morphine  - Injectable 4 milliGRAM(s) IV Push every 4 hours PRN breakthrough  oxyCODONE    IR 2.5 milliGRAM(s) Oral every 4 hours PRN Moderate Pain (4 - 6)  oxyCODONE    IR 5 milliGRAM(s) Oral every 4 hours PRN Severe Pain (7 - 10)      FAMILY HISTORY:  Family history of MI (myocardial infarction) (Father)      No family history of premature coronary artery disease or sudden cardiac death    SOCIAL HISTORY:  Smoking-[ ] Active  [ ] Former [ x] Non Smoker  Alcohol-[ ] Denies [x ] Social [ ] Daily  Ilicit Drug use-[ x] Denies [ ] Active user    REVIEW OF SYSTEMS:  Constitutional: [ ] fever, [ ]weight loss, [ x]fatigue   Activity [ ] Bedbound,[x ] Ambulates [ ] Unassisted[ ] Cane/Walker [x ] Assistence.  Effort tolerance:[ ] Excellent [ ] Good [ ] Fair [ ] Poor [ ]  Eyes: [ ] visual changes  Respiratory: [ ]shortness of breath;  [ ] cough, [ ]wheezing, [ ]chills, [ ]hemoptysis  Cardiovascular: [ ] chest pain, [ ]palpitations, [ ]dizziness,  [ ]leg swelling[ ]orthopnea [ ]PND  Gastrointestinal: [ ] abdominal pain, [ ]nausea, [ ]vomiting,  [ ]diarrhea,[ ]constipation  Genitourinary: [ ] dysuria, [ ] hematuria  Neurologic: [ ] headaches [ ] tremors[ ] weakness  Skin: [ ] itching, [ ]burning, [ ] rashes  Endocrine: [ ] heat or cold intolerance  Musculoskeletal: [x ] joint pain or swelling; [x ] muscle, back, or extremity pain  Psychiatric: [ ] depression, [ ]anxiety, [ ]mood swings, or [ ]difficulty sleeping  Hematologic: [ ] easy bruising, [ ] bleeding gums       [ x] All others negative	  [ ] Unable to obtain    Vital Signs Last 24 Hrs  T(C): 36.6 (28 Aug 2024 05:40), Max: 36.8 (28 Aug 2024 01:50)  T(F): 97.9 (28 Aug 2024 05:40), Max: 98.2 (28 Aug 2024 01:50)  HR: 81 (28 Aug 2024 05:40) (70 - 81)  BP: 152/85 (28 Aug 2024 05:40) (152/85 - 170/62)  RR: 18 (28 Aug 2024 05:40) (18 - 18)  SpO2: 99% (28 Aug 2024 05:40) (98% - 99%)    Parameters below as of 28 Aug 2024 05:40  Patient On (Oxygen Delivery Method): room air      I&O's Summary      PHYSICAL EXAM:  General: No acute distress BMI-32  HEENT: EOMI, PERRL[ ] Icteric  Neck: Supple, [ ] JVD  Lungs: Equal air entry bilaterally; [ ] Rales [ ] Rhonchi [ ] Wheezing  Heart: Regular rate and rhythm;[ ] Murmurs-   /6 [ ] Systolic [ ] Diastolic [ ] Radiation,No rubs, or gallops  Abdomen: Nontender, bowel sounds present  Extremities: No clubbing, cyanosis, [ x] edema[ ] Calf tenderness{x] LLE externally rotated with tenderness over L hip.  Nervous system:  Alert & Oriented X3, no focal deficits  Psychiatric: Normal affect  Skin: No rashes or lesions      LABS:  08-28    143  |  108<H>  |  21  ----------------------------<  96  3.9   |  21<L>  |  0.81    Ca    8.4      28 Aug 2024 04:06    TPro  6.0  /  Alb  3.7  /  TBili  0.3  /  DBili  x   /  AST  20  /  ALT  17  /  AlkPhos  49  08-28    Creatinine Trend: 0.81<--                        10.5   10.56 )-----------( 249      ( 28 Aug 2024 04:06 )             35.0     PT/INR - ( 28 Aug 2024 04:06 )   PT: 11.0 sec;   INR: 0.98 ratio         PTT - ( 28 Aug 2024 04:06 )  PTT:27.8 sec      Xray Chest 1 View AP/PA (08.28.24 @ 06:26) >  FINDINGS:  No focal consolidations  There is no pleural effusion or pneumothorax.  The heart is normal in size  The visualized osseous structures demonstrate no acute pathology.    IMPRESSION:  No focal consolidations.      Xray Hip 2-3 Views, Left (08.28.24 @ 04:04) >    IMPRESSION:  Acute nondisplaced transverse intertrochanteric fracture.       Xray Humerus, Left (08.28.24 @ 04:03) >  IMPRESSION:  Acute impacted transverse fracture of the distal left radial metaphysis.   Minimally displaced acute ulnar styloid fracture.

## 2024-08-28 NOTE — PATIENT PROFILE ADULT - FALL HARM RISK - HARM RISK INTERVENTIONS
Assistance with ambulation/Assistance OOB with selected safe patient handling equipment/Communicate Risk of Fall with Harm to all staff/Discuss with provider need for PT consult/Monitor gait and stability/Provide patient with walking aids - walker, cane, crutches/Reinforce activity limits and safety measures with patient and family/Sit up slowly, dangle for a short time, stand at bedside before walking/Tailored Fall Risk Interventions/Use of alarms - bed, chair and/or voice tab/Visual Cue: Yellow wristband and red socks/Bed in lowest position, wheels locked, appropriate side rails in place/Call bell, personal items and telephone in reach/Instruct patient to call for assistance before getting out of bed or chair/Non-slip footwear when patient is out of bed/[place] to call system/Physically safe environment - no spills, clutter or unnecessary equipment/Purposeful Proactive Rounding/Room/bathroom lighting operational, light cord in reach

## 2024-08-28 NOTE — ASU PREOP CHECKLIST - COMMENTS
06/18/19    PCP: Tang Contreras NP    caretaker Soheila Jenkins     Hypertension    [facility] [address] 12/07/2016    Legally blind 12/07/2016    Slurred speech 11/18/2016    CVA (cerebral vascular accident) ([facility] [address] Sip of water with meds see MAR
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
## 2024-08-28 NOTE — DISCHARGE NOTE PROVIDER - CARE PROVIDER_API CALL
Barney Miller  Orthopaedic Surgery  [address] [place] [address], [address]  [place] [address]  Phone: [phone]  Fax: [phone]  Follow Up Time:

## 2024-08-28 NOTE — ASU PREOP CHECKLIST - 3.
skin check done on admit to ASU - skin check done on admit to ASU - no breakdwon noted      /// left arm ace wrap from upper arm to wrist - good capillary refill - fingers warm to t  touch skin check done on admit to ASU - no breakdown noted  - unable to look at patient's back / buttocks - Pt  crying in pain     /// left arm ace wrap from upper arm to wrist - good capillary refill - fingers warm to t  touch Partial skin check done on admit to ASU - no breakdown noted  - unable to look at patient's back / buttocks - Patient denies any skin breakdown cuts bruises sores . Pt  crying out in pain when attempted to turn // to be done when patient is under anesthesia  OR RN aware  /// left arm ace wrap from upper arm to wrist - good capillary refill - fingers warm to t  touch

## 2024-08-28 NOTE — ED ADULT TRIAGE NOTE - CHIEF COMPLAINT QUOTE
C/o fall 1 hour ago. endorsing trip and fall, landed on L side of body. endorsing pain to L upper and lower extremities. denies head strike, LOC. pt takes Plavix. no obvious deformities noted. Hx tounge CA. C/o fall 1 hour ago. endorsing trip and fall, landed on L side of body. endorsing pain to L upper and lower extremities, +ROM and sensation noted. denies head strike, LOC. pt takes Plavix. no obvious deformities noted. Hx tounge CA. C/o fall 1 hour ago. endorsing trip and fall, landed on L side of body. endorsing pain to L upper and lower extremities, +ROM and sensation noted. denies head strike, LOC. pt takes Plavix. no obvious deformities noted. Hx tongue/ mandible CA. C/o fall 1 hour ago. endorsing trip and fall, landed on L side of body. endorsing pain to L upper and lower extremities, +ROM and sensation noted. denies head strike, LOC. pt takes Plavix. no obvious deformities noted. Hx tongue/ mandible C, osteoporosis

## 2024-08-28 NOTE — ED PROVIDER NOTE - ATTENDING APP SHARED VISIT CONTRIBUTION OF CARE
I have personally performed a face to face medical and diagnostic evaluation of the patient. I have discussed with and reviewed the ACP's note and agree with the History, ROS, Physical Exam and MDM unless otherwise indicated. A brief summary of my personal evaluation and impression can be found below.     83-year-old female, past medical history as detailed above, including osteoporosis presenting with chief complaint of left-sided body pain after trip and fall.  Patient states that she landed on her hip, and hit her left wrist against the counter while falling.  Denies any head trauma.  No LOC.  Has not been able to get up and walk since the incident.  On exam, vital signs stable, tenderness to palpation over the left hip and left wrist with obvious deformities.  The rest of the traumatic exam is unremarkable.  Concern for left hip fracture as well as possible ulnar and radial fracture of the wrist.  Plan for appropriate x-rays, pain control, will likely need Ortho consult, reassess to dispo. Michael Hodge, ED Attending

## 2024-08-28 NOTE — ED PROVIDER NOTE - ENMT, MLM
Airway patent, Nasal mucosa clear. Mouth with normal mucosa. Throat has no vesicles, no oropharyngeal exudates and uvula is midline.  NC/AT.

## 2024-08-28 NOTE — ED ADULT NURSE NOTE - CHIEF COMPLAINT QUOTE
C/o fall 1 hour ago. endorsing trip and fall, landed on L side of body. endorsing pain to L upper and lower extremities, +ROM and sensation noted. denies head strike, LOC. pt takes Plavix. no obvious deformities noted. Hx tongue/ mandible C, osteoporosis

## 2024-08-28 NOTE — DISCHARGE NOTE PROVIDER - NSDCCPTREATMENT_GEN_ALL_CORE_FT
PRINCIPAL PROCEDURE  Procedure: Intramedullary nailing of femur  Findings and Treatment: Pain control:        -Acetaminophen 500mg - 2 tabs every 8 hours  As needed:        -Tramadol 50mg - 1 tab every 6 hours - Take only if needed for MODERATE pain       -oxycodone 5mg - 1 tab every 4-6 hours - Take only if needed for SEVERE or BREAKTHROUGH pain  Oxycodone and Tramadol have been sent to your pharmacy. Please do not drive, operate machinery, or make important decisions while taking these medications.   Other Medications:  -Aspirin (Enteric Coated) ********mg every 12 hours - to prevent blood clots (for 6 weeks post operatively.)  -Protonix 40mg - 1 tab every 24 hours - to prevent stomach irritation/ulcers  -Senna 8.6mg - 2 pills every 24 hours - stool softener  -Miralax 17g - daily - constipation   Follow up: Please follow up at your prescheduled post-operative follow up appointment with Dr. Miller for 2 weeks after hospital discharge. Please call with any questions or concerns including fevers, worsening pain, pus from the wounds, redness of the skin and difficulty breathing or heaviness in the chest at [phone].

## 2024-08-28 NOTE — ED PROVIDER NOTE - HIV OFFER
Medication: hydrocodone-acetaminophen  Si tablet every 4 hours  Qty: 40  Dosage: 5-325mg  Last Refill: 20  Pharmacy: [facility] on [address]  Patient last seen:  Next appointment to be seen:    
refilled  
Previously Declined (within the last year)

## 2024-08-28 NOTE — H&P ADULT - ATTENDING COMMENTS
Shanique Lrason is a 83 year old female, past medical history of PMR, HLD, hypothyroidism, carotid stenosis (on Plavix), mandibular cancer s/p resection/radiation, who presented to the [facility] ER following a fall at home. Patient was walking, when she tripped and fell onto her left side. She was unable to ambulate and presented to the [facility] ER. XRays showed a left intertrochanteric hip fracture and a left distal radius fracture. The distal radius fracture was reduced and splinted. Patient was admitted to Orthopedics for operative management of her left hip. She was cleared by Medicine and Cardiology.     Discussed the operative and nonoperative management at length with the patient. Nonoperative management would consist of bedrest. Discussed the risks of nonoperative management including, but not limited to, continued pain, pressure ulcers, pneumonia, urinary tract infection, and death. Operative management would consist of left hip open reduction, internal fixation and placement of intramedullary nail. The risks, benefits and alternatives to intramedullary nail were discussed with the patient in detail and the patient elected to proceed with surgery. Discussed the surgical plan with the patient including implant options and surgical approach. Discussed the recovery from intramedullary nail, including inpatient hospital stay, rehabilitation center placement, postoperative ambulation, and DVT prophylaxis. Discussed the risks of surgery including, but not limited to, blood loss, injuries to nerves and vessels, heart attack, stroke, death, organ failure, nonunion, malunion, hardware failure, screw cut out, need for further surgery, need for future total hip arthroplasty, avascular necrosis/osteonecrosis, failure to achieve desired results, continued pain, VTE, decreased ambulation, and infection. Following discussion, the patient elected to proceed with left hip open reduction, internal fixation, placement of intramedullary nail. Informed consent was obtained with the patient. Patient's leg was then marked with verbal confirmation of the patient. The patient was understanding and in agreement with the operative plan. All questions were answered.     Assessment/Plan:  Sahnique Larson is a 83 year old female with a left intertrochanteric hip fracture    Plan:  -Left hip open reduction, internal fixation, placement of intramedullary nail.  -Clearance in chart

## 2024-08-28 NOTE — ED ADULT NURSE NOTE - OBJECTIVE STATEMENT
Pt A&Ox4 ambulatory at baseline with walker, PMH tongue/ mandible CA, osteoporosis, HLD, Hypothyroid,  presenting to the ED (RM 20) c/o mechanical fall. Pt states was walking around the kitchen, when she tripped and fell on L side. Pt endorses pain to L side of  body. + ROM noted, pt endorses pain upon movement. Pt able to move all digits from upper and lower extremities. Denies head strike or LOC. Pt admits to AC use. Denies any symptoms during the fall. Pt at this moment Denies cp, sob, palpitations, dizziness, lightheadedness, n/v/d, fever, chills, cough, HA, blurry vision. Respirations are even and unlabored, NAD, 20G IV R forearm, labs sent. medicated as per orders. Pending XR results. Safety precautions implemented as per protocol, awaiting further MD orders, plan of care ongoing.

## 2024-08-29 ENCOUNTER — TRANSCRIPTION ENCOUNTER (OUTPATIENT)
Age: 84
End: 2024-08-29

## 2024-08-29 DIAGNOSIS — I10 ESSENTIAL (PRIMARY) HYPERTENSION: ICD-10-CM

## 2024-08-29 DIAGNOSIS — E03.9 HYPOTHYROIDISM, UNSPECIFIED: ICD-10-CM

## 2024-08-29 DIAGNOSIS — E83.51 HYPOCALCEMIA: ICD-10-CM

## 2024-08-29 DIAGNOSIS — M35.3 POLYMYALGIA RHEUMATICA: ICD-10-CM

## 2024-08-29 DIAGNOSIS — Z29.9 ENCOUNTER FOR PROPHYLACTIC MEASURES, UNSPECIFIED: ICD-10-CM

## 2024-08-29 DIAGNOSIS — C41.1 MALIGNANT NEOPLASM OF MANDIBLE: ICD-10-CM

## 2024-08-29 DIAGNOSIS — I65.29 OCCLUSION AND STENOSIS OF UNSPECIFIED CAROTID ARTERY: ICD-10-CM

## 2024-08-29 DIAGNOSIS — S72.002A FRACTURE OF UNSPECIFIED PART OF NECK OF LEFT FEMUR, INITIAL ENCOUNTER FOR CLOSED FRACTURE: ICD-10-CM

## 2024-08-29 DIAGNOSIS — S62.102A FRACTURE OF UNSPECIFIED CARPAL BONE, LEFT WRIST, INITIAL ENCOUNTER FOR CLOSED FRACTURE: ICD-10-CM

## 2024-08-29 DIAGNOSIS — D64.9 ANEMIA, UNSPECIFIED: ICD-10-CM

## 2024-08-29 LAB
ADD ON TEST-SPECIMEN IN LAB: SIGNIFICANT CHANGE UP
ALBUMIN SERPL ELPH-MCNC: 2.7 G/DL — LOW (ref 3.3–5)
ANION GAP SERPL CALC-SCNC: 11 MMOL/L — SIGNIFICANT CHANGE UP (ref 7–14)
BLD GP AB SCN SERPL QL: NEGATIVE — SIGNIFICANT CHANGE UP
BUN SERPL-MCNC: 12 MG/DL — SIGNIFICANT CHANGE UP (ref 7–23)
CALCIUM SERPL-MCNC: 6.8 MG/DL — LOW (ref 8.4–10.5)
CHLORIDE SERPL-SCNC: 109 MMOL/L — HIGH (ref 98–107)
CO2 SERPL-SCNC: 19 MMOL/L — LOW (ref 22–31)
CREAT SERPL-MCNC: 0.77 MG/DL — SIGNIFICANT CHANGE UP (ref 0.5–1.3)
EGFR: 76 ML/MIN/1.73M2 — SIGNIFICANT CHANGE UP
GLUCOSE BLDC GLUCOMTR-MCNC: 92 MG/DL — SIGNIFICANT CHANGE UP (ref 70–99)
GLUCOSE SERPL-MCNC: 98 MG/DL — SIGNIFICANT CHANGE UP (ref 70–99)
HCT VFR BLD CALC: 26.4 % — LOW (ref 34.5–45)
HCT VFR BLD CALC: 27 % — LOW (ref 34.5–45)
HGB BLD-MCNC: 7.8 G/DL — LOW (ref 11.5–15.5)
HGB BLD-MCNC: 8.1 G/DL — LOW (ref 11.5–15.5)
MCHC RBC-ENTMCNC: 24.4 PG — LOW (ref 27–34)
MCHC RBC-ENTMCNC: 24.5 PG — LOW (ref 27–34)
MCHC RBC-ENTMCNC: 29.5 GM/DL — LOW (ref 32–36)
MCHC RBC-ENTMCNC: 30 GM/DL — LOW (ref 32–36)
MCV RBC AUTO: 81.3 FL — SIGNIFICANT CHANGE UP (ref 80–100)
MCV RBC AUTO: 83 FL — SIGNIFICANT CHANGE UP (ref 80–100)
NRBC # BLD: 0 /100 WBCS — SIGNIFICANT CHANGE UP (ref 0–0)
NRBC # BLD: 0 /100 WBCS — SIGNIFICANT CHANGE UP (ref 0–0)
NRBC # FLD: 0 K/UL — SIGNIFICANT CHANGE UP (ref 0–0)
NRBC # FLD: 0 K/UL — SIGNIFICANT CHANGE UP (ref 0–0)
PLATELET # BLD AUTO: 182 K/UL — SIGNIFICANT CHANGE UP (ref 150–400)
PLATELET # BLD AUTO: 189 K/UL — SIGNIFICANT CHANGE UP (ref 150–400)
POTASSIUM SERPL-MCNC: 4.2 MMOL/L — SIGNIFICANT CHANGE UP (ref 3.5–5.3)
POTASSIUM SERPL-SCNC: 4.2 MMOL/L — SIGNIFICANT CHANGE UP (ref 3.5–5.3)
RBC # BLD: 3.18 M/UL — LOW (ref 3.8–5.2)
RBC # BLD: 3.32 M/UL — LOW (ref 3.8–5.2)
RBC # FLD: 16.2 % — HIGH (ref 10.3–14.5)
RBC # FLD: 16.3 % — HIGH (ref 10.3–14.5)
RH IG SCN BLD-IMP: POSITIVE — SIGNIFICANT CHANGE UP
SODIUM SERPL-SCNC: 139 MMOL/L — SIGNIFICANT CHANGE UP (ref 135–145)
WBC # BLD: 7.59 K/UL — SIGNIFICANT CHANGE UP (ref 3.8–10.5)
WBC # BLD: 7.89 K/UL — SIGNIFICANT CHANGE UP (ref 3.8–10.5)
WBC # FLD AUTO: 7.59 K/UL — SIGNIFICANT CHANGE UP (ref 3.8–10.5)
WBC # FLD AUTO: 7.89 K/UL — SIGNIFICANT CHANGE UP (ref 3.8–10.5)

## 2024-08-29 PROCEDURE — 99223 1ST HOSP IP/OBS HIGH 75: CPT

## 2024-08-29 RX ORDER — OXYCODONE HYDROCHLORIDE 5 MG/1
5 TABLET ORAL EVERY 4 HOURS
Refills: 0 | Status: DISCONTINUED | OUTPATIENT
Start: 2024-08-29 | End: 2024-09-01

## 2024-08-29 RX ORDER — FOLIC ACID/MULTIVIT,IRON,MINER 0.4MG-18MG
1000 TABLET,CHEWABLE ORAL DAILY
Refills: 0 | Status: DISCONTINUED | OUTPATIENT
Start: 2024-08-29 | End: 2024-09-01

## 2024-08-29 RX ORDER — ACETAMINOPHEN 325 MG/1
975 TABLET ORAL EVERY 8 HOURS
Refills: 0 | Status: DISCONTINUED | OUTPATIENT
Start: 2024-08-29 | End: 2024-09-01

## 2024-08-29 RX ORDER — CALCIUM GLUCONATE 61(648) MG
2 TABLET ORAL
Refills: 0 | Status: COMPLETED | OUTPATIENT
Start: 2024-08-29 | End: 2024-08-29

## 2024-08-29 RX ORDER — HYDROMORPHONE HYDROCHLORIDE 2 MG/1
0.5 TABLET ORAL ONCE
Refills: 0 | Status: DISCONTINUED | OUTPATIENT
Start: 2024-08-29 | End: 2024-08-29

## 2024-08-29 RX ORDER — OXYCODONE HYDROCHLORIDE 5 MG/1
10 TABLET ORAL EVERY 4 HOURS
Refills: 0 | Status: DISCONTINUED | OUTPATIENT
Start: 2024-08-29 | End: 2024-09-01

## 2024-08-29 RX ORDER — HYDROMORPHONE HYDROCHLORIDE 2 MG/1
1 TABLET ORAL EVERY 6 HOURS
Refills: 0 | Status: DISCONTINUED | OUTPATIENT
Start: 2024-08-29 | End: 2024-08-31

## 2024-08-29 RX ORDER — CALCIUM GLUCONATE 61(648) MG
2 TABLET ORAL
Refills: 0 | Status: DISCONTINUED | OUTPATIENT
Start: 2024-08-29 | End: 2024-08-29

## 2024-08-29 RX ORDER — ACETAMINOPHEN 325 MG/1
1000 TABLET ORAL ONCE
Refills: 0 | Status: COMPLETED | OUTPATIENT
Start: 2024-08-29 | End: 2024-08-29

## 2024-08-29 RX ADMIN — ACETAMINOPHEN 400 MILLIGRAM(S): 325 TABLET ORAL at 17:28

## 2024-08-29 RX ADMIN — OXYCODONE HYDROCHLORIDE 10 MILLIGRAM(S): 5 TABLET ORAL at 21:50

## 2024-08-29 RX ADMIN — OXYCODONE HYDROCHLORIDE 5 MILLIGRAM(S): 5 TABLET ORAL at 10:51

## 2024-08-29 RX ADMIN — ENOXAPARIN SODIUM 40 MILLIGRAM(S): 100 INJECTION SUBCUTANEOUS at 17:57

## 2024-08-29 RX ADMIN — CEFAZOLIN SODIUM 100 MILLIGRAM(S): 2 INJECTION, SOLUTION INTRAVENOUS at 04:08

## 2024-08-29 RX ADMIN — Medication 1000 UNIT(S): at 11:51

## 2024-08-29 RX ADMIN — OXYCODONE HYDROCHLORIDE 5 MILLIGRAM(S): 5 TABLET ORAL at 15:13

## 2024-08-29 RX ADMIN — OXYCODONE HYDROCHLORIDE 10 MILLIGRAM(S): 5 TABLET ORAL at 17:28

## 2024-08-29 RX ADMIN — OXYCODONE HYDROCHLORIDE 5 MILLIGRAM(S): 5 TABLET ORAL at 11:51

## 2024-08-29 RX ADMIN — LOSARTAN POTASSIUM 100 MILLIGRAM(S): 50 TABLET ORAL at 05:26

## 2024-08-29 RX ADMIN — OXYCODONE HYDROCHLORIDE 10 MILLIGRAM(S): 5 TABLET ORAL at 22:50

## 2024-08-29 RX ADMIN — ACETAMINOPHEN 975 MILLIGRAM(S): 325 TABLET ORAL at 10:29

## 2024-08-29 RX ADMIN — OXYCODONE HYDROCHLORIDE 10 MILLIGRAM(S): 5 TABLET ORAL at 18:28

## 2024-08-29 RX ADMIN — Medication 3 MILLIGRAM(S): at 21:50

## 2024-08-29 RX ADMIN — ACETAMINOPHEN 975 MILLIGRAM(S): 325 TABLET ORAL at 10:19

## 2024-08-29 RX ADMIN — HYDROMORPHONE HYDROCHLORIDE 0.5 MILLIGRAM(S): 2 TABLET ORAL at 15:33

## 2024-08-29 RX ADMIN — OXYCODONE HYDROCHLORIDE 5 MILLIGRAM(S): 5 TABLET ORAL at 06:23

## 2024-08-29 RX ADMIN — HYDROMORPHONE HYDROCHLORIDE 0.5 MILLIGRAM(S): 2 TABLET ORAL at 15:13

## 2024-08-29 RX ADMIN — Medication 4 MILLIGRAM(S): at 05:26

## 2024-08-29 RX ADMIN — Medication 200 GRAM(S): at 10:12

## 2024-08-29 RX ADMIN — OXYCODONE HYDROCHLORIDE 5 MILLIGRAM(S): 5 TABLET ORAL at 07:23

## 2024-08-29 RX ADMIN — Medication 150 MICROGRAM(S): at 04:08

## 2024-08-29 RX ADMIN — ACETAMINOPHEN 1000 MILLIGRAM(S): 325 TABLET ORAL at 17:48

## 2024-08-29 RX ADMIN — OXYCODONE HYDROCHLORIDE 5 MILLIGRAM(S): 5 TABLET ORAL at 14:13

## 2024-08-29 NOTE — PHYSICAL THERAPY INITIAL EVALUATION ADULT - LEVEL OF INDEPENDENCE: SUPINE/SIT, REHAB EVAL
Pt noted to have difficulty fully sitting upright due to pain on left side when sitting up. Pt noted to be leaning towards right side to avoid pressure on left side./moderate assist (50% patients effort)

## 2024-08-29 NOTE — CONSULT NOTE ADULT - PROBLEM SELECTOR RECOMMENDATION 4
Expected postop blood loss, 100cc EBL in OR 8/28  Baseline Hgb 9-10  Continue monitoring H/H  Transfuse for symptoms of Hgb<7
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
## 2024-08-29 NOTE — OCCUPATIONAL THERAPY INITIAL EVALUATION ADULT - RANGE OF MOTION EXAMINATION, UPPER EXTREMITY
Left UE active assisted ROM: shoulder grossly 0-100 degrees flexion, unable to assess elbow/wrist due to splinting, left digits grossly WFL/Right UE Active ROM was WFL (within functional limits)

## 2024-08-29 NOTE — CONSULT NOTE ADULT - PROBLEM SELECTOR RECOMMENDATION 3
Corrected calcium for albumin at 7.8  Calcium supplementation ordered  Vit D 25OH at 28.2 suggestive of insufficiency: started supplementation  Trend calcium daily

## 2024-08-29 NOTE — PHYSICAL THERAPY INITIAL EVALUATION ADULT - ADDITIONAL COMMENTS
Pt states she lives with her  and son in a house with 2 steps to enter at the front and walk-in entrance through the garage. Inside the house, there are 6+6 steps to the bedroom. Prior to admission, pt was ambulating with a rolling walker. Pt states she had 2 falls in the past 6 months. Pt has a home health aide 3 days/week, 4 hours/day.   Post PT evaluation, pt left with head of bed elevated to 30 degrees, alarm on, call bell and remote within reach, all precautions maintained, NAD. RN aware.

## 2024-08-29 NOTE — CONSULT NOTE ADULT - TIME BILLING
- Review of records, telemetry, vital signs and daily labs.   - General and cardiovascular physical examination.  - Generation of cardiovascular treatment plan and completion of note .  - Coordination of care.      Patient was seen and examined by me on  08/28/2024 ,interim events noted,labs and radiology studies reviewed.  Anthony Orellana MD,FACC.  [id number] [id number].  [phone]
Time spent includes direct patient care  (interview and examination of patient), discussion with other providers, support staff and/or patient's family members, review of medical records, ordering diagnostic tests and analyzing results, and documentation. Time spent was exclusive of teaching residents.

## 2024-08-29 NOTE — PHYSICAL THERAPY INITIAL EVALUATION ADULT - IMPAIRMENTS FOUND, PT EVAL
aerobic capacity/endurance/gait, locomotion, and balance/muscle strength You can access the FollowMyHealth Patient Portal offered by [facility] by registering at the following website: [email or web address]. By joining [employer]’s FollowMyHealth portal, you will also be able to view your health information using other applications (apps) compatible with our system.

## 2024-08-29 NOTE — OCCUPATIONAL THERAPY INITIAL EVALUATION ADULT - DIAGNOSIS, OT EVAL
s/p fall, s/p left UE distal radius fracture, s/p left hip IMN; decreased functional mobility, decreased ADL performance, decreased functional use of left UE

## 2024-08-29 NOTE — OCCUPATIONAL THERAPY INITIAL EVALUATION ADULT - PERTINENT HX OF CURRENT PROBLEM, REHAB EVAL
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 Found to have left UE distal radius fracture and left femoral intertrochanteric fracture now s/p left hip IMN on 8/28/24.

## 2024-08-29 NOTE — DISCHARGE NOTE NURSING/CASE MANAGEMENT/SOCIAL WORK - NSDCPECAREGIVERED_GEN_ALL_CORE
Medline and carenotes for surgical procedure IM Nail, hip Fracture, incision care, pain mgt, Oxy IR, as well as DC Medications and side effects literature for patient reference.

## 2024-08-29 NOTE — CONSULT NOTE ADULT - ASSESSMENT
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 Found to have left intertrochanteric hip fracture and a left distal radius fracture.
83-year-old female with past medical history of carotid stenosis on Plavix, HTN, HLD, osteoarthritis (does follow with rheumatology currently on prednisone taper), prior mandibular CA with reconstruction, upper extremity neuropathy who presented to the ER c/o L hip and L wrist pain s/p mechanical fall. Pt states she fell tripped and fell in her kitchen, hitting her wrist on the table before landing on her left side.    # FALL-LEFT IT fracture   -Mechanical fall no LOC  Scheduled for IM Nailing/Hemiarthroplasty  - Overall this patient is at   intermediate risk (for cardiac death, nonfatal myocardial infarction, and nonfatal cardiac arrest perioperatively for this intermediate  risk procedure).   The patient is however without evidence of ACS, Decompensated Heart Failure,Obstructive Valvular Heart disease or Unstable arrhythmia.   There  are  no further recommendation for risk stratifying imaging/stress testing prior to planned surgery

## 2024-08-29 NOTE — CONSULT NOTE ADULT - PROBLEM SELECTOR RECOMMENDATION 9
s/p Left Hip IMN 8/28  Postop management per ortho  Pain control, bowel regimen  PT/OT  LLE WBAT  DVT Prophylaxis: Lovenox c/w synthroid

## 2024-08-29 NOTE — DISCHARGE NOTE NURSING/CASE MANAGEMENT/SOCIAL WORK - NSDCPNINST_GEN_ALL_CORE
Maintain  incisions and dressings clean dry and intact. Call MD with any signs of infection ie fever, redness, drainage, or with signs of bleeding, unrelieved increased pain, or persistent nausea. Continue to drink plenty of fluids. Avoid strenuous activity and constipation which may be a side effect from taking certain medications and narcotics.  Continue safety and fall prevention measures as instructed to avoid injury.

## 2024-08-29 NOTE — CONSULT NOTE ADULT - PROBLEM SELECTOR RECOMMENDATION 2
distal radius fracture was reduced and splinted.  MELY PEREYRA in [facility] splint  Management as per ortho

## 2024-08-29 NOTE — CONSULT NOTE ADULT - PROBLEM SELECTOR RECOMMENDATION 8
c/w home dose prednisone, on extended taper outpatient   Outpt followup with rheumatology  c/w lyrica for UE neuropathy

## 2024-08-29 NOTE — PROGRESS NOTE ADULT - ATTENDING COMMENTS
Increase fluid intake, take antibiotics as prescribed, antihistamines on a daily basis as well.  Return to office if no improvement in 2 days   Patient seen and examined. Shanique Larson is a  year old female now status post Left Hip IMN. No acute events overnight.    Physical Exam:  Dressing: Clean, Dry, Intact  Motor: Intact EHL/FHL/Tibialis Anterior/Gastrocnemius  Sensory: Intact Superficial Peroneal/Deep Peroneal/Saphenous/Sural/Tibial Nerves  Vascular: 2+ DP Pulse      Assessment/Plan:  -Left Lower Extremity: Weight Bearing as Tolerated  -PT/OT, Out of Bed  -Pain Control: Per Protocol  -DVT Prophylaxis: Lovenox  -Disposition: Pending

## 2024-08-29 NOTE — OCCUPATIONAL THERAPY INITIAL EVALUATION ADULT - GENERAL OBSERVATIONS, REHAB EVAL
Patient received semisupine in bed in NAD; agreeable to participate in OT evaluation. +1L O2 via NC. +pulse oximeter. HR 89 bpm. Daughter at bedside.

## 2024-08-29 NOTE — DISCHARGE NOTE NURSING/CASE MANAGEMENT/SOCIAL WORK - PATIENT PORTAL LINK FT
You can access the FollowMyHealth Patient Portal offered by [facility] by registering at the following website: [email or web address]. By joining [employer]’s FollowMyHealth portal, you will also be able to view your health information using other applications (apps) compatible with our system.

## 2024-08-29 NOTE — OCCUPATIONAL THERAPY INITIAL EVALUATION ADULT - LIVES WITH, PROFILE
Patient lives in a house with her spouse and son with 2 steps to enter (reports she normally enters through the garage with no steps to manage) and 12 steps to bedroom,

## 2024-08-29 NOTE — DISCHARGE NOTE NURSING/CASE MANAGEMENT/SOCIAL WORK - NSDCPEPT PROEDMA_GEN_ALL_CORE
DATE OF PROCEDURE:  02/12/2019



PREOPERATIVE DIAGNOSES:  Menorrhagia, dysmenorrhea, dyspareunia, and rectocele.



POSTOPERATIVE DIAGNOSES:  Menorrhagia, dysmenorrhea, dyspareunia, and rectocele.



PROCEDURES PERFORMED:  Robotic assisted total laparoscopic hysterectomy with

bilateral salpingectomy, placement of ON-Q pump, and posterior colporrhaphy. 



ASSISTANTS:  Marina Lopez PA-C and Flori Hurtado MD



ANESTHESIA:  GETA per Dr. Verduzco.



ESTIMATED BLOOD LOSS:  Less than 100 mL.



COMPLICATIONS:  None.



URINE OUTPUT:  30 mL at the start of the case.



OPERATIVE FINDINGS:  

1. Grade 3 rectocele.

2. Uterus sounds to 8 cm, approximately 10 to 12 week size with multiple small

fibroids noted on laparoscopic evaluation, normal-appearing fallopian tubes,

normal-appearing ovaries bilaterally. 

3. Vaginal cuff hemostatic.



DESCRIPTION OF PROCEDURE:  The patient was taken back to the OR with IV fluids

running.  When she was in the OR, she was placed in dorsal supine position, and

anesthesia was obtained.  Once the patient was asleep, she was placed in low dorsal

lithotomy position.  The abdomen and vagina were prepped and draped in normal

fashion for gynecologic laparoscopy.  The surgeons were scrubbed in, gowned and

gloved, and the procedure began from the vagina.  A Diehl catheter was placed into

the urethra, approximately 30 mL of urine returned.  A Nelly syringe was placed on

the tip of the Diehl catheter to use for bladder manipulation if needed during the

case.  An operative speculum was placed into the vagina.  Large parous cervix was

noted with no abnormalities.  The anterior lip of the cervix was grasped with a

single-tooth tenaculum and the uterus was sounded to 8 cm.  The cervix was serially

dilated to allow for placement of a COSTA Koh-manipulator tip.  The COSTA

Koh-manipulator was assembled with a 8 cm tip and a 4 cm cup.  The uterine

manipulator and vaginal occluder were placed into the uterus and vagina in normal

fashion.  The surgeon's gloves were then changed.  Attention was turned to the

laparoscopic portion of the case beginning at the supraumbilical fold.  Local

anesthesia was placed underneath the skin.  A 12 mm skin incision was made with a

scalpel.  The skin incision was bluntly dissected and a Veress needle was placed

through the skin incision into the peritoneal cavity.  The gas was turned on and the

abdomen was insufflated without difficulty.  The Veress needle was then removed and

a 12 mm trocar was placed through the abdomen without difficulty.  After the trocar

was placed, the laparoscoped was directed through this trocar.  The patient was

placed in Trendelenburg position and the above findings were noted.  Next, under

direct visualization in similar fashion, the right and left lower quadrant 8 mm

robotic trocars were placed as well as a right upper quadrant 11 mm assistant port.

With all trocars placed, the robotic arms were docked to the patient's bedside.

They were similar to each trocar and the laparoscopic instruments were directed

through the trocars under direct visualization.  With all the instruments in the

operative field, the operative portion began.  Starting on the patient's left side,

the left fallopian tube was grasped with the grasper, elevated well away from the

pelvic sidewall, and dissected and removed with combination of bipolar and monopolar

cautery with the left fallopian tube removed from the adnexa.  The fallopian tube

was then removed from the surgical field and sent for pathologic review.  The

utero-ovarian ligament on the patient's left side was then cauterized and

transected, allowing the left ovary to fall with the pelvic sidewall.  The round

ligament on the patient's left side was cauterized, incised, and divided into

anterior posterior leaf.  The anterior leaf was taken down toward the bladder and

the posterior leaf was taken down, dissected down toward the level of the uterine

artery.  The uterine artery was then skeletonized, cauterized, and transected.  The

bladder flap was then continued from the anterior leaf of the broad ligament.  The

bladder was gently dissected away from the planned colpotomy site.  Next, attention

was turned to the contralateral side.  In similar fashion.  The right fallopian tube

was removed.  The utero-ovarian ligament on the patient's right side was transected

and the right ovary fell away to the pelvic sidewall.  The round ligament on the

patient's right side was cauterized and transected.  It was further divided down

into anterior and posterior leaves towards the level of the uterine artery.  Uterine

artery was then skeletonized.  Dissection was then completed from the right to the

contralateral dissection.  The cervical vaginal fascia was then dissected down away

from the planned colpotomy site.  After the uterine artery on the patient's right

side was cauterized and transected, attention was turned to the colpotomy portion of

the procedure.  The anterior colpotomy was made using the monopolar scissors.  The

colpotomy was completed circumferentially with good visualization and hemostasis

throughout this final portion of the dissection.  After the colpotomy was complete,

the uterine specimen was retracted into the vagina.  Minimal bleeding was noted from

the vaginal cuff, which was controlled with bipolar cautery.  The vaginal cuff was

then sewn with Stratafix suture in a running fashion and in two layers.  After the

vaginal cuff was closed, the vaginal cuff and surgical sites were copiously

irrigated and suctioned dry.  No areas of bleeding were noted.  The intraabdominal

pressure was dropped down to 6 to 8 mmHg and no areas of bleeding were noted.  The

ureters were identified bilaterally and noted to be well away from the surgical

dissection areas and were both noted to vermiculate at the end of the case.  Clear

urine was noted in the Nelly syringe at the end of the case.  After the

hysterectomy portion was completed, an ON-Q catheter tip was placed through the

abdominal skin and directed under direct visualization.  The catheter was pulled

down into the cul-de-sac and primed and dressed at the abdomen in routine fashion.

After the ON-Q pump was placed, the 12-mm trocar incision was closed with Vicryl

suture.  All four skin incisions were closed with Monocryl suture and dressed with

Dermabond dressing.  Attention was then turned to the rectocele portion of the case. 



A [place] retractor was placed anteriorly for visualization of the rectocele.  The

surgeon was double gloved and a rectal exam noted the fascial defect approximately 5

to 6 cm from the introitus.  The vaginal mucosa over the defect was infiltrated with

lidocaine with epinephrine and then with normal saline for hydrodissection.  An

Allis clamp was placed at the apex of the planned posterior colporrhaphy.  A

15-blade scalpel was then used to incise the length of the planned repair.  Allis

clamps were then placed on the vaginal mucosa.  The fascia below the vaginal mucosa

was then bluntly dissected with a moist Ray-Van as well as sharp dissection with

Sargent scissors.  When dissection of the fascia away from the vaginal mucosa laterally

was completed, after this portion of the colporrhaphy was completed, the native

fascia was reapproximated laterally and the tissue was plicated in the midline.

This was done with a series of 0 Vicryl sutures.  Additional sutures were placed for

hemostasis when needed.  After the plication was completed, the vaginal edges were

trimmed to restore anatomy.  The vaginal mucosal edges were reapproximated and

oversewn in a running locked fashion with Vicryl suture.  After this was completed,

the vaginal mucosa was inspected and no bleeding was noted.  A moist vaginal pack

was placed into the vagina.  The patient was then cleaned, dry, and taken out of

lithotomy position, extubated, and transferred to recovery room in good condition. 







Job ID:  [id number] Yes

## 2024-08-29 NOTE — OCCUPATIONAL THERAPY INITIAL EVALUATION ADULT - ADDITIONAL COMMENTS
Reports having a home health aide 4 hours/day 3 days/week to assist with ADLs (especially bathing) and IADLs. Family assists when aide is not there. Has had about 2 falls in the past 6 months.

## 2024-08-30 LAB
ANION GAP SERPL CALC-SCNC: 6 MMOL/L — LOW (ref 7–14)
BASOPHILS # BLD AUTO: 0.01 K/UL — SIGNIFICANT CHANGE UP (ref 0–0.2)
BASOPHILS NFR BLD AUTO: 0.1 % — SIGNIFICANT CHANGE UP (ref 0–2)
BUN SERPL-MCNC: 10 MG/DL — SIGNIFICANT CHANGE UP (ref 7–23)
CALCIUM SERPL-MCNC: 8 MG/DL — LOW (ref 8.4–10.5)
CHLORIDE SERPL-SCNC: 110 MMOL/L — HIGH (ref 98–107)
CO2 SERPL-SCNC: 22 MMOL/L — SIGNIFICANT CHANGE UP (ref 22–31)
CREAT SERPL-MCNC: 0.66 MG/DL — SIGNIFICANT CHANGE UP (ref 0.5–1.3)
EGFR: 86 ML/MIN/1.73M2 — SIGNIFICANT CHANGE UP
EOSINOPHIL # BLD AUTO: 0.21 K/UL — SIGNIFICANT CHANGE UP (ref 0–0.5)
EOSINOPHIL NFR BLD AUTO: 3.1 % — SIGNIFICANT CHANGE UP (ref 0–6)
GLUCOSE SERPL-MCNC: 118 MG/DL — HIGH (ref 70–99)
HCT VFR BLD CALC: 24.8 % — LOW (ref 34.5–45)
HGB BLD-MCNC: 7.3 G/DL — LOW (ref 11.5–15.5)
IANC: 5.31 K/UL — SIGNIFICANT CHANGE UP (ref 1.8–7.4)
IMM GRANULOCYTES NFR BLD AUTO: 0.4 % — SIGNIFICANT CHANGE UP (ref 0–0.9)
LYMPHOCYTES # BLD AUTO: 0.5 K/UL — LOW (ref 1–3.3)
LYMPHOCYTES # BLD AUTO: 7.5 % — LOW (ref 13–44)
MCHC RBC-ENTMCNC: 24.5 PG — LOW (ref 27–34)
MCHC RBC-ENTMCNC: 29.4 GM/DL — LOW (ref 32–36)
MCV RBC AUTO: 83.2 FL — SIGNIFICANT CHANGE UP (ref 80–100)
MONOCYTES # BLD AUTO: 0.61 K/UL — SIGNIFICANT CHANGE UP (ref 0–0.9)
MONOCYTES NFR BLD AUTO: 9.1 % — SIGNIFICANT CHANGE UP (ref 2–14)
NEUTROPHILS # BLD AUTO: 5.31 K/UL — SIGNIFICANT CHANGE UP (ref 1.8–7.4)
NEUTROPHILS NFR BLD AUTO: 79.8 % — HIGH (ref 43–77)
NRBC # BLD: 0 /100 WBCS — SIGNIFICANT CHANGE UP (ref 0–0)
NRBC # FLD: 0 K/UL — SIGNIFICANT CHANGE UP (ref 0–0)
PLATELET # BLD AUTO: 167 K/UL — SIGNIFICANT CHANGE UP (ref 150–400)
POTASSIUM SERPL-MCNC: 4.2 MMOL/L — SIGNIFICANT CHANGE UP (ref 3.5–5.3)
POTASSIUM SERPL-SCNC: 4.2 MMOL/L — SIGNIFICANT CHANGE UP (ref 3.5–5.3)
RBC # BLD: 2.98 M/UL — LOW (ref 3.8–5.2)
RBC # FLD: 16.4 % — HIGH (ref 10.3–14.5)
SODIUM SERPL-SCNC: 138 MMOL/L — SIGNIFICANT CHANGE UP (ref 135–145)
WBC # BLD: 6.67 K/UL — SIGNIFICANT CHANGE UP (ref 3.8–10.5)
WBC # FLD AUTO: 6.67 K/UL — SIGNIFICANT CHANGE UP (ref 3.8–10.5)

## 2024-08-30 PROCEDURE — 99232 SBSQ HOSP IP/OBS MODERATE 35: CPT

## 2024-08-30 RX ORDER — ONDANSETRON 2 MG/ML
4 INJECTION, SOLUTION INTRAMUSCULAR; INTRAVENOUS EVERY 6 HOURS
Refills: 0 | Status: DISCONTINUED | OUTPATIENT
Start: 2024-08-30 | End: 2024-09-01

## 2024-08-30 RX ORDER — ACETAMINOPHEN 325 MG/1
1000 TABLET ORAL ONCE
Refills: 0 | Status: COMPLETED | OUTPATIENT
Start: 2024-08-30 | End: 2024-08-30

## 2024-08-30 RX ORDER — CYCLOBENZAPRINE HCL 10 MG
10 TABLET ORAL THREE TIMES A DAY
Refills: 0 | Status: DISCONTINUED | OUTPATIENT
Start: 2024-08-30 | End: 2024-09-01

## 2024-08-30 RX ORDER — OXYCODONE HYDROCHLORIDE 5 MG/1
5 TABLET ORAL ONCE
Refills: 0 | Status: DISCONTINUED | OUTPATIENT
Start: 2024-08-30 | End: 2024-08-30

## 2024-08-30 RX ORDER — DIPHENHYDRAMINE HCL 50 MG
25 CAPSULE ORAL ONCE
Refills: 0 | Status: COMPLETED | OUTPATIENT
Start: 2024-08-30 | End: 2024-08-31

## 2024-08-30 RX ADMIN — Medication 1000 UNIT(S): at 11:20

## 2024-08-30 RX ADMIN — Medication 10 MILLIGRAM(S): at 13:48

## 2024-08-30 RX ADMIN — OXYCODONE HYDROCHLORIDE 5 MILLIGRAM(S): 5 TABLET ORAL at 13:22

## 2024-08-30 RX ADMIN — Medication 4 MILLIGRAM(S): at 06:49

## 2024-08-30 RX ADMIN — OXYCODONE HYDROCHLORIDE 5 MILLIGRAM(S): 5 TABLET ORAL at 11:40

## 2024-08-30 RX ADMIN — OXYCODONE HYDROCHLORIDE 5 MILLIGRAM(S): 5 TABLET ORAL at 12:40

## 2024-08-30 RX ADMIN — ACETAMINOPHEN 400 MILLIGRAM(S): 325 TABLET ORAL at 09:40

## 2024-08-30 RX ADMIN — ACETAMINOPHEN 975 MILLIGRAM(S): 325 TABLET ORAL at 02:20

## 2024-08-30 RX ADMIN — ACETAMINOPHEN 975 MILLIGRAM(S): 325 TABLET ORAL at 19:26

## 2024-08-30 RX ADMIN — ACETAMINOPHEN 1000 MILLIGRAM(S): 325 TABLET ORAL at 09:55

## 2024-08-30 RX ADMIN — OXYCODONE HYDROCHLORIDE 10 MILLIGRAM(S): 5 TABLET ORAL at 03:20

## 2024-08-30 RX ADMIN — ACETAMINOPHEN 975 MILLIGRAM(S): 325 TABLET ORAL at 03:20

## 2024-08-30 RX ADMIN — Medication 10 MILLIGRAM(S): at 19:35

## 2024-08-30 RX ADMIN — Medication 150 MICROGRAM(S): at 05:06

## 2024-08-30 RX ADMIN — ACETAMINOPHEN 975 MILLIGRAM(S): 325 TABLET ORAL at 18:26

## 2024-08-30 RX ADMIN — OXYCODONE HYDROCHLORIDE 10 MILLIGRAM(S): 5 TABLET ORAL at 20:09

## 2024-08-30 RX ADMIN — ONDANSETRON 4 MILLIGRAM(S): 2 INJECTION, SOLUTION INTRAMUSCULAR; INTRAVENOUS at 08:07

## 2024-08-30 RX ADMIN — OXYCODONE HYDROCHLORIDE 10 MILLIGRAM(S): 5 TABLET ORAL at 02:20

## 2024-08-30 RX ADMIN — OXYCODONE HYDROCHLORIDE 10 MILLIGRAM(S): 5 TABLET ORAL at 21:09

## 2024-08-30 RX ADMIN — Medication 3 MILLIGRAM(S): at 21:56

## 2024-08-30 RX ADMIN — OXYCODONE HYDROCHLORIDE 5 MILLIGRAM(S): 5 TABLET ORAL at 14:22

## 2024-08-30 RX ADMIN — ENOXAPARIN SODIUM 40 MILLIGRAM(S): 100 INJECTION SUBCUTANEOUS at 18:30

## 2024-08-31 LAB
ANION GAP SERPL CALC-SCNC: 11 MMOL/L — SIGNIFICANT CHANGE UP (ref 7–14)
BLD GP AB SCN SERPL QL: NEGATIVE — SIGNIFICANT CHANGE UP
BUN SERPL-MCNC: 8 MG/DL — SIGNIFICANT CHANGE UP (ref 7–23)
CALCIUM SERPL-MCNC: 8.3 MG/DL — LOW (ref 8.4–10.5)
CHLORIDE SERPL-SCNC: 106 MMOL/L — SIGNIFICANT CHANGE UP (ref 98–107)
CO2 SERPL-SCNC: 22 MMOL/L — SIGNIFICANT CHANGE UP (ref 22–31)
CREAT SERPL-MCNC: 0.63 MG/DL — SIGNIFICANT CHANGE UP (ref 0.5–1.3)
EGFR: 87 ML/MIN/1.73M2 — SIGNIFICANT CHANGE UP
GLUCOSE SERPL-MCNC: 88 MG/DL — SIGNIFICANT CHANGE UP (ref 70–99)
HCT VFR BLD CALC: 25.5 % — LOW (ref 34.5–45)
HGB BLD-MCNC: 7.7 G/DL — LOW (ref 11.5–15.5)
MCHC RBC-ENTMCNC: 24.9 PG — LOW (ref 27–34)
MCHC RBC-ENTMCNC: 30.2 GM/DL — LOW (ref 32–36)
MCV RBC AUTO: 82.5 FL — SIGNIFICANT CHANGE UP (ref 80–100)
NRBC # BLD: 0 /100 WBCS — SIGNIFICANT CHANGE UP (ref 0–0)
NRBC # FLD: 0 K/UL — SIGNIFICANT CHANGE UP (ref 0–0)
PLATELET # BLD AUTO: 191 K/UL — SIGNIFICANT CHANGE UP (ref 150–400)
POTASSIUM SERPL-MCNC: 4.3 MMOL/L — SIGNIFICANT CHANGE UP (ref 3.5–5.3)
POTASSIUM SERPL-SCNC: 4.3 MMOL/L — SIGNIFICANT CHANGE UP (ref 3.5–5.3)
RBC # BLD: 3.09 M/UL — LOW (ref 3.8–5.2)
RBC # FLD: 16 % — HIGH (ref 10.3–14.5)
RH IG SCN BLD-IMP: POSITIVE — SIGNIFICANT CHANGE UP
SODIUM SERPL-SCNC: 139 MMOL/L — SIGNIFICANT CHANGE UP (ref 135–145)
WBC # BLD: 5.79 K/UL — SIGNIFICANT CHANGE UP (ref 3.8–10.5)
WBC # FLD AUTO: 5.79 K/UL — SIGNIFICANT CHANGE UP (ref 3.8–10.5)

## 2024-08-31 PROCEDURE — 99232 SBSQ HOSP IP/OBS MODERATE 35: CPT

## 2024-08-31 RX ORDER — CYCLOBENZAPRINE HCL 10 MG
1 TABLET ORAL
Qty: 0 | Refills: 0 | DISCHARGE
Start: 2024-08-31

## 2024-08-31 RX ORDER — TRAMADOL HYDROCHLORIDE 200 MG/1
50 TABLET, EXTENDED RELEASE ORAL EVERY 6 HOURS
Refills: 0 | Status: DISCONTINUED | OUTPATIENT
Start: 2024-08-31 | End: 2024-09-01

## 2024-08-31 RX ORDER — OXYCODONE HYDROCHLORIDE 5 MG/1
1 TABLET ORAL
Qty: 0 | Refills: 0 | DISCHARGE
Start: 2024-08-31

## 2024-08-31 RX ORDER — FOLIC ACID/MULTIVIT,IRON,MINER 0.4MG-18MG
1000 TABLET,CHEWABLE ORAL
Qty: 0 | Refills: 0 | DISCHARGE
Start: 2024-08-31

## 2024-08-31 RX ORDER — ENOXAPARIN SODIUM 100 MG/ML
40 INJECTION SUBCUTANEOUS
Qty: 0 | Refills: 0 | DISCHARGE
Start: 2024-08-31

## 2024-08-31 RX ORDER — ONDANSETRON 2 MG/ML
1 INJECTION, SOLUTION INTRAMUSCULAR; INTRAVENOUS
Qty: 0 | Refills: 0 | DISCHARGE
Start: 2024-08-31

## 2024-08-31 RX ORDER — TRAMADOL HYDROCHLORIDE 200 MG/1
1 TABLET, EXTENDED RELEASE ORAL
Qty: 0 | Refills: 0 | DISCHARGE
Start: 2024-08-31

## 2024-08-31 RX ORDER — ACETAMINOPHEN 325 MG/1
3 TABLET ORAL
Qty: 0 | Refills: 0 | DISCHARGE
Start: 2024-08-31

## 2024-08-31 RX ADMIN — OXYCODONE HYDROCHLORIDE 5 MILLIGRAM(S): 5 TABLET ORAL at 19:46

## 2024-08-31 RX ADMIN — TRAMADOL HYDROCHLORIDE 50 MILLIGRAM(S): 200 TABLET, EXTENDED RELEASE ORAL at 09:05

## 2024-08-31 RX ADMIN — OXYCODONE HYDROCHLORIDE 5 MILLIGRAM(S): 5 TABLET ORAL at 02:08

## 2024-08-31 RX ADMIN — ACETAMINOPHEN 975 MILLIGRAM(S): 325 TABLET ORAL at 11:00

## 2024-08-31 RX ADMIN — TRAMADOL HYDROCHLORIDE 50 MILLIGRAM(S): 200 TABLET, EXTENDED RELEASE ORAL at 09:45

## 2024-08-31 RX ADMIN — Medication 150 MICROGRAM(S): at 06:16

## 2024-08-31 RX ADMIN — ENOXAPARIN SODIUM 40 MILLIGRAM(S): 100 INJECTION SUBCUTANEOUS at 18:08

## 2024-08-31 RX ADMIN — OXYCODONE HYDROCHLORIDE 5 MILLIGRAM(S): 5 TABLET ORAL at 09:02

## 2024-08-31 RX ADMIN — OXYCODONE HYDROCHLORIDE 5 MILLIGRAM(S): 5 TABLET ORAL at 12:17

## 2024-08-31 RX ADMIN — OXYCODONE HYDROCHLORIDE 5 MILLIGRAM(S): 5 TABLET ORAL at 19:13

## 2024-08-31 RX ADMIN — TRAMADOL HYDROCHLORIDE 50 MILLIGRAM(S): 200 TABLET, EXTENDED RELEASE ORAL at 16:47

## 2024-08-31 RX ADMIN — OXYCODONE HYDROCHLORIDE 5 MILLIGRAM(S): 5 TABLET ORAL at 08:14

## 2024-08-31 RX ADMIN — ACETAMINOPHEN 975 MILLIGRAM(S): 325 TABLET ORAL at 18:08

## 2024-08-31 RX ADMIN — Medication 10 MILLIGRAM(S): at 06:13

## 2024-08-31 RX ADMIN — ACETAMINOPHEN 975 MILLIGRAM(S): 325 TABLET ORAL at 10:14

## 2024-08-31 RX ADMIN — LOSARTAN POTASSIUM 100 MILLIGRAM(S): 50 TABLET ORAL at 07:30

## 2024-08-31 RX ADMIN — Medication 4 MILLIGRAM(S): at 07:31

## 2024-08-31 RX ADMIN — OXYCODONE HYDROCHLORIDE 5 MILLIGRAM(S): 5 TABLET ORAL at 01:08

## 2024-08-31 RX ADMIN — Medication 3 MILLIGRAM(S): at 21:23

## 2024-08-31 RX ADMIN — Medication 1000 UNIT(S): at 12:01

## 2024-08-31 RX ADMIN — Medication 25 MILLIGRAM(S): at 03:53

## 2024-08-31 RX ADMIN — OXYCODONE HYDROCHLORIDE 5 MILLIGRAM(S): 5 TABLET ORAL at 15:10

## 2024-08-31 RX ADMIN — Medication 10 MILLIGRAM(S): at 19:13

## 2024-08-31 RX ADMIN — ACETAMINOPHEN 975 MILLIGRAM(S): 325 TABLET ORAL at 18:50

## 2024-08-31 RX ADMIN — TRAMADOL HYDROCHLORIDE 50 MILLIGRAM(S): 200 TABLET, EXTENDED RELEASE ORAL at 16:12

## 2024-08-31 NOTE — PROGRESS NOTE ADULT - TIME BILLING
- Review of records, telemetry, vital signs and daily labs.   - General and cardiovascular physical examination.  - Generation of cardiovascular treatment plan and completion of note .  - Coordination of care.      Patient was seen and examined by me on  08/29/2024 ,interim events noted,labs and radiology studies reviewed.  Anthony Orellana MD,FACC.  [address] [id number].  [phone]
- Review of records, telemetry, vital signs and daily labs.   - General and cardiovascular physical examination.  - Generation of cardiovascular treatment plan and completion of note .  - Coordination of care.      Patient was seen and examined by me on  8/31/24,interim events noted,labs and radiology studies reviewed.  Anthoyn Orellana MD,FACC.  [id number] [id number].  [phone]
Time spent includes direct patient care  (interview and examination of patient), discussion with other providers, support staff and/or patient's family members, review of medical records, ordering diagnostic tests and analyzing results, and documentation. Time spent was exclusive of teaching residents.
- Review of records, telemetry, vital signs and daily labs.   - General and cardiovascular physical examination.  - Generation of cardiovascular treatment plan and completion of note .  - Coordination of care.      Patient was seen and examined by me on  08/30/2024 ,interim events noted,labs and radiology studies reviewed.  Anthony Orellana MD,FACC.  [address] [id number].  [phone]

## 2024-09-01 VITALS
SYSTOLIC BLOOD PRESSURE: 146 MMHG | OXYGEN SATURATION: 100 % | DIASTOLIC BLOOD PRESSURE: 69 MMHG | TEMPERATURE: 97 F | HEART RATE: 83 BPM | RESPIRATION RATE: 18 BRPM

## 2024-09-01 LAB
HCT VFR BLD CALC: 24.5 % — LOW (ref 34.5–45)
HGB BLD-MCNC: 7.3 G/DL — LOW (ref 11.5–15.5)
MCHC RBC-ENTMCNC: 24.4 PG — LOW (ref 27–34)
MCHC RBC-ENTMCNC: 29.8 GM/DL — LOW (ref 32–36)
MCV RBC AUTO: 81.9 FL — SIGNIFICANT CHANGE UP (ref 80–100)
NRBC # BLD: 0 /100 WBCS — SIGNIFICANT CHANGE UP (ref 0–0)
NRBC # FLD: 0 K/UL — SIGNIFICANT CHANGE UP (ref 0–0)
PLATELET # BLD AUTO: 207 K/UL — SIGNIFICANT CHANGE UP (ref 150–400)
RBC # BLD: 2.99 M/UL — LOW (ref 3.8–5.2)
RBC # FLD: 16 % — HIGH (ref 10.3–14.5)
WBC # BLD: 4.93 K/UL — SIGNIFICANT CHANGE UP (ref 3.8–10.5)
WBC # FLD AUTO: 4.93 K/UL — SIGNIFICANT CHANGE UP (ref 3.8–10.5)

## 2024-09-01 PROCEDURE — 99232 SBSQ HOSP IP/OBS MODERATE 35: CPT

## 2024-09-01 RX ORDER — DIPHENHYDRAMINE HCL 50 MG
25 CAPSULE ORAL ONCE
Refills: 0 | Status: COMPLETED | OUTPATIENT
Start: 2024-09-01 | End: 2024-09-01

## 2024-09-01 RX ORDER — NALOXONE HCL 1 MG/ML
1 VIAL (ML) INJECTION
Qty: 1 | Refills: 0
Start: 2024-09-01

## 2024-09-01 RX ADMIN — Medication 10 MILLIGRAM(S): at 10:04

## 2024-09-01 RX ADMIN — TRAMADOL HYDROCHLORIDE 50 MILLIGRAM(S): 200 TABLET, EXTENDED RELEASE ORAL at 14:33

## 2024-09-01 RX ADMIN — TRAMADOL HYDROCHLORIDE 50 MILLIGRAM(S): 200 TABLET, EXTENDED RELEASE ORAL at 09:02

## 2024-09-01 RX ADMIN — OXYCODONE HYDROCHLORIDE 5 MILLIGRAM(S): 5 TABLET ORAL at 03:51

## 2024-09-01 RX ADMIN — LOSARTAN POTASSIUM 100 MILLIGRAM(S): 50 TABLET ORAL at 07:51

## 2024-09-01 RX ADMIN — TRAMADOL HYDROCHLORIDE 50 MILLIGRAM(S): 200 TABLET, EXTENDED RELEASE ORAL at 08:02

## 2024-09-01 RX ADMIN — Medication 1000 UNIT(S): at 13:22

## 2024-09-01 RX ADMIN — TRAMADOL HYDROCHLORIDE 50 MILLIGRAM(S): 200 TABLET, EXTENDED RELEASE ORAL at 01:07

## 2024-09-01 RX ADMIN — Medication 150 MICROGRAM(S): at 06:23

## 2024-09-01 RX ADMIN — TRAMADOL HYDROCHLORIDE 50 MILLIGRAM(S): 200 TABLET, EXTENDED RELEASE ORAL at 00:07

## 2024-09-01 RX ADMIN — Medication 25 MILLIGRAM(S): at 02:15

## 2024-09-01 RX ADMIN — OXYCODONE HYDROCHLORIDE 5 MILLIGRAM(S): 5 TABLET ORAL at 04:50

## 2024-09-01 RX ADMIN — TRAMADOL HYDROCHLORIDE 50 MILLIGRAM(S): 200 TABLET, EXTENDED RELEASE ORAL at 15:22

## 2024-09-01 RX ADMIN — Medication 4 MILLIGRAM(S): at 07:52

## 2024-09-01 NOTE — PROGRESS NOTE ADULT - PROVIDER SPECIALTY LIST ADULT
Anesthesia
Cardiology
Orthopedics
Cardiology
Cardiology
Orthopedics
Orthopedics
Hospitalist

## 2024-09-01 NOTE — PROGRESS NOTE ADULT - ASSESSMENT
83yFemale s/p L IMN DOS 8/28/2024; L  fracture s/p CR. Recovering appropriately.     - Pain control  - WBAT LLE, NWB HEATHERE in sugart[place] splint  - mechanical/DVT ppx (lovenox 40)  - OOB/PT  - dispo planning pending PT eval
83yFemale s/p L IMN 8/28 and L  fracture s/p closed reduction    Plan:  - Pain control  - WBAT LLE, MELY PEREYRA in [facility] splint  - lovenox 40  - OT/PT  - dispo: RANDY  
83yFemale s/p L IMN 8/28 and L DR aragon s/p closed reduction    Plan:  - Pain control  - WBAT LLE, NWB LUE in su[place] splint  - lovenox 40  - OT/PT  - dispo: RANDY transport arriving at 3pm    For all questions related to patient care, please reach out to the on-call team via the pager.     Erica Jerez, PGY 3  Orthopaedic Surgery  [facility] [id number]  [facility] [id number]  [facility] [phone]  
83-year-old female with past medical history of carotid stenosis on Plavix, HTN, HLD, osteoarthritis (does follow with rheumatology currently on prednisone taper), prior mandibular CA with reconstruction, upper extremity neuropathy who presented to the ER c/o L hip and L wrist pain s/p mechanical fall. Pt states she fell tripped and fell in her kitchen, hitting her wrist on the table before landing on her left side.    # FALL-LEFT IT fracture   -Mechanical fall no LOC  - s/p L r IM Nailing/Hemiarthroplasty 8/28  -PT eval for d/c planning   
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 Found to have left intertrochanteric hip fracture and a left distal radius fracture.

## 2024-09-01 NOTE — PROGRESS NOTE ADULT - PROBLEM SELECTOR PLAN 10
dvt ppx: lovenox  diet as tolerated.

## 2024-09-01 NOTE — PROGRESS NOTE ADULT - SUBJECTIVE AND OBJECTIVE BOX
Orthopedic Progress Note     S:  No acute events overnight, pain is well controlled.  Patient denies any chest pain, SOB, N/V, fevers/chills.    T(C): 37.3 (08-29-24 @ 05:26), Max: 37.3 (08-29-24 @ 05:26)  HR: 93 (08-29-24 @ 05:26) (63 - 93)  BP: 119/56 (08-29-24 @ 05:26) (105/50 - 148/73)  RR: 17 (08-29-24 @ 05:26) (13 - 20)  SpO2: 98% (08-29-24 @ 05:26) (95% - 100%)  Wt(kg): --I&O's Summary    28 Aug 2024 07:01  -  29 Aug 2024 07:00  --------------------------------------------------------  IN: 495 mL / OUT: 700 mL / NET: -205 mL        O:  Physical Exam:  Gen: NAD, alert and oriented  Resp: Unlabored breathing  LLE: Skin intact, no ecchymosis, dressing c/d/i       SILT DP/SP/ Rebecca/Saph/tib       +EHL/FHL/TA/Gastroc,        DP+,        soft compartments, no calf ttp  LUE: splint in place, fingers with mild edema      Grossly moving all fingers, SILT      Cap refill < 2 sec, [facility]          Labs:                        9.2    8.67  )-----------( 199      ( 28 Aug 2024 14:45 )             30.6    08-28    141  |  110<H>  |  14  ----------------------------<  111<H>  3.9   |  20<L>  |  0.78    Ca    7.6<L>      28 Aug 2024 14:45    TPro  6.0  /  Alb  3.7  /  TBili  0.3  /  DBili  x   /  AST  20  /  ALT  17  /  AlkPhos  49  08-28      
Orthopedic Surgery Progress Note     S: Patient seen and examined today. No acute events overnight, complaining of pain "all over". Denies f/c, chest pain, shortness of breath, dizziness.    MEDICATIONS  (STANDING):  acetaminophen     Tablet .. 975 milliGRAM(s) Oral every 8 hours  atorvastatin 20 milliGRAM(s) Oral at bedtime  cholecalciferol 1000 Unit(s) Oral daily  enoxaparin Injectable 40 milliGRAM(s) SubCutaneous every 24 hours  levothyroxine 150 MICROGram(s) Oral daily  losartan 100 milliGRAM(s) Oral daily  povidone iodine 10% Nasal Swab 1 Application(s) Both Nostrils once  predniSONE   Tablet 3 milliGRAM(s) Oral at bedtime  predniSONE   Tablet 4 milliGRAM(s) Oral daily    MEDICATIONS  (PRN):  cyclobenzaprine 10 milliGRAM(s) Oral three times a day PRN Muscle Spasm  HYDROmorphone  Injectable 1 milliGRAM(s) IV Push every 6 hours PRN Moderate Pain (4 - 6)  ondansetron    Tablet 4 milliGRAM(s) Oral every 6 hours PRN Nausea and/or Vomiting  oxyCODONE    IR 5 milliGRAM(s) Oral every 4 hours PRN Moderate Pain (4 - 6)  oxyCODONE    IR 10 milliGRAM(s) Oral every 4 hours PRN Severe Pain (7 - 10)      Vital Signs Last 24 Hrs  T(C): 36.4 (31 Aug 2024 06:10), Max: 36.7 (30 Aug 2024 09:36)  T(F): 97.5 (31 Aug 2024 06:10), Max: 98.1 (30 Aug 2024 09:36)  HR: 87 (31 Aug 2024 06:10) (76 - 95)  BP: 149/59 (31 Aug 2024 06:10) (125/52 - 149/59)  BP(mean): --  RR: 18 (31 Aug 2024 06:10) (17 - 18)  SpO2: 96% (31 Aug 2024 06:10) (94% - 98%)    Parameters below as of 31 Aug 2024 06:10  Patient On (Oxygen Delivery Method): nasal cannula  O2 Flow (L/min): 2      08-30-24 @ 07:01  -  08-31-24 @ 07:00  --------------------------------------------------------  IN: 0 mL / OUT: 400 mL / NET: -400 mL        Physical Exam:  Gen: NAD, alert and oriented  Resp: Unlabored breathing  LLE: Skin intact, no ecchymosis, dressing c/d/i       SILT DP/SP/ Rebecca/Saph/tib       +EHL/FHL/TA/Gastroc,        DP+,        soft compartments, no calf ttp  LUE: splint in place, fingers with mild edema      Grossly moving all fingers, SILT      Cap refill < 2 sec, [facility]    LABS:                        7.7    5.79  )-----------( 191      ( 31 Aug 2024 06:39 )             25.5     08-31    139  |  106  |  8   ----------------------------<  88  4.3   |  22  |  0.63    Ca    8.3<L>      31 Aug 2024 06:39    
  ORTHOPEDIC PROGRESS NOTE    Overnight events: None    SUBJECTIVE: Pt seen and examined at bedside. Patient is doing well, no acute complaints this AM. Pain is better controlled with tramadol      OBJECTIVE:  Vital Signs Last 24 Hrs  T(C): 36.5 (31 Aug 2024 21:35), Max: 37.1 (31 Aug 2024 09:23)  T(F): 97.7 (31 Aug 2024 21:35), Max: 98.7 (31 Aug 2024 09:23)  HR: 74 (31 Aug 2024 21:35) (74 - 92)  BP: 136/96 (31 Aug 2024 21:35) (100/55 - 149/59)  BP(mean): --  RR: 17 (31 Aug 2024 21:35) (17 - 18)  SpO2: 100% (31 Aug 2024 21:35) (95% - 100%)    Parameters below as of 31 Aug 2024 21:35  Patient On (Oxygen Delivery Method): nasal cannula  O2 Flow (L/min): 2        08-30-24 @ 07:01  -  08-31-24 @ 07:00  --------------------------------------------------------  IN: 0 mL / OUT: 400 mL / NET: -400 mL    08-31-24 @ 07:01  -  09-01-24 @ 05:16  --------------------------------------------------------  IN: 0 mL / OUT: 650 mL / NET: -650 mL        Physical Examination:  GEN: NAD, resting quietly  PULM: symmetric chest rise bilaterally, no increased WOB  ABD: nondistended  EXTR:   LLE: Skin intact, no ecchymosis, dressing c/d/i       SILT DP/SP/ Rebecca/Saph/tib       +EHL/FHL/TA/Gastroc,        DP+,        soft compartments, no calf ttp  LUE: splint in place, fingers with mild edema      Grossly moving all fingers, SILT      Cap refill < 2 sec, [facility]      LABS:                        7.7    5.79  )-----------( 191      ( 31 Aug 2024 06:39 )             25.5       08-31    139  |  106  |  8   ----------------------------<  88  4.3   |  22  |  0.63    Ca    8.3<L>      31 Aug 2024 06:39        
ANESTHESIA POSTOP CHECK    83y Female POSTOP DAY 1     Vital Signs Last 24 Hrs  T(C): 36.7 (29 Aug 2024 09:51), Max: 37.3 (29 Aug 2024 05:26)  T(F): 98.1 (29 Aug 2024 09:51), Max: 99.1 (29 Aug 2024 05:26)  HR: 89 (29 Aug 2024 09:51) (63 - 93)  BP: 103/57 (29 Aug 2024 09:51) (103/57 - 131/54)  BP(mean): 95 (28 Aug 2024 17:00) (62 - 95)  RR: 17 (29 Aug 2024 09:51) (13 - 20)  SpO2: 98% (29 Aug 2024 09:51) (95% - 100%)    Parameters below as of 29 Aug 2024 09:51  Patient On (Oxygen Delivery Method): nasal cannula  O2 Flow (L/min): 2    I&O's Summary    28 Aug 2024 07:01  -  29 Aug 2024 07:00  --------------------------------------------------------  IN: 495 mL / OUT: 700 mL / NET: -205 mL        [X ] NO APPARENT ANESTHESIA COMPLICATIONS      Comments: 
Pt seen/examined. Doing well. Pain controlled.     T(C): 36.7 (08-28-24 @ 15:00), Max: 36.8 (08-28-24 @ 01:50)  HR: 73 (08-28-24 @ 15:45) (63 - 81)  BP: 122/53 (08-28-24 @ 15:45) (105/50 - 170/62)  RR: 19 (08-28-24 @ 15:45) (13 - 20)  SpO2: 95% (08-28-24 @ 15:45) (95% - 100%)  Wt(kg): --  - Gen: NAD    PE  Gen: NAD, alert and oriented  Resp: Unlabored breathing  LLE: Skin intact, no ecchymosis, dressing c/d/i       SILT DP/SP/ Rebecca/Saph/tib       +EHL/FHL/TA/Gastroc,        DP+,        soft compartments, no calf ttp         83yFemale s/p L IMN. Recovering appropriately.     - Pain control  - WBAT LLE  - mechanical/DVT ppx (lovenox 40)  - OOB/PT  - dispo planning
ORTHO PROGRESS NOTE     Pt seen and examined at bedside, denies SOB, CP, Dizziness. N/V/D /HA.  No significant overnight events. Pain well controlled. Patient ambulated  with PT     Vital Signs Last 24 Hrs  T(C): 37 (29 Aug 2024 21:36), Max: 37 (29 Aug 2024 21:36)  T(F): 98.6 (29 Aug 2024 21:36), Max: 98.6 (29 Aug 2024 21:36)  HR: 77 (29 Aug 2024 21:36) (77 - 97)  BP: 134/56 (29 Aug 2024 21:36) (103/57 - 145/97)  BP(mean): --  RR: 17 (29 Aug 2024 21:36) (17 - 17)  SpO2: 100% (29 Aug 2024 21:36) (97% - 100%)    Parameters below as of 29 Aug 2024 21:36  Patient On (Oxygen Delivery Method): nasal cannula  O2 Flow (L/min): 2      Gen: No apparent distress    left LE:  Dressing C/D I    BLE: motor intact EHL/FHL/TA/GS .  Sensation is grossly intact to light touch in the distal extremities.  Compartments are soft bilaterally.  Extremities are warm .  DP 2+ BLE     Labs:  CBC Full  -  ( 29 Aug 2024 09:08 )  WBC Count : 7.89 K/uL  RBC Count : 3.32 M/uL  Hemoglobin : 8.1 g/dL  Hematocrit : 27.0 %  Platelet Count - Automated : 189 K/uL  Mean Cell Volume : 81.3 fL  Mean Cell Hemoglobin : 24.4 pg  Mean Cell Hemoglobin Concentration : 30.0 gm/dL  Auto Neutrophil # : x  Auto Lymphocyte # : x  Auto Monocyte # : x  Auto Eosinophil # : x  Auto Basophil # : x  Auto Neutrophil % : x  Auto Lymphocyte % : x  Auto Monocyte % : x  Auto Eosinophil % : x  Auto Basophil % : x        08-29    139  |  109<H>  |  12  ----------------------------<  98  4.2   |  19<L>  |  0.77    Ca    6.8<L>      29 Aug 2024 06:15    TPro  x   /  Alb  2.7<L>  /  TBili  x   /  DBili  x   /  AST  x   /  ALT  x   /  AlkPhos  x   08-29         A/P  s/p left hip IMN POD #2    - Pain control/ Analgesia  - DVT ppx Lovenox, foot pumps  - PT/OT - wbat/oob    - Incentive Spirometer  - Rehab planning   - notify Ortho for questions 
PATIENT SEEN AND EXAMINED BY ISELA LOPEZ M.D. ON :- 8/30/24  DATE OF SERVICE:      8/30/24      Interim events noted,Labs ,Radiological studies and Cardiology tests reviewed .    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 Pt states she fell tripped and fell in her kitchen, hitting her wrist on the table before landing on her left side. Unable to bear weight in the LLE since the fall. Denies headstrike or LOC. Denies numbness/tingling in the LLE. Denies any other trauma/injuries at this time. At baseline, community ambulator w/o assistive devices.    ROS  Negative unless otherwise specified in HPI.    PAST MEDICAL & SURGICAL Hx  PAST MEDICAL & SURGICAL HISTORY:  Hypothyroid      Hyperlipidemia      Oral Cancer      Basal Cell Cancer  biopsy of nose  5/2010      Malignant neoplasm of mandible  left- 2008      Malignant neoplasm of tongue      Radiation therapy complication  burn to left neck area      History of radiation therapy  2008      Radiation dermatitis  left neck      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      Hardware complicating wound infection  S/P removal of hardware 6/2008      S/P biopsy  tongue lesion 7/2014      H/O right breast biopsy  1980's      History of cataract  left and right ; 6/2017, 12/2017          MEDICATIONS  Home Medications:  acetaminophen 325 mg oral tablet: 2 tab(s) orally every 6 hours As needed Temp greater or equal to 38C (100.4F), Mild Pain (1 - 3) (30 Nov 2023 15:56)  clopidogrel 75 mg oral tablet: 1 tab(s) orally (26 Nov 2023 09:54)  losartan 100 mg oral tablet: 1 tab(s) orally (26 Nov 2023 09:54)  predniSONE 1 mg oral tablet: 4 tab(s) orally once a day everyday at 6 pm taper as per RA (30 Nov 2023 15:56)  predniSONE 5 mg oral tablet: 1 tab(s) orally once a day every day at 6 am, taper as per RA (30 Nov 2023 15:56)  pregabalin 200 mg oral capsule: 1 cap(s) orally once a day (at bedtime) (26 Nov 2023 09:55)  simvastatin 40 mg oral tablet: 1 tab(s) orally (26 Nov 2023 09:55)  Synthroid 125 mcg (0.125 mg) oral tablet: 1 tab(s) orally once a day in am (26 Nov 2023 09:56)      ALLERGIES  No Known Allergies      FAMILY Hx  FAMILY HISTORY:  Family history of MI (myocardial infarction) (Father)        SOCIAL Hx  Social History:      VITALS  Vital Signs Last 24 Hrs  T(C): 36.6 (28 Aug 2024 05:40), Max: 36.8 (28 Aug 2024 01:50)  T(F): 97.9 (28 Aug 2024 05:40), Max: 98.2 (28 Aug 2024 01:50)  HR: 81 (28 Aug 2024 05:40) (70 - 81)  BP: 152/85 (28 Aug 2024 05:40) (152/85 - 170/62)  BP(mean): --  RR: 18 (28 Aug 2024 05:40) (18 - 18)  SpO2: 99% (28 Aug 2024 05:40) (98% - 99%)    Parameters below as of 28 Aug 2024 05:40  Patient On (Oxygen Delivery Method): room air        PHYSICAL EXAM  Gen: Lying in bed, NAD  Resp: No increased WOB  LUE  Skin intact, +edema +ecchymosis over wrist  +TTP over wrist  Limited ROM 2/2 pain  NVI  SILT  LLE:  Skin intact, shortened and externally rotated, +edema and +ecchymosis over L hip  +TTP over L hip, no TTP along remainder of extremity; compartments soft  Limited ROM at hip 2/2 pain  +Log roll test  +Pain with axial loading  Motor: TA/EHL/GS/FHL intact  Sensory: DP/SP/Tib/Rebecca/Saph SILT  +DP pulse, WWP    Secondary survey:  No TTP along spine or other extremities, pelvis grossly stable, SILT and compartments soft throughout    LABS                        10.5   10.56 )-----------( 249      ( 28 Aug 2024 04:06 )             35.0     08-28    143  |  108<H>  |  21  ----------------------------<  96  3.9   |  21<L>  |  0.81    Ca    8.4      28 Aug 2024 04:06    TPro  6.0  /  Alb  3.7  /  TBili  0.3  /  DBili  x   /  AST  20  /  ALT  17  /  AlkPhos  49  08-28    PT/INR - ( 28 Aug 2024 04:06 )   PT: 11.0 sec;   INR: 0.98 ratio         PTT - ( 28 Aug 2024 04:06 )  PTT:27.8 sec    IMAGING  XRs: L IT fracture     L distal radius fracture and ulnar styloid fracture     ASSESSMENT & PLAN  83yFemale w/ L IT fx.  -NWB LLE, bedrest  [facility]  -[facility]   -f/u preop: CBC, BMP, coags, T&S x2, CXR, EKG  -NPO IVF  -hold chemical DVT ppx for OR; SCDs OK  -please document medical clearance ASAP for OR  -pain control  -ice/cold compress     (28 Aug 2024 07:54)      PAST MEDICAL & SURGICAL HISTORY:  Hypothyroid      Hyperlipidemia      Oral Cancer      Basal Cell Cancer  biopsy of nose  5/2010      Malignant neoplasm of mandible  left- 2008      Malignant neoplasm of tongue      Radiation therapy complication  burn to left neck area      History of radiation therapy  2008      Radiation dermatitis  left neck      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      Hardware complicating wound infection  S/P removal of hardware 6/2008      S/P biopsy  tongue lesion 7/2014      H/O right breast biopsy  1980's      History of cataract  left and right ; 6/2017, 12/2017          PREVIOUS DIAGNOSTIC TESTING:      ECHO  FINDINGS:    STRESS  FINDINGS:    CATHETERIZATION  FINDINGS:    MEDICATIONS  (STANDING):  acetaminophen     Tablet .. 975 milliGRAM(s) Oral every 8 hours  atorvastatin 20 milliGRAM(s) Oral at bedtime  cholecalciferol 1000 Unit(s) Oral daily  enoxaparin Injectable 40 milliGRAM(s) SubCutaneous every 24 hours  levothyroxine 150 MICROGram(s) Oral daily  losartan 100 milliGRAM(s) Oral daily  povidone iodine 10% Nasal Swab 1 Application(s) Both Nostrils once  predniSONE   Tablet 3 milliGRAM(s) Oral at bedtime  predniSONE   Tablet 4 milliGRAM(s) Oral daily  pregabalin 200 milliGRAM(s) Oral at bedtime    MEDICATIONS  (PRN):  cyclobenzaprine 10 milliGRAM(s) Oral three times a day PRN Muscle Spasm  diphenhydrAMINE 25 milliGRAM(s) Oral once PRN Rash and/or Itching  HYDROmorphone  Injectable 1 milliGRAM(s) IV Push every 6 hours PRN Moderate Pain (4 - 6)  ondansetron    Tablet 4 milliGRAM(s) Oral every 6 hours PRN Nausea and/or Vomiting  oxyCODONE    IR 5 milliGRAM(s) Oral every 4 hours PRN Moderate Pain (4 - 6)  oxyCODONE    IR 10 milliGRAM(s) Oral every 4 hours PRN Severe Pain (7 - 10)      FAMILY HISTORY:  Family history of MI (myocardial infarction) (Father)        SOCIAL HISTORY:    CIGARETTES:    ALCOHOL:    REVIEW OF SYSTEMS:  CONSTITUTIONAL: No fever, weight loss, or fatigue  EYES: No eye pain, visual disturbances, or discharge  ENMT:  No difficulty hearing, tinnitus, vertigo; No sinus or throat pain  NECK: No pain or stiffness  RESPIRATORY: No cough, wheezing, chills or hemoptysis; No shortness of breath  CARDIOVASCULAR: No chest pain, palpitations, dizziness, or leg swelling  GASTROINTESTINAL: No abdominal or epigastric pain. No nausea, vomiting, or hematemesis; No diarrhea or constipation. No melena or hematochezia.  GENITOURINARY: No dysuria, frequency, hematuria, or incontinence  NEUROLOGICAL: No headaches, memory loss, loss of strength, numbness, or tremors  SKIN: No itching, burning, rashes, or lesions   LYMPH NODES: No enlarged glands  ENDOCRINE: No heat or cold intolerance; No hair loss  MUSCULOSKELETAL: No joint pain or swelling; No muscle, back, or extremity pain  PSYCHIATRIC: No depression, anxiety, mood swings, or difficulty sleeping  HEME/LYMPH: No easy bruising, or bleeding gums  ALLERY AND IMMUNOLOGIC: No hives or eczema    Vital Signs Last 24 Hrs  T(C): 36.7 (30 Aug 2024 18:13), Max: 36.7 (30 Aug 2024 09:36)  T(F): 98.1 (30 Aug 2024 18:13), Max: 98.1 (30 Aug 2024 09:36)  HR: 95 (30 Aug 2024 18:13) (76 - 95)  BP: 138/58 (30 Aug 2024 18:13) (125/52 - 149/53)  BP(mean): --  RR: 17 (30 Aug 2024 18:13) (16 - 18)  SpO2: 94% (30 Aug 2024 18:13) (94% - 97%)    Parameters below as of 30 Aug 2024 18:13  Patient On (Oxygen Delivery Method): nasal cannula  O2 Flow (L/min): 2        PHYSICAL EXAM:  GENERAL: NAD, well-groomed, well-developed  HEAD:  Atraumatic, Normocephalic  EYES: EOMI, PERRLA, conjunctiva and sclera clear  ENMT: No tonsillar erythema, exudates, or enlargement; Moist mucous membranes, Good dentition, No lesions  NECK: Supple, No JVD, Normal thyroid  NERVOUS SYSTEM:  Alert & Oriented X3, Good concentration; Motor Strength 5/5 B/L upper and lower extremities; DTRs 2+ intact and symmetric  CHEST/LUNG: Clear to percussion bilaterally; No rales, rhonchi, wheezing, or rubs  HEART: Regular rate and rhythm; No murmurs, rubs, or gallops  ABDOMEN: Soft, Nontender, Nondistended; Bowel sounds present  EXTREMITIES:  2+ Peripheral Pulses, No clubbing, cyanosis, or edema  LYMPH: No lymphadenopathy noted  SKIN: No rashes or lesions      INTERPRETATION OF TELEMETRY:    [facility]    [facility]     LABS:                        7.3    6.67  )-----------( 167      ( 30 Aug 2024 06:38 )             24.8     08-30    138  |  110<H>  |  10  ----------------------------<  118<H>  4.2   |  22  |  0.66    Ca    8.0<L>      30 Aug 2024 06:38    TPro  x   /  Alb  2.7<L>  /  TBili  x   /  DBili  x   /  AST  x   /  ALT  x   /  AlkPhos  x   08-29          Urinalysis Basic - ( 30 Aug 2024 06:38 )    Color: x / Appearance: x / SG: x / pH: x  Gluc: 118 mg/dL / Ketone: x  / Bili: x / Urobili: x   Blood: x / Protein: x / Nitrite: x   Leuk Esterase: x / RBC: x / WBC x   Sq Epi: x / Non Sq Epi: x / Bacteria: x      Lipid Panel:   I&O's Summary    29 Aug 2024 07:01  -  30 Aug 2024 07:00  --------------------------------------------------------  IN: 0 mL / OUT: 900 mL / NET: -900 mL    30 Aug 2024 07:01  -  30 Aug 2024 21:58  --------------------------------------------------------  IN: 0 mL / OUT: 400 mL / NET: -400 mL        RADIOLOGY & ADDITIONAL STUDIES:    
PATIENT SEEN AND EXAMINED BY ISELA LOPEZ M.D. ON :- 8/31/24  DATE OF SERVICE:   8/31/24          Interim events noted,Labs ,Radiological studies and Cardiology tests reviewed .    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 Pt states she fell tripped and fell in her kitchen, hitting her wrist on the table before landing on her left side. Unable to bear weight in the LLE since the fall. Denies headstrike or LOC. Denies numbness/tingling in the LLE. Denies any other trauma/injuries at this time. At baseline, community ambulator w/o assistive devices.    ROS  Negative unless otherwise specified in HPI.    PAST MEDICAL & SURGICAL Hx  PAST MEDICAL & SURGICAL HISTORY:  Hypothyroid      Hyperlipidemia      Oral Cancer      Basal Cell Cancer  biopsy of nose  5/2010      Malignant neoplasm of mandible  left- 2008      Malignant neoplasm of tongue      Radiation therapy complication  burn to left neck area      History of radiation therapy  2008      Radiation dermatitis  left neck      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      Hardware complicating wound infection  S/P removal of hardware 6/2008      S/P biopsy  tongue lesion 7/2014      H/O right breast biopsy  1980's      History of cataract  left and right ; 6/2017, 12/2017          MEDICATIONS  Home Medications:  acetaminophen 325 mg oral tablet: 2 tab(s) orally every 6 hours As needed Temp greater or equal to 38C (100.4F), Mild Pain (1 - 3) (30 Nov 2023 15:56)  clopidogrel 75 mg oral tablet: 1 tab(s) orally (26 Nov 2023 09:54)  losartan 100 mg oral tablet: 1 tab(s) orally (26 Nov 2023 09:54)  predniSONE 1 mg oral tablet: 4 tab(s) orally once a day everyday at 6 pm taper as per RA (30 Nov 2023 15:56)  predniSONE 5 mg oral tablet: 1 tab(s) orally once a day every day at 6 am, taper as per RA (30 Nov 2023 15:56)  pregabalin 200 mg oral capsule: 1 cap(s) orally once a day (at bedtime) (26 Nov 2023 09:55)  simvastatin 40 mg oral tablet: 1 tab(s) orally (26 Nov 2023 09:55)  Synthroid 125 mcg (0.125 mg) oral tablet: 1 tab(s) orally once a day in am (26 Nov 2023 09:56)      ALLERGIES  No Known Allergies      FAMILY Hx  FAMILY HISTORY:  Family history of MI (myocardial infarction) (Father)        SOCIAL Hx  Social History:      VITALS  Vital Signs Last 24 Hrs  T(C): 36.6 (28 Aug 2024 05:40), Max: 36.8 (28 Aug 2024 01:50)  T(F): 97.9 (28 Aug 2024 05:40), Max: 98.2 (28 Aug 2024 01:50)  HR: 81 (28 Aug 2024 05:40) (70 - 81)  BP: 152/85 (28 Aug 2024 05:40) (152/85 - 170/62)  BP(mean): --  RR: 18 (28 Aug 2024 05:40) (18 - 18)  SpO2: 99% (28 Aug 2024 05:40) (98% - 99%)    Parameters below as of 28 Aug 2024 05:40  Patient On (Oxygen Delivery Method): room air        PHYSICAL EXAM  Gen: Lying in bed, NAD  Resp: No increased WOB  LUE  Skin intact, +edema +ecchymosis over wrist  +TTP over wrist  Limited ROM 2/2 pain  NVI  SILT  LLE:  Skin intact, shortened and externally rotated, +edema and +ecchymosis over L hip  +TTP over L hip, no TTP along remainder of extremity; compartments soft  Limited ROM at hip 2/2 pain  +Log roll test  +Pain with axial loading  Motor: TA/EHL/GS/FHL intact  Sensory: DP/SP/Tib/Rebecca/Saph SILT  +DP pulse, WWP    Secondary survey:  No TTP along spine or other extremities, pelvis grossly stable, SILT and compartments soft throughout    LABS                        10.5   10.56 )-----------( 249      ( 28 Aug 2024 04:06 )             35.0     08-28    143  |  108<H>  |  21  ----------------------------<  96  3.9   |  21<L>  |  0.81    Ca    8.4      28 Aug 2024 04:06    TPro  6.0  /  Alb  3.7  /  TBili  0.3  /  DBili  x   /  AST  20  /  ALT  17  /  AlkPhos  49  08-28    PT/INR - ( 28 Aug 2024 04:06 )   PT: 11.0 sec;   INR: 0.98 ratio         PTT - ( 28 Aug 2024 04:06 )  PTT:27.8 sec    IMAGING  XRs: L IT fracture     L distal radius fracture and ulnar styloid fracture     ASSESSMENT & PLAN  83yFemale w/ L IT fx.  -NWB LLE, bedrest  [facility]  -[facility]   -f/u preop: CBC, BMP, coags, T&S x2, CXR, EKG  -NPO IVF  -hold chemical DVT ppx for OR; SCDs OK  -please document medical clearance ASAP for OR  -pain control  -ice/cold compress     (28 Aug 2024 07:54)      PAST MEDICAL & SURGICAL HISTORY:  Hypothyroid      Hyperlipidemia      Oral Cancer      Basal Cell Cancer  biopsy of nose  5/2010      Malignant neoplasm of mandible  left- 2008      Malignant neoplasm of tongue      Radiation therapy complication  burn to left neck area      History of radiation therapy  2008      Radiation dermatitis  left neck      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      Hardware complicating wound infection  S/P removal of hardware 6/2008      S/P biopsy  tongue lesion 7/2014      H/O right breast biopsy  1980's      History of cataract  left and right ; 6/2017, 12/2017          PREVIOUS DIAGNOSTIC TESTING:      ECHO  FINDINGS:    STRESS  FINDINGS:    CATHETERIZATION  FINDINGS:    MEDICATIONS  (STANDING):  acetaminophen     Tablet .. 975 milliGRAM(s) Oral every 8 hours  atorvastatin 20 milliGRAM(s) Oral at bedtime  cholecalciferol 1000 Unit(s) Oral daily  enoxaparin Injectable 40 milliGRAM(s) SubCutaneous every 24 hours  levothyroxine 150 MICROGram(s) Oral daily  losartan 100 milliGRAM(s) Oral daily  povidone iodine 10% Nasal Swab 1 Application(s) Both Nostrils once  predniSONE   Tablet 3 milliGRAM(s) Oral at bedtime  predniSONE   Tablet 4 milliGRAM(s) Oral daily    MEDICATIONS  (PRN):  cyclobenzaprine 10 milliGRAM(s) Oral three times a day PRN Muscle Spasm  ondansetron    Tablet 4 milliGRAM(s) Oral every 6 hours PRN Nausea and/or Vomiting  oxyCODONE    IR 5 milliGRAM(s) Oral every 4 hours PRN Moderate Pain (4 - 6)  oxyCODONE    IR 10 milliGRAM(s) Oral every 4 hours PRN Severe Pain (7 - 10)  traMADol 50 milliGRAM(s) Oral every 6 hours PRN Mild Pain (1 - 3)      FAMILY HISTORY:  Family history of MI (myocardial infarction) (Father)        SOCIAL HISTORY:    CIGARETTES:    ALCOHOL:    REVIEW OF SYSTEMS:  CONSTITUTIONAL: No fever, weight loss, or fatigue  EYES: No eye pain, visual disturbances, or discharge  ENMT:  No difficulty hearing, tinnitus, vertigo; No sinus or throat pain  NECK: No pain or stiffness  RESPIRATORY: No cough, wheezing, chills or hemoptysis; No shortness of breath  CARDIOVASCULAR: No chest pain, palpitations, dizziness, or leg swelling  GASTROINTESTINAL: No abdominal or epigastric pain. No nausea, vomiting, or hematemesis; No diarrhea or constipation. No melena or hematochezia.  GENITOURINARY: No dysuria, frequency, hematuria, or incontinence  NEUROLOGICAL: No headaches, memory loss, loss of strength, numbness, or tremors  SKIN: No itching, burning, rashes, or lesions   LYMPH NODES: No enlarged glands  ENDOCRINE: No heat or cold intolerance; No hair loss  MUSCULOSKELETAL: No joint pain or swelling; No muscle, back, or extremity pain  PSYCHIATRIC: No depression, anxiety, mood swings, or difficulty sleeping  HEME/LYMPH: No easy bruising, or bleeding gums  ALLERY AND IMMUNOLOGIC: No hives or eczema    Vital Signs Last 24 Hrs  T(C): 36.5 (31 Aug 2024 21:35), Max: 37.1 (31 Aug 2024 09:23)  T(F): 97.7 (31 Aug 2024 21:35), Max: 98.7 (31 Aug 2024 09:23)  HR: 74 (31 Aug 2024 21:35) (74 - 92)  BP: 136/96 (31 Aug 2024 21:35) (100/55 - 149/59)  BP(mean): --  RR: 17 (31 Aug 2024 21:35) (17 - 18)  SpO2: 100% (31 Aug 2024 21:35) (95% - 100%)    Parameters below as of 31 Aug 2024 21:35  Patient On (Oxygen Delivery Method): nasal cannula  O2 Flow (L/min): 2        PHYSICAL EXAM:  GENERAL: NAD, well-groomed, well-developed  HEAD:  Atraumatic, Normocephalic  EYES: EOMI, PERRLA, conjunctiva and sclera clear  ENMT: No tonsillar erythema, exudates, or enlargement; Moist mucous membranes, Good dentition, No lesions  NECK: Supple, No JVD, Normal thyroid  NERVOUS SYSTEM:  Alert & Oriented X3, Good concentration; Motor Strength 5/5 B/L upper and lower extremities; DTRs 2+ intact and symmetric  CHEST/LUNG: Clear to percussion bilaterally; No rales, rhonchi, wheezing, or rubs  HEART: Regular rate and rhythm; No murmurs, rubs, or gallops  ABDOMEN: Soft, Nontender, Nondistended; Bowel sounds present  EXTREMITIES:  2+ Peripheral Pulses, No clubbing, cyanosis, or edema  LYMPH: No lymphadenopathy noted  SKIN: No rashes or lesions      INTERPRETATION OF TELEMETRY:    ECG:    CHADSVASC:     LABS:                        7.7    5.79  )-----------( 191      ( 31 Aug 2024 06:39 )             25.5     08-31    139  |  106  |  8   ----------------------------<  88  4.3   |  22  |  0.63    Ca    8.3<L>      31 Aug 2024 06:39            Urinalysis Basic - ( 31 Aug 2024 06:39 )    Color: x / Appearance: x / SG: x / pH: x  Gluc: 88 mg/dL / Ketone: x  / Bili: x / Urobili: x   Blood: x / Protein: x / Nitrite: x   Leuk Esterase: x / RBC: x / WBC x   Sq Epi: x / Non Sq Epi: x / Bacteria: x      Lipid Panel:   I&O's Summary    30 Aug 2024 07:01  -  31 Aug 2024 07:00  --------------------------------------------------------  IN: 0 mL / OUT: 400 mL / NET: -400 mL    31 Aug 2024 07:01  -  31 Aug 2024 22:31  --------------------------------------------------------  IN: 0 mL / OUT: 650 mL / NET: -650 mL        RADIOLOGY & ADDITIONAL STUDIES:    
PATIENT SEEN AND EXAMINED BY ISELA LOPEZ M.D. ON :- 8/29/24  DATE OF SERVICE:  8/29/24          Interim events noted,Labs ,Radiological studies and Cardiology tests reviewed .    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 Pt states she fell tripped and fell in her kitchen, hitting her wrist on the table before landing on her left side. Unable to bear weight in the LLE since the fall. Denies headstrike or LOC. Denies numbness/tingling in the LLE. Denies any other trauma/injuries at this time. At baseline, community ambulator w/o assistive devices.    ROS  Negative unless otherwise specified in HPI.    PAST MEDICAL & SURGICAL Hx  PAST MEDICAL & SURGICAL HISTORY:  Hypothyroid      Hyperlipidemia      Oral Cancer      Basal Cell Cancer  biopsy of nose  5/2010      Malignant neoplasm of mandible  left- 2008      Malignant neoplasm of tongue      Radiation therapy complication  burn to left neck area      History of radiation therapy  2008      Radiation dermatitis  left neck      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      Hardware complicating wound infection  S/P removal of hardware 6/2008      S/P biopsy  tongue lesion 7/2014      H/O right breast biopsy  1980's      History of cataract  left and right ; 6/2017, 12/2017          MEDICATIONS  Home Medications:  acetaminophen 325 mg oral tablet: 2 tab(s) orally every 6 hours As needed Temp greater or equal to 38C (100.4F), Mild Pain (1 - 3) (30 Nov 2023 15:56)  clopidogrel 75 mg oral tablet: 1 tab(s) orally (26 Nov 2023 09:54)  losartan 100 mg oral tablet: 1 tab(s) orally (26 Nov 2023 09:54)  predniSONE 1 mg oral tablet: 4 tab(s) orally once a day everyday at 6 pm taper as per RA (30 Nov 2023 15:56)  predniSONE 5 mg oral tablet: 1 tab(s) orally once a day every day at 6 am, taper as per RA (30 Nov 2023 15:56)  pregabalin 200 mg oral capsule: 1 cap(s) orally once a day (at bedtime) (26 Nov 2023 09:55)  simvastatin 40 mg oral tablet: 1 tab(s) orally (26 Nov 2023 09:55)  Synthroid 125 mcg (0.125 mg) oral tablet: 1 tab(s) orally once a day in am (26 Nov 2023 09:56)      ALLERGIES  No Known Allergies      FAMILY Hx  FAMILY HISTORY:  Family history of MI (myocardial infarction) (Father)        SOCIAL Hx  Social History:      VITALS  Vital Signs Last 24 Hrs  T(C): 36.6 (28 Aug 2024 05:40), Max: 36.8 (28 Aug 2024 01:50)  T(F): 97.9 (28 Aug 2024 05:40), Max: 98.2 (28 Aug 2024 01:50)  HR: 81 (28 Aug 2024 05:40) (70 - 81)  BP: 152/85 (28 Aug 2024 05:40) (152/85 - 170/62)  BP(mean): --  RR: 18 (28 Aug 2024 05:40) (18 - 18)  SpO2: 99% (28 Aug 2024 05:40) (98% - 99%)    Parameters below as of 28 Aug 2024 05:40  Patient On (Oxygen Delivery Method): room air        PHYSICAL EXAM  Gen: Lying in bed, NAD  Resp: No increased WOB  LUE  Skin intact, +edema +ecchymosis over wrist  +TTP over wrist  Limited ROM 2/2 pain  NVI  SILT  LLE:  Skin intact, shortened and externally rotated, +edema and +ecchymosis over L hip  +TTP over L hip, no TTP along remainder of extremity; compartments soft  Limited ROM at hip 2/2 pain  +Log roll test  +Pain with axial loading  Motor: TA/EHL/GS/FHL intact  Sensory: DP/SP/Tib/Rebecca/Saph SILT  +DP pulse, WWP    Secondary survey:  No TTP along spine or other extremities, pelvis grossly stable, SILT and compartments soft throughout    LABS                        10.5   10.56 )-----------( 249      ( 28 Aug 2024 04:06 )             35.0     08-28    143  |  108<H>  |  21  ----------------------------<  96  3.9   |  21<L>  |  0.81    Ca    8.4      28 Aug 2024 04:06    TPro  6.0  /  Alb  3.7  /  TBili  0.3  /  DBili  x   /  AST  20  /  ALT  17  /  AlkPhos  49  08-28    PT/INR - ( 28 Aug 2024 04:06 )   PT: 11.0 sec;   INR: 0.98 ratio         PTT - ( 28 Aug 2024 04:06 )  PTT:27.8 sec    IMAGING  XRs: L IT fracture     L distal radius fracture and ulnar styloid fracture     ASSESSMENT & PLAN  83yFemale w/ L IT fx.  -NWB LLE, bedrest  -NWB LUE in sugarton  -OR today   -f/u preop: CBC, BMP, coags, T&S x2, CXR, EKG  -NPO IVF  -hold chemical DVT ppx for OR; SCDs OK  -please document medical clearance ASAP for OR  -pain control  -ice/cold compress     (28 Aug 2024 07:54)      PAST MEDICAL & SURGICAL HISTORY:  Hypothyroid      Hyperlipidemia      Oral Cancer      Basal Cell Cancer  biopsy of nose  5/2010      Malignant neoplasm of mandible  left- 2008      Malignant neoplasm of tongue      Radiation therapy complication  burn to left neck area      History of radiation therapy  2008      Radiation dermatitis  left neck      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      Hardware complicating wound infection  S/P removal of hardware 6/2008      S/P biopsy  tongue lesion 7/2014      H/O right breast biopsy  1980's      History of cataract  left and right ; 6/2017, 12/2017          PREVIOUS DIAGNOSTIC TESTING:      ECHO  FINDINGS:    STRESS  FINDINGS:    CATHETERIZATION  FINDINGS:    MEDICATIONS  (STANDING):  acetaminophen     Tablet .. 975 milliGRAM(s) Oral every 8 hours  atorvastatin 20 milliGRAM(s) Oral at bedtime  chlorhexidine 2% Cloths 1 Application(s) Topical <User Schedule>  cholecalciferol 1000 Unit(s) Oral daily  enoxaparin Injectable 40 milliGRAM(s) SubCutaneous every 24 hours  levothyroxine 150 MICROGram(s) Oral daily  losartan 100 milliGRAM(s) Oral daily  povidone iodine 10% Nasal Swab 1 Application(s) Both Nostrils once  predniSONE   Tablet 3 milliGRAM(s) Oral at bedtime  predniSONE   Tablet 4 milliGRAM(s) Oral daily  pregabalin 200 milliGRAM(s) Oral at bedtime  sodium chloride 0.9%. 1000 milliLiter(s) (125 mL/Hr) IV Continuous <Continuous>    MEDICATIONS  (PRN):  HYDROmorphone  Injectable 1 milliGRAM(s) IV Push every 6 hours PRN Moderate Pain (4 - 6)  oxyCODONE    IR 5 milliGRAM(s) Oral every 4 hours PRN Moderate Pain (4 - 6)  oxyCODONE    IR 10 milliGRAM(s) Oral every 4 hours PRN Severe Pain (7 - 10)      FAMILY HISTORY:  Family history of MI (myocardial infarction) (Father)        SOCIAL HISTORY:    CIGARETTES:    ALCOHOL:    REVIEW OF SYSTEMS:  CONSTITUTIONAL: No fever, weight loss, or fatigue  EYES: No eye pain, visual disturbances, or discharge  ENMT:  No difficulty hearing, tinnitus, vertigo; No sinus or throat pain  NECK: No pain or stiffness  RESPIRATORY: No cough, wheezing, chills or hemoptysis; No shortness of breath  CARDIOVASCULAR: No chest pain, palpitations, dizziness, or leg swelling  GASTROINTESTINAL: No abdominal or epigastric pain. No nausea, vomiting, or hematemesis; No diarrhea or constipation. No melena or hematochezia.  GENITOURINARY: No dysuria, frequency, hematuria, or incontinence  NEUROLOGICAL: No headaches, memory loss, loss of strength, numbness, or tremors  SKIN: No itching, burning, rashes, or lesions   LYMPH NODES: No enlarged glands  ENDOCRINE: No heat or cold intolerance; No hair loss  MUSCULOSKELETAL: No joint pain or swelling; No muscle, back, or extremity pain  PSYCHIATRIC: No depression, anxiety, mood swings, or difficulty sleeping  HEME/LYMPH: No easy bruising, or bleeding gums  ALLERY AND IMMUNOLOGIC: No hives or eczema    Vital Signs Last 24 Hrs  T(C): 36.7 (29 Aug 2024 17:52), Max: 37.3 (29 Aug 2024 05:26)  T(F): 98 (29 Aug 2024 17:52), Max: 99.1 (29 Aug 2024 05:26)  HR: 97 (29 Aug 2024 17:52) (89 - 97)  BP: 145/97 (29 Aug 2024 17:52) (103/57 - 145/97)  BP(mean): --  RR: 17 (29 Aug 2024 17:52) (17 - 17)  SpO2: 97% (29 Aug 2024 17:52) (97% - 98%)    Parameters below as of 29 Aug 2024 17:52  Patient On (Oxygen Delivery Method): nasal cannula  O2 Flow (L/min): 2        PHYSICAL EXAM:  GENERAL: NAD, well-groomed, well-developed  HEAD:  Atraumatic, Normocephalic  EYES: EOMI, PERRLA, conjunctiva and sclera clear  ENMT: No tonsillar erythema, exudates, or enlargement; Moist mucous membranes, Good dentition, No lesions  NECK: Supple, No JVD, Normal thyroid  NERVOUS SYSTEM:  Alert & Oriented X3, Good concentration; Motor Strength 5/5 B/L upper and lower extremities; DTRs 2+ intact and symmetric  CHEST/LUNG: Clear to percussion bilaterally; No rales, rhonchi, wheezing, or rubs  HEART: Regular rate and rhythm; No murmurs, rubs, or gallops  ABDOMEN: Soft, Nontender, Nondistended; Bowel sounds present  EXTREMITIES:  2+ Peripheral Pulses, No clubbing, cyanosis, or edema  LYMPH: No lymphadenopathy noted  SKIN: No rashes or lesions      INTERPRETATION OF TELEMETRY:    ECG:    [facility]     LABS:                        8.1    7.89  )-----------( 189      ( 29 Aug 2024 09:08 )             27.0     08-29    139  |  109<H>  |  12  ----------------------------<  98  4.2   |  19<L>  |  0.77    Ca    6.8<L>      29 Aug 2024 06:15    TPro  x   /  Alb  2.7<L>  /  TBili  x   /  DBili  x   /  AST  x   /  ALT  x   /  AlkPhos  x   08-29        PT/INR - ( 28 Aug 2024 04:06 )   PT: 11.0 sec;   INR: 0.98 ratio         PTT - ( 28 Aug 2024 04:06 )  PTT:27.8 sec  Urinalysis Basic - ( 29 Aug 2024 06:15 )    Color: x / Appearance: x / SG: x / pH: x  Gluc: 98 mg/dL / Ketone: x  / Bili: x / Urobili: x   Blood: x / Protein: x / Nitrite: x   Leuk Esterase: x / RBC: x / WBC x   Sq Epi: x / Non Sq Epi: x / Bacteria: x      Lipid Panel:   I&O's Summary    28 Aug 2024 07:01  -  29 Aug 2024 07:00  --------------------------------------------------------  IN: 495 mL / OUT: 700 mL / NET: -205 mL    29 Aug 2024 07:01  -  29 Aug 2024 21:36  --------------------------------------------------------  IN: 0 mL / OUT: 700 mL / NET: -700 mL        RADIOLOGY & ADDITIONAL STUDIES:    
[facility] Division of Hospital Medicine  Catina Johnson DO  Pager (PO, 8A-5P): [phone]      Patient is a 83y old  Female who presents with a chief complaint of L IT Fx (30 Aug 2024 06:33)      SUBJECTIVE / OVERNIGHT EVENTS: Per daughter at bedside, patient was in excruciating pain yesterday afternoon after block wore off. Patient sleeping comfortably.    MEDICATIONS  (STANDING):  acetaminophen     Tablet .. 975 milliGRAM(s) Oral every 8 hours  atorvastatin 20 milliGRAM(s) Oral at bedtime  chlorhexidine 2% Cloths 1 Application(s) Topical <User Schedule>  cholecalciferol 1000 Unit(s) Oral daily  enoxaparin Injectable 40 milliGRAM(s) SubCutaneous every 24 hours  levothyroxine 150 MICROGram(s) Oral daily  losartan 100 milliGRAM(s) Oral daily  povidone iodine 10% Nasal Swab 1 Application(s) Both Nostrils once  predniSONE   Tablet 4 milliGRAM(s) Oral daily  predniSONE   Tablet 3 milliGRAM(s) Oral at bedtime  pregabalin 200 milliGRAM(s) Oral at bedtime  sodium chloride 0.9%. 1000 milliLiter(s) (125 mL/Hr) IV Continuous <Continuous>    MEDICATIONS  (PRN):  diphenhydrAMINE 25 milliGRAM(s) Oral once PRN Rash and/or Itching  HYDROmorphone  Injectable 1 milliGRAM(s) IV Push every 6 hours PRN Moderate Pain (4 - 6)  ondansetron    Tablet 4 milliGRAM(s) Oral every 6 hours PRN Nausea and/or Vomiting  oxyCODONE    IR 5 milliGRAM(s) Oral every 4 hours PRN Moderate Pain (4 - 6)  oxyCODONE    IR 10 milliGRAM(s) Oral every 4 hours PRN Severe Pain (7 - 10)      CAPILLARY BLOOD GLUCOSE        I&O's Summary    29 Aug 2024 07:01  -  30 Aug 2024 07:00  --------------------------------------------------------  IN: 0 mL / OUT: 900 mL / NET: -900 mL        PHYSICAL EXAM:  Vital Signs Last 24 Hrs  T(C): 36.7 (30 Aug 2024 09:36), Max: 37 (29 Aug 2024 21:36)  T(F): 98.1 (30 Aug 2024 09:36), Max: 98.6 (29 Aug 2024 21:36)  HR: 76 (30 Aug 2024 09:36) (76 - 97)  BP: 149/53 (30 Aug 2024 09:36) (125/55 - 149/53)  BP(mean): --  RR: 18 (30 Aug 2024 09:36) (16 - 18)  SpO2: 97% (30 Aug 2024 09:36) (96% - 100%)    Parameters below as of 30 Aug 2024 09:36  Patient On (Oxygen Delivery Method): room air    GENERAL: NAD, on RA, sleeping comfortably  HEENT: mandibular reconstruction  RESPIRATORY: normal respiratory effort  CARDIOVASCULAR: Regular rate and rhythm  GASTROINTESTINAL: Soft, Nontender, Nondistended; Bowel sounds present  EXTREMITIES:  WWP, no edema  NERVOUS SYSTEM:  awake, alert, no gross deficits  MSK: LUE splint in place, LLE dressing C/D/I  PSYCH: calm cooperative  SKIN: No rashes or lesions; Incisions C/D/I    LABS:                        7.3    6.67  )-----------( 167      ( 30 Aug 2024 06:38 )             24.8     08-30    138  |  110<H>  |  10  ----------------------------<  118<H>  4.2   |  22  |  0.66    Ca    8.0<L>      30 Aug 2024 06:38    TPro  x   /  Alb  2.7<L>  /  TBili  x   /  DBili  x   /  AST  x   /  ALT  x   /  AlkPhos  x   08-29          Urinalysis Basic - ( 30 Aug 2024 06:38 )    Color: x / Appearance: x / SG: x / pH: x  Gluc: 118 mg/dL / Ketone: x  / Bili: x / Urobili: x   Blood: x / Protein: x / Nitrite: x   Leuk Esterase: x / RBC: x / WBC x   Sq Epi: x / Non Sq Epi: x / Bacteria: x          RADIOLOGY & ADDITIONAL TESTS:  Results Reviewed:   Imaging Personally Reviewed:  Electrocardiogram Personally Reviewed:    COORDINATION OF CARE:  Care Discussed with Consultants/Other Providers [Y/N]:  Prior or Outpatient Records Reviewed [Y/N]:  
Medicine Progress Note    Patient is a 83y old  Female who presents with a chief complaint of L IT Fx (31 Aug 2024 08:27)      SUBJECTIVE / OVERNIGHT EVENTS: pain controlled     ADDITIONAL REVIEW OF SYSTEMS: negative     MEDICATIONS  (STANDING):  acetaminophen     Tablet .. 975 milliGRAM(s) Oral every 8 hours  atorvastatin 20 milliGRAM(s) Oral at bedtime  cholecalciferol 1000 Unit(s) Oral daily  enoxaparin Injectable 40 milliGRAM(s) SubCutaneous every 24 hours  levothyroxine 150 MICROGram(s) Oral daily  losartan 100 milliGRAM(s) Oral daily  povidone iodine 10% Nasal Swab 1 Application(s) Both Nostrils once  predniSONE   Tablet 3 milliGRAM(s) Oral at bedtime  predniSONE   Tablet 4 milliGRAM(s) Oral daily    MEDICATIONS  (PRN):  cyclobenzaprine 10 milliGRAM(s) Oral three times a day PRN Muscle Spasm  ondansetron    Tablet 4 milliGRAM(s) Oral every 6 hours PRN Nausea and/or Vomiting  oxyCODONE    IR 5 milliGRAM(s) Oral every 4 hours PRN Moderate Pain (4 - 6)  oxyCODONE    IR 10 milliGRAM(s) Oral every 4 hours PRN Severe Pain (7 - 10)  traMADol 50 milliGRAM(s) Oral every 6 hours PRN Mild Pain (1 - 3)    CAPILLARY BLOOD GLUCOSE        I&O's Summary    30 Aug 2024 07:01  -  31 Aug 2024 07:00  --------------------------------------------------------  IN: 0 mL / OUT: 400 mL / NET: -400 mL    31 Aug 2024 07:01  -  31 Aug 2024 20:39  --------------------------------------------------------  IN: 0 mL / OUT: 650 mL / NET: -650 mL        PHYSICAL EXAM:  Vital Signs Last 24 Hrs  T(C): 36.7 (31 Aug 2024 17:29), Max: 37.1 (31 Aug 2024 09:23)  T(F): 98 (31 Aug 2024 17:29), Max: 98.7 (31 Aug 2024 09:23)  HR: 84 (31 Aug 2024 17:29) (80 - 92)  BP: 112/53 (31 Aug 2024 17:29) (100/55 - 149/59)  BP(mean): --  RR: 18 (31 Aug 2024 17:29) (17 - 18)  SpO2: 99% (31 Aug 2024 17:29) (95% - 99%)    Parameters below as of 31 Aug 2024 17:29  Patient On (Oxygen Delivery Method): nasal cannula  O2 Flow (L/min): 2    CONSTITUTIONAL: NAD,  ENMT: Moist oral mucosa, no pharyngeal injection or exudates;   RESPIRATORY: Normal respiratory effort; lungs are clear to auscultation bilaterally  CARDIOVASCULAR: Regular rate and rhythm, normal S1 and S2, No lower extremity edema; LUE sling/dressing   ABDOMEN: Nontender to palpation, normoactive bowel sounds, no rebound/guarding;   PSYCH: A+O to person, place, and time; affect appropriate, calm   NEUROLOGY: CN 2-12 are intact and symmetric; no gross sensory deficits   SKIN: No rashes;     LABS:                        7.7    5.79  )-----------( 191      ( 31 Aug 2024 06:39 )             25.5     08-31    139  |  106  |  8   ----------------------------<  88  4.3   |  22  |  0.63    Ca    8.3<L>      31 Aug 2024 06:39            Urinalysis Basic - ( 31 Aug 2024 06:39 )    Color: x / Appearance: x / SG: x / pH: x  Gluc: 88 mg/dL / Ketone: x  / Bili: x / Urobili: x   Blood: x / Protein: x / Nitrite: x   Leuk Esterase: x / RBC: x / WBC x   Sq Epi: x / Non Sq Epi: x / Bacteria: x            RADIOLOGY & ADDITIONAL TESTS:  Imaging from Last 24 Hours:    Electrocardiogram/QTc Interval:    COORDINATION OF CARE:  Care Discussed with Consultants/Other Providers:  
Medicine Progress Note    Patient is a 83y old  Female who presents with a chief complaint of L IT Fx (01 Sep 2024 05:16)      SUBJECTIVE / OVERNIGHT EVENTS: no events over night, pain controlled with tramadol    ADDITIONAL REVIEW OF SYSTEMS: negative     MEDICATIONS  (STANDING):  acetaminophen     Tablet .. 975 milliGRAM(s) Oral every 8 hours  atorvastatin 20 milliGRAM(s) Oral at bedtime  cholecalciferol 1000 Unit(s) Oral daily  enoxaparin Injectable 40 milliGRAM(s) SubCutaneous every 24 hours  levothyroxine 150 MICROGram(s) Oral daily  losartan 100 milliGRAM(s) Oral daily  povidone iodine 10% Nasal Swab 1 Application(s) Both Nostrils once  predniSONE   Tablet 3 milliGRAM(s) Oral at bedtime  predniSONE   Tablet 4 milliGRAM(s) Oral daily    MEDICATIONS  (PRN):  cyclobenzaprine 10 milliGRAM(s) Oral three times a day PRN Muscle Spasm  ondansetron    Tablet 4 milliGRAM(s) Oral every 6 hours PRN Nausea and/or Vomiting  oxyCODONE    IR 5 milliGRAM(s) Oral every 4 hours PRN Moderate Pain (4 - 6)  oxyCODONE    IR 10 milliGRAM(s) Oral every 4 hours PRN Severe Pain (7 - 10)  traMADol 50 milliGRAM(s) Oral every 6 hours PRN Mild Pain (1 - 3)    CAPILLARY BLOOD GLUCOSE        I&O's Summary    31 Aug 2024 07:01  -  01 Sep 2024 07:00  --------------------------------------------------------  IN: 0 mL / OUT: 650 mL / NET: -650 mL    01 Sep 2024 07:01  -  01 Sep 2024 13:35  --------------------------------------------------------  IN: 0 mL / OUT: 200 mL / NET: -200 mL        PHYSICAL EXAM:  Vital Signs Last 24 Hrs  T(C): 36.3 (01 Sep 2024 09:55), Max: 36.7 (31 Aug 2024 17:29)  T(F): 97.4 (01 Sep 2024 09:55), Max: 98 (31 Aug 2024 17:29)  HR: 83 (01 Sep 2024 09:55) (69 - 84)  BP: 146/69 (01 Sep 2024 09:55) (112/53 - 146/69)  BP(mean): --  RR: 18 (01 Sep 2024 09:55) (17 - 18)  SpO2: 100% (01 Sep 2024 09:55) (99% - 100%)    Parameters below as of 01 Sep 2024 09:55  Patient On (Oxygen Delivery Method): nasal cannula  O2 Flow (L/min): 2    CONSTITUTIONAL: NAD,   ENMT: Moist oral mucosa, no pharyngeal injection or exudates;   RESPIRATORY: Normal respiratory effort; lungs are dim at bases  to auscultation bilaterally  CARDIOVASCULAR: Regular rate and rhythm, normal S1 and S2,\; No lower extremity edema; Left UE dressing swing   ABDOMEN: Nontender to palpation, normoactive bowel sounds, no rebound/guarding;  PSYCH: A+O to person, place, and time; affect appropriate  NEUROLOGY: CN 2-12 are intact and symmetric; no gross sensory deficits   SKIN: No rashes;       LABS:                        7.3    4.93  )-----------( 207      ( 01 Sep 2024 07:14 )             24.5     08-31    139  |  106  |  8   ----------------------------<  88  4.3   |  22  |  0.63    Ca    8.3<L>      31 Aug 2024 06:39            Urinalysis Basic - ( 31 Aug 2024 06:39 )    Color: x / Appearance: x / SG: x / pH: x  Gluc: 88 mg/dL / Ketone: x  / Bili: x / Urobili: x   Blood: x / Protein: x / Nitrite: x   Leuk Esterase: x / RBC: x / WBC x   Sq Epi: x / Non Sq Epi: x / Bacteria: x            RADIOLOGY & ADDITIONAL TESTS:  Imaging from Last 24 Hours:    Electrocardiogram/QTc Interval:    COORDINATION OF CARE:  Care Discussed with Consultants/Other Providers: Ortho

## 2024-09-01 NOTE — PROGRESS NOTE ADULT - PROBLEM SELECTOR PLAN 7
BP acceptable  c/w home losartan.

## 2024-09-01 NOTE — PROGRESS NOTE ADULT - PROBLEM SELECTOR PLAN 1
Expected postop blood loss, 100cc EBL in OR 8/28  Baseline Hgb 9-10  Continue monitoring H/H, downtrend to 7.3 noted 8/30 but no overt blood loss  Transfuse for symptoms or if Hgb<7

## 2024-09-01 NOTE — PROGRESS NOTE ADULT - PROBLEM SELECTOR PLAN 4
Dx in 2008 s/p reconstruction using LLE/resection/radiation  In remission  outpt ENT followup.  Monitor O2 sat on RA and ambulation  Ensure chin not obstructing airway when sleeping upright

## 2024-09-01 NOTE — PROGRESS NOTE ADULT - PROBLEM SELECTOR PLAN 6
distal radius fracture was reduced and splinted.  MELY ROMEROE in [facility] splint  Management as per ortho.
distal radius fracture was reduced and splinted.  MELY ROMEROE in [facility] splint  Management as per ortho.
distal radius fracture was reduced and splinted.  MELY ROMEROE in [facility] splint  Management as per ortho.

## 2024-09-01 NOTE — PROGRESS NOTE ADULT - PROBLEM SELECTOR PLAN 5
s/p Left Hip IMN 8/28  Postop management per ortho  Pain control, bowel regimen  PT/OT  LLE WBAT  DVT Prophylaxis: Lovenox.

## 2024-09-01 NOTE — PROGRESS NOTE ADULT - PROBLEM SELECTOR PLAN 3
Corrected calcium improved s/p calcium supplementation   Vit D 25OH at 28.2 suggestive of insufficiency: started supplementation  Trend calcium daily. Correct calcium for albumin

## 2024-09-01 NOTE — PROGRESS NOTE ADULT - PROBLEM SELECTOR PLAN 8
c/w home dose prednisone, on extended taper outpatient   Outpt followup with rheumatology  c/w lyrica for UE neuropathy.

## 2024-09-09 NOTE — H&P PST ADULT - TEACHING/LEARNING RELIGIOUS CONSIDERATIONS
Patient calling to see if it would be ok if he can take the joint and mobility supplement Omega XL.   none

## 2024-10-05 PROBLEM — S72.142A INTERTROCHANTERIC FRACTURE OF LEFT HIP: Status: ACTIVE | Noted: 2024-10-05

## 2024-10-08 ENCOUNTER — APPOINTMENT (OUTPATIENT)
Dept: ORTHOPEDIC SURGERY | Facility: CLINIC | Age: 84
End: 2024-10-08
Payer: MEDICARE

## 2024-10-08 DIAGNOSIS — S72.142A DISPLACED INTERTROCHANTERIC FRACTURE OF LEFT FEMUR, INITIAL ENCOUNTER FOR CLOSED FRACTURE: ICD-10-CM

## 2024-10-08 DIAGNOSIS — S52.502A UNSPECIFIED FRACTURE OF THE LOWER END OF LEFT RADIUS, INITIAL ENCOUNTER FOR CLOSED FRACTURE: ICD-10-CM

## 2024-10-08 PROCEDURE — 99024 POSTOP FOLLOW-UP VISIT: CPT

## 2024-10-08 PROCEDURE — 73110 X-RAY EXAM OF WRIST: CPT | Mod: LT

## 2024-10-08 PROCEDURE — 73502 X-RAY EXAM HIP UNI 2-3 VIEWS: CPT | Mod: 26,LT

## 2024-11-20 ENCOUNTER — INPATIENT (INPATIENT)
Facility: HOSPITAL | Age: 84
LOS: 1 days | Discharge: NURSING, REHAB OR LONG-TERM CARE | End: 2024-11-22
Attending: INTERNAL MEDICINE | Admitting: INTERNAL MEDICINE
Payer: MEDICARE

## 2024-11-20 VITALS
DIASTOLIC BLOOD PRESSURE: 70 MMHG | TEMPERATURE: 98 F | SYSTOLIC BLOOD PRESSURE: 149 MMHG | HEART RATE: 88 BPM | OXYGEN SATURATION: 99 % | RESPIRATION RATE: 18 BRPM | WEIGHT: 156.09 LBS | HEIGHT: 62 IN

## 2024-11-20 DIAGNOSIS — E03.9 HYPOTHYROIDISM, UNSPECIFIED: ICD-10-CM
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
## 2024-11-20 DIAGNOSIS — Z98.890 OTHER SPECIFIED POSTPROCEDURAL STATES: Chronic | ICD-10-CM

## 2024-11-20 DIAGNOSIS — Z98.89 OTHER SPECIFIED POSTPROCEDURAL STATES: Chronic | ICD-10-CM

## 2024-11-20 DIAGNOSIS — I10 ESSENTIAL (PRIMARY) HYPERTENSION: ICD-10-CM

## 2024-11-20 DIAGNOSIS — M35.3 POLYMYALGIA RHEUMATICA: ICD-10-CM

## 2024-11-20 DIAGNOSIS — Z86.69 PERSONAL HISTORY OF OTHER DISEASES OF THE NERVOUS SYSTEM AND SENSE ORGANS: Chronic | ICD-10-CM

## 2024-11-20 DIAGNOSIS — E78.5 HYPERLIPIDEMIA, UNSPECIFIED: ICD-10-CM

## 2024-11-20 DIAGNOSIS — T84.7XXA INFECTION AND INFLAMMATORY REACTION DUE TO OTHER INTERNAL ORTHOPEDIC PROSTHETIC DEVICES, IMPLANTS AND GRAFTS, INITIAL ENCOUNTER: Chronic | ICD-10-CM

## 2024-11-20 DIAGNOSIS — S22.39XA FRACTURE OF ONE RIB, UNSPECIFIED SIDE, INITIAL ENCOUNTER FOR CLOSED FRACTURE: ICD-10-CM

## 2024-11-20 DIAGNOSIS — M54.50 LOW BACK PAIN, UNSPECIFIED: ICD-10-CM

## 2024-11-20 LAB
ALBUMIN SERPL ELPH-MCNC: 3.5 G/DL — SIGNIFICANT CHANGE UP (ref 3.3–5)
ALP SERPL-CCNC: 59 U/L — SIGNIFICANT CHANGE UP (ref 40–120)
ALT FLD-CCNC: 12 U/L — SIGNIFICANT CHANGE UP (ref 4–33)
ANION GAP SERPL CALC-SCNC: 9 MMOL/L — SIGNIFICANT CHANGE UP (ref 7–14)
ANISOCYTOSIS BLD QL: SLIGHT — SIGNIFICANT CHANGE UP
APPEARANCE UR: CLEAR — SIGNIFICANT CHANGE UP
AST SERPL-CCNC: 15 U/L — SIGNIFICANT CHANGE UP (ref 4–32)
BASOPHILS # BLD AUTO: 0.01 K/UL — SIGNIFICANT CHANGE UP (ref 0–0.2)
BASOPHILS NFR BLD AUTO: 0.2 % — SIGNIFICANT CHANGE UP (ref 0–2)
BILIRUB SERPL-MCNC: 0.2 MG/DL — SIGNIFICANT CHANGE UP (ref 0.2–1.2)
BILIRUB UR-MCNC: NEGATIVE — SIGNIFICANT CHANGE UP
BLOOD GAS VENOUS COMPREHENSIVE RESULT: SIGNIFICANT CHANGE UP
BUN SERPL-MCNC: 20 MG/DL — SIGNIFICANT CHANGE UP (ref 7–23)
CALCIUM SERPL-MCNC: 8 MG/DL — LOW (ref 8.4–10.5)
CHLORIDE SERPL-SCNC: 107 MMOL/L — SIGNIFICANT CHANGE UP (ref 98–107)
CO2 SERPL-SCNC: 24 MMOL/L — SIGNIFICANT CHANGE UP (ref 22–31)
COLOR SPEC: YELLOW — SIGNIFICANT CHANGE UP
CREAT SERPL-MCNC: 0.85 MG/DL — SIGNIFICANT CHANGE UP (ref 0.5–1.3)
DIFF PNL FLD: NEGATIVE — SIGNIFICANT CHANGE UP
EGFR: 67 ML/MIN/1.73M2 — SIGNIFICANT CHANGE UP
EOSINOPHIL # BLD AUTO: 0.05 K/UL — SIGNIFICANT CHANGE UP (ref 0–0.5)
EOSINOPHIL NFR BLD AUTO: 1.2 % — SIGNIFICANT CHANGE UP (ref 0–6)
GLUCOSE SERPL-MCNC: 90 MG/DL — SIGNIFICANT CHANGE UP (ref 70–99)
GLUCOSE UR QL: NEGATIVE MG/DL — SIGNIFICANT CHANGE UP
HCT VFR BLD CALC: 31.8 % — LOW (ref 34.5–45)
HGB BLD-MCNC: 9.6 G/DL — LOW (ref 11.5–15.5)
HYPOCHROMIA BLD QL: SLIGHT — SIGNIFICANT CHANGE UP
IANC: 3.65 K/UL — SIGNIFICANT CHANGE UP (ref 1.8–7.4)
IMM GRANULOCYTES NFR BLD AUTO: 0.7 % — SIGNIFICANT CHANGE UP (ref 0–0.9)
KETONES UR-MCNC: NEGATIVE MG/DL — SIGNIFICANT CHANGE UP
LEUKOCYTE ESTERASE UR-ACNC: NEGATIVE — SIGNIFICANT CHANGE UP
LG PLATELETS BLD QL AUTO: SLIGHT — SIGNIFICANT CHANGE UP
LYMPHOCYTES # BLD AUTO: 0.3 K/UL — LOW (ref 1–3.3)
LYMPHOCYTES # BLD AUTO: 7.1 % — LOW (ref 13–44)
MAGNESIUM SERPL-MCNC: 1.8 MG/DL — SIGNIFICANT CHANGE UP (ref 1.6–2.6)
MANUAL SMEAR VERIFICATION: SIGNIFICANT CHANGE UP
MCHC RBC-ENTMCNC: 24.7 PG — LOW (ref 27–34)
MCHC RBC-ENTMCNC: 30.2 G/DL — LOW (ref 32–36)
MCV RBC AUTO: 82 FL — SIGNIFICANT CHANGE UP (ref 80–100)
MONOCYTES # BLD AUTO: 0.16 K/UL — SIGNIFICANT CHANGE UP (ref 0–0.9)
MONOCYTES NFR BLD AUTO: 3.8 % — SIGNIFICANT CHANGE UP (ref 2–14)
NEUTROPHILS # BLD AUTO: 3.65 K/UL — SIGNIFICANT CHANGE UP (ref 1.8–7.4)
NEUTROPHILS NFR BLD AUTO: 87 % — HIGH (ref 43–77)
NITRITE UR-MCNC: NEGATIVE — SIGNIFICANT CHANGE UP
NRBC # BLD: 0 /100 WBCS — SIGNIFICANT CHANGE UP (ref 0–0)
NRBC # FLD: 0 K/UL — SIGNIFICANT CHANGE UP (ref 0–0)
OVALOCYTES BLD QL SMEAR: SLIGHT — SIGNIFICANT CHANGE UP
PH UR: 7 — SIGNIFICANT CHANGE UP (ref 5–8)
PHOSPHATE SERPL-MCNC: 2.1 MG/DL — LOW (ref 2.5–4.5)
PLAT MORPH BLD: NORMAL — SIGNIFICANT CHANGE UP
PLATELET # BLD AUTO: 191 K/UL — SIGNIFICANT CHANGE UP (ref 150–400)
PLATELET COUNT - ESTIMATE: NORMAL — SIGNIFICANT CHANGE UP
POTASSIUM SERPL-MCNC: 4.2 MMOL/L — SIGNIFICANT CHANGE UP (ref 3.5–5.3)
POTASSIUM SERPL-SCNC: 4.2 MMOL/L — SIGNIFICANT CHANGE UP (ref 3.5–5.3)
PROT SERPL-MCNC: 5.8 G/DL — LOW (ref 6–8.3)
PROT UR-MCNC: NEGATIVE MG/DL — SIGNIFICANT CHANGE UP
RBC # BLD: 3.88 M/UL — SIGNIFICANT CHANGE UP (ref 3.8–5.2)
RBC # FLD: 15.7 % — HIGH (ref 10.3–14.5)
RBC BLD AUTO: NORMAL — SIGNIFICANT CHANGE UP
SODIUM SERPL-SCNC: 140 MMOL/L — SIGNIFICANT CHANGE UP (ref 135–145)
SP GR SPEC: 1.01 — SIGNIFICANT CHANGE UP (ref 1–1.03)
UROBILINOGEN FLD QL: 0.2 MG/DL — SIGNIFICANT CHANGE UP (ref 0.2–1)
WBC # BLD: 4.2 K/UL — SIGNIFICANT CHANGE UP (ref 3.8–10.5)
WBC # FLD AUTO: 4.2 K/UL — SIGNIFICANT CHANGE UP (ref 3.8–10.5)

## 2024-11-20 PROCEDURE — 99223 1ST HOSP IP/OBS HIGH 75: CPT

## 2024-11-20 PROCEDURE — 99285 EMERGENCY DEPT VISIT HI MDM: CPT | Mod: FS

## 2024-11-20 PROCEDURE — 99497 ADVNCD CARE PLAN 30 MIN: CPT | Mod: 25

## 2024-11-20 PROCEDURE — 73110 X-RAY EXAM OF WRIST: CPT | Mod: 26,LT

## 2024-11-20 PROCEDURE — 70450 CT HEAD/BRAIN W/O DYE: CPT | Mod: 26,MC

## 2024-11-20 PROCEDURE — 72125 CT NECK SPINE W/O DYE: CPT | Mod: 26,MC

## 2024-11-20 PROCEDURE — 73522 X-RAY EXAM HIPS BI 3-4 VIEWS: CPT | Mod: 26

## 2024-11-20 PROCEDURE — 71260 CT THORAX DX C+: CPT | Mod: 26,MC

## 2024-11-20 PROCEDURE — 71046 X-RAY EXAM CHEST 2 VIEWS: CPT | Mod: 26

## 2024-11-20 PROCEDURE — 74177 CT ABD & PELVIS W/CONTRAST: CPT | Mod: 26,MC

## 2024-11-20 RX ORDER — MAGNESIUM, ALUMINUM HYDROXIDE 200-225/5
30 SUSPENSION, ORAL (FINAL DOSE FORM) ORAL EVERY 4 HOURS
Refills: 0 | Status: DISCONTINUED | OUTPATIENT
Start: 2024-11-20 | End: 2024-11-22

## 2024-11-20 RX ORDER — TETANUS TOXOID, REDUCED DIPHTHERIA TOXOID AND ACELLULAR PERTUSSIS VACCINE, ADSORBED 5; 2.5; 8; 8; 2.5 [IU]/.5ML; [IU]/.5ML; UG/.5ML; UG/.5ML; UG/.5ML
0.5 SUSPENSION INTRAMUSCULAR ONCE
Refills: 0 | Status: COMPLETED | OUTPATIENT
Start: 2024-11-20 | End: 2024-11-20

## 2024-11-20 RX ORDER — ACETAMINOPHEN 500MG 500 MG/1
650 TABLET, COATED ORAL ONCE
Refills: 0 | Status: COMPLETED | OUTPATIENT
Start: 2024-11-20 | End: 2024-11-20

## 2024-11-20 RX ORDER — KETOROLAC TROMETHAMINE 30 MG/ML
15 INJECTION INTRAMUSCULAR; INTRAVENOUS EVERY 8 HOURS
Refills: 0 | Status: DISCONTINUED | OUTPATIENT
Start: 2024-11-20 | End: 2024-11-22

## 2024-11-20 RX ORDER — ACETAMINOPHEN, DIPHENHYDRAMINE HCL, PHENYLEPHRINE HCL 325; 25; 5 MG/1; MG/1; MG/1
3 TABLET ORAL AT BEDTIME
Refills: 0 | Status: DISCONTINUED | OUTPATIENT
Start: 2024-11-20 | End: 2024-11-22

## 2024-11-20 RX ORDER — PREGABALIN 75 MG/1
200 CAPSULE ORAL AT BEDTIME
Refills: 0 | Status: DISCONTINUED | OUTPATIENT
Start: 2024-11-20 | End: 2024-11-21

## 2024-11-20 RX ORDER — LEVOTHYROXINE SODIUM 150 MCG
125 TABLET ORAL DAILY
Refills: 0 | Status: DISCONTINUED | OUTPATIENT
Start: 2024-11-20 | End: 2024-11-22

## 2024-11-20 RX ORDER — PREDNISONE 20 MG/1
4 TABLET ORAL DAILY
Refills: 0 | Status: DISCONTINUED | OUTPATIENT
Start: 2024-11-20 | End: 2024-11-22

## 2024-11-20 RX ORDER — KETOROLAC TROMETHAMINE 30 MG/ML
15 INJECTION INTRAMUSCULAR; INTRAVENOUS ONCE
Refills: 0 | Status: DISCONTINUED | OUTPATIENT
Start: 2024-11-20 | End: 2024-11-20

## 2024-11-20 RX ORDER — CLOPIDOGREL 75 MG/1
75 TABLET, FILM COATED ORAL DAILY
Refills: 0 | Status: DISCONTINUED | OUTPATIENT
Start: 2024-11-20 | End: 2024-11-22

## 2024-11-20 RX ORDER — PREDNISONE 20 MG/1
3 TABLET ORAL AT BEDTIME
Refills: 0 | Status: DISCONTINUED | OUTPATIENT
Start: 2024-11-20 | End: 2024-11-22

## 2024-11-20 RX ORDER — ACETAMINOPHEN 500MG 500 MG/1
1000 TABLET, COATED ORAL ONCE
Refills: 0 | Status: COMPLETED | OUTPATIENT
Start: 2024-11-20 | End: 2024-11-20

## 2024-11-20 RX ORDER — LOSARTAN POTASSIUM 100 MG/1
100 TABLET, FILM COATED ORAL
Refills: 0 | Status: DISCONTINUED | OUTPATIENT
Start: 2024-11-20 | End: 2024-11-22

## 2024-11-20 RX ORDER — ONDANSETRON HYDROCHLORIDE 4 MG/1
4 TABLET, FILM COATED ORAL EVERY 8 HOURS
Refills: 0 | Status: DISCONTINUED | OUTPATIENT
Start: 2024-11-20 | End: 2024-11-22

## 2024-11-20 RX ORDER — ACETAMINOPHEN 500MG 500 MG/1
650 TABLET, COATED ORAL EVERY 6 HOURS
Refills: 0 | Status: DISCONTINUED | OUTPATIENT
Start: 2024-11-20 | End: 2024-11-22

## 2024-11-20 RX ORDER — ENOXAPARIN SODIUM 30 MG/.3ML
40 INJECTION SUBCUTANEOUS EVERY 24 HOURS
Refills: 0 | Status: DISCONTINUED | OUTPATIENT
Start: 2024-11-21 | End: 2024-11-22

## 2024-11-20 RX ADMIN — TETANUS TOXOID, REDUCED DIPHTHERIA TOXOID AND ACELLULAR PERTUSSIS VACCINE, ADSORBED 0.5 MILLILITER(S): 5; 2.5; 8; 8; 2.5 SUSPENSION INTRAMUSCULAR at 13:02

## 2024-11-20 RX ADMIN — ACETAMINOPHEN 500MG 260 MILLIGRAM(S): 500 TABLET, COATED ORAL at 21:00

## 2024-11-20 RX ADMIN — KETOROLAC TROMETHAMINE 15 MILLIGRAM(S): 30 INJECTION INTRAMUSCULAR; INTRAVENOUS at 22:11

## 2024-11-20 RX ADMIN — ACETAMINOPHEN 500MG 400 MILLIGRAM(S): 500 TABLET, COATED ORAL at 13:02

## 2024-11-20 RX ADMIN — Medication 20 MILLIGRAM(S): at 23:41

## 2024-11-20 RX ADMIN — PREDNISONE 3 MILLIGRAM(S): 20 TABLET ORAL at 23:42

## 2024-11-20 RX ADMIN — PREGABALIN 200 MILLIGRAM(S): 75 CAPSULE ORAL at 23:41

## 2024-11-20 NOTE — H&P ADULT - PROBLEM SELECTOR PLAN 1
Subacute  Imaging: Subacute left third and fourth rib fractures   Toradol 15mg IV Q8 PRN for severe pain (pt reports delirium with tramadol/oxy)  IS

## 2024-11-20 NOTE — CONSULT NOTE ADULT - TIME BILLING
- Review of records, telemetry, vital signs and daily labs.   - General and cardiovascular physical examination.  - Generation of cardiovascular treatment plan and completion of note .  - Coordination of care.      Patient was seen and examined by me on 11/20/24interim events noted,labs and radiology studies reviewed.  Anthony Orellana MD,FACC.  [id number].  [phone]

## 2024-11-20 NOTE — PROVIDER CONTACT NOTE (OTHER) - ACTION/TREATMENT ORDERED:
Face to face report given with opportunity to observe patient.    Report given to Gordon RICK RN & Nidia Lim RN   12/17/2020  7:04 AM       ok to give lyrica. will recheck blood pressure after, and give fluids along with food

## 2024-11-20 NOTE — ED PROVIDER NOTE - CLINICAL SUMMARY MEDICAL DECISION MAKING FREE TEXT BOX
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 HD stable, well appearing in no acute distress. Imp: Mechanical fall, r/o acute traumatic injuries. Plan for CTs and Xrays, analgesics, reassess. 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 HD stable, well appearing in no acute distress. Imp: Mechanical fall, r/o acute traumatic injuries. Plan for CTs and Xrays, analgesics, reassess.

## 2024-11-20 NOTE — ED ADULT NURSE REASSESSMENT NOTE - NS ED NURSE REASSESS COMMENT FT1
Report received from day RN: pt resting comfortably in stretcher, breathing even and unlabored. Offers no complaints at this time. Instructed to call for assistance. Stretcher in lowest position, wheels locked, appropriate side rails in place, call bell in reach. Report given to FRANCOU2 RN.

## 2024-11-20 NOTE — H&P ADULT - HISTORY OF PRESENT ILLNESS
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 Pt states she was walking back from the bathroom when her legs felt weak and gave way resulting in her falling onto her left hand side. Reports pain to her left arm and ribcage region. Denies head injury/LOC.   Denies  headache, dizziness, chest pain, palpitations, SOB, abdominal pain, diarrhea/constipation, urinary symptoms.   Vitals: T 99.1, HR 81, /72, RR 18 satting 97% RA

## 2024-11-20 NOTE — H&P ADULT - NSICDXPASTSURGICALHX_GEN_ALL_CORE_FT
PAST SURGICAL HISTORY:  Cancer of Mandible left side reconstructive surgery with bone graft from left leg 2008    H/O right breast biopsy 1980's    Hardware complicating wound infection S/P removal of hardware 6/2008    History of cataract left and right ; 6/2017, 12/2017    S/P biopsy tongue lesion 7/2014     Retention Suture Text: Retention sutures were placed to support the closure and prevent dehiscence.

## 2024-11-20 NOTE — CONSULT NOTE ADULT - ASSESSMENT
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 Pt states that she was using her walker, her legs gave out on her, and fell to the ground. She denies feeling dizzy, or having chest pain or shortness of breath, She also denies any focal weakness, numbness or tingling sensation, urinary/bowel incontinence, loss of consciousness, abdominal pain, fevers and chills. Denies any other complaints.

## 2024-11-20 NOTE — PROVIDER CONTACT NOTE (OTHER) - ASSESSMENT
Pt AxOx4. pt's BP is 100/47. Pt w/ lightheadedness. Pt also has lyrica ordered. Pt states that she thinks the lightheadedness is from not eating or drinking all day

## 2024-11-20 NOTE — H&P ADULT - NSHPADDITIONALINFOADULT_GEN_ALL_CORE
Medication list obtained from pt at bedside with recent doses corrected based on surescript and pt's confirmation of those doses    istop Reference #: [id number]    B	Y		10/23/2024	10/23/2024	pregabalin 200 mg capsule	30	30	Agni Garsia	[id number]  Payment Method Other  [facility] [employer] [id number]  B	N		09/20/2024	09/27/2024	pregabalin 200 mg capsule	30	30	Angi Garsia	[id number]  Payment Method Other  [facility] [employer] [id number]  B	N	O	09/01/2024	09/01/2024	tramadol hcl 50 mg tablet	30	7	Anthony Orellana (MD)	[id number]  Payment Method Other  [facility] [employer] [id number]  B	N	O	09/01/2024	09/01/2024	oxycodone hcl (ir) 5 mg tablet	30	5	Anthony Orellana)	[id number]  Payment Method Other  [facility] [employer] [id number]  B	N		09/01/2024	09/01/2024	pregabalin 200 mg capsule	30	30	Anthony Orellana)	[id number]

## 2024-11-20 NOTE — H&P ADULT - NSHPREVIEWOFSYSTEMS_GEN_ALL_CORE
REVIEW OF SYSTEMS:    CONSTITUTIONAL: No weakness, fevers or chills + fall  EYES/ENT: No visual changes;  No dysphagia; No sore throat; No rhinorrhea; No sinus pain/pressure  NECK: No pain or stiffness  RESPIRATORY: No cough, wheezing, hemoptysis; No shortness of breath  CARDIOVASCULAR: No chest pain or palpitations; No lower extremity edema  GASTROINTESTINAL: No abdominal or epigastric pain. No nausea, vomiting, or hematemesis; No diarrhea or constipation. No melena or hematochezia.  GENITOURINARY: No dysuria, frequency or hematuria  NEUROLOGICAL: No numbness or weakness  MSK: ambulates with a walker,   SKIN: No itching, burning, rashes, or lesions   All other review of systems is negative unless indicated above.

## 2024-11-20 NOTE — H&P ADULT - NSICDXPASTMEDICALHX_GEN_ALL_CORE_FT
PAST MEDICAL HISTORY:  Basal Cell Cancer biopsy of nose  5/2010    Benign essential HTN     History of radiation therapy 2008    Hyperlipidemia     Hypothyroid     Malignant neoplasm of mandible left- 2008    Malignant neoplasm of tongue     Oral Cancer     Polymyalgia rheumatica     Radiation dermatitis left neck    Radiation therapy complication burn to left neck area

## 2024-11-20 NOTE — H&P ADULT - NSHPLABSRESULTS_GEN_ALL_CORE
9.6    4.20  )-----------( 191      ( 2024 13:08 )             31.8     140  |  107  |  20  ----------------------------<  90       4.2   |  24  |  0.85    Ca    8.0[L]      2024 13:08  Phos  2.1       Mg     1.80         TPro  5.8[L]  /  Alb  3.5  /  TBili  0.2  /  DBili  x   /  AST  15  /  ALT  12  /  AlkPhos  59          13:08 - VBG - pH: 7.34  | pCO2: 46    | pO2: 24    | Lactate: 0.8                        Urinalysis Basic - ( 2024 16:50 )  Color: Yellow / Appearance: Clear / S.013 / pH: 7.0  Gluc: Negative mg/dL / Ketone: Negative mg/dL  / Bili: Negative / Urobili: 0.2 mg/dL   Blood: Negative / Protein: Negative mg/dL / Nitrite: Negative   Leuk Esterase: Negative / RBC: x / WBC x   Sq Epi: x / Bacteria: x  Hyaline Casts: x/WBC Casts: x    EKG interpreted by myself: nonischemic   images interpreted by radiology:   xray left wrist: Status post interval removal of the previously seen plaster splint. Redemonstration of a transversely oriented fracture at the distal radial metaphysis. There is interval impaction of fracture fragments at this site with surrounding sclerosis. Findings may be related to an acute on chronic fracture of the distal radius. Redemonstration of a fracture at the base of the ulnar styloid. Alignment and position of fracture fragments is unchanged. No evidence of osseous bridging. Basilar and scaphotrapezial trapezoidal joint arthrosis.    xray bilateral hips and pelvis: Patient is status post open reduction internal fixation of the left hip with a femoral neck compression screw and short femoral intramedullary mari with distal interlocking screw. There is sclerosis about the fracture margins. The alignment is anatomic. No new fracture is seen. Severe right hip arthrosis. Lower lumbar spondylosis.    CT head:  -No acute intracranial findings.    CT cervical spine:  -No acute fracture or dislocation.  -Chronic left anterior mandible fracture/nonunion, similar to CT neck 2021.    CT c/a/p with contrast: No evidence of acute traumatic injury  Subacute left upper rib fractures are seen.

## 2024-11-20 NOTE — ED PROVIDER NOTE - OBJECTIVE STATEMENT
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 Pt states that she was using her walker, her legs gave out on her, and fell to the ground. She denies feeling dizzy, or having chest pain or shortness of breath, She also denies any focal weakness, numbness or tingling sensation, urinary/bowel incontinence, loss of consciousness, abdominal pain, fevers and chills. Denies any other complaints.

## 2024-11-20 NOTE — H&P ADULT - NSHPPHYSICALEXAM_GEN_ALL_CORE
PHYSICAL EXAM:  GENERAL: NAD, comfortable at bedside   HEAD:  Atraumatic, Normocephalic  EYES: EOMI, PERRL, conjunctiva and sclera clear  NECK: Supple, No JVD  CHEST/LUNG: Clear to auscultation bilaterally; No wheezes, rales or rhonchi  HEART: Regular rate and rhythm; + murmurs, no rubs, or gallops, (+)S1, S2  ABDOMEN: Soft, Nontender, Nondistended; Normal Bowel sounds   EXTREMITIES:  2+ Peripheral Pulses, No clubbing, cyanosis, or edema  PSYCH: normal mood and affect  NEUROLOGY: AAOx3, moving all extremities spontaneously   SKIN: left arm dressed - pt reports minor abrasions

## 2024-11-20 NOTE — ED PROVIDER NOTE - PROGRESS NOTE DETAILS
KIERSTEN Garcia: imaging studies with no acute injuries. Pt with difficulties ambulating and in pain. Will admit to medicine for PT eval and pain control.

## 2024-11-20 NOTE — H&P ADULT - NSHPSOCIALHISTORY_GEN_ALL_CORE
Does not use tobacco products (stopped >20 yrs ago), consume alcohol regularly or partake in illicit drug use   Lives with  and son

## 2024-11-20 NOTE — ED ADULT NURSE NOTE - OBJECTIVE STATEMENT
Pt A&Ox4 to ed s/p fall this morning, states her legs gave out. Left hand abrasion noted due to strike on walker. ambulatory with walker at baseline. Pt on plavix. complains of right groin pain radiating to lower back. Pt s/p left wrist and hip surgery this past august per Pt. Pt with multiple ecchymotic areas to legs arm and torso. pt denies chest pain, sob, n/v, dizziness. respirations even and unlabored. Hard of hearing bilaterally. no acute distress noted at this time. plan of care continued.

## 2024-11-20 NOTE — ED ADULT NURSE REASSESSMENT NOTE - NS ED NURSE REASSESS COMMENT FT1
Pt. lying in bed comfortably at this time. Awaiting results. Vitals stable, breathing well on RA. Respirations even and unlabored. Will continue to monitor.

## 2024-11-20 NOTE — ED ADULT TRIAGE NOTE - CHIEF COMPLAINT QUOTE
Pt s/p fall stats her legs gave out.  Pt on plavix , injured her left wrist. Pt s/p  left wrist and hip surgery this past august per Pt.  Pt with multiple ecchymotic areas to legs arm and torso. pt with no co chest pain no sob Pt co pain to left rib

## 2024-11-20 NOTE — CONSULT NOTE ADULT - SUBJECTIVE AND OBJECTIVE BOX
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 Patient underwent IMN of left hip with Dr. Miller on 8/28. Left distal radius fx was treated non-operatively in a sugartong splint. Patient now presents s/p mechanical fall. Patient denies any pain in her left wrist. Says it feels at her baseline and that her range of motion of the wrist is at baseline. Denies headstrike or LOC. Denies numbness/tingling in the LUE. Denies any other trauma/injuries at this time.    ROS  Negative unless otherwise specified in HPI.    PAST MEDICAL & SURGICAL Hx  PAST MEDICAL & SURGICAL HISTORY:  Hypothyroid      Hyperlipidemia      Oral Cancer      Basal Cell Cancer  biopsy of nose  5/2010      Malignant neoplasm of mandible  left- 2008      Malignant neoplasm of tongue      Radiation therapy complication  burn to left neck area      History of radiation therapy  2008      Radiation dermatitis  left neck      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      Hardware complicating wound infection  S/P removal of hardware 6/2008      S/P biopsy  tongue lesion 7/2014      H/O right breast biopsy  1980's      History of cataract  left and right ; 6/2017, 12/2017          MEDICATIONS  Home Medications:  acetaminophen 325 mg oral tablet: 3 tab(s) orally every 8 hours (31 Aug 2024 08:04)  cholecalciferol oral tablet: 1000 unit(s) orally once a day (31 Aug 2024 08:04)  cyclobenzaprine 10 mg oral tablet: 1 tab(s) orally 3 times a day As needed Muscle Spasm (31 Aug 2024 08:04)  enoxaparin: 40 milligram(s) subcutaneous once a day (31 Aug 2024 08:04)  losartan 100 mg oral tablet: 1 tab(s) orally once a day (31 Aug 2024 08:04)  ondansetron 4 mg oral tablet: 1 tab(s) orally every 6 hours As needed Nausea and/or Vomiting (31 Aug 2024 08:04)  oxyCODONE 5 mg oral tablet: 1 tab(s) orally every 4 hours As needed Moderate Pain (4 - 6) (31 Aug 2024 08:05)  predniSONE 1 mg oral tablet: 4 tab(s) orally once a day everyday at 6 pm taper as per RA (30 Nov 2023 15:56)  predniSONE 5 mg oral tablet: 1 tab(s) orally once a day every day at 6 am, taper as per RA (30 Nov 2023 15:56)  pregabalin 200 mg oral capsule: 1 cap(s) orally once a day (at bedtime) (26 Nov 2023 09:55)  simvastatin 40 mg oral tablet: 1 tab(s) orally (26 Nov 2023 09:55)  Synthroid 125 mcg (0.125 mg) oral tablet: 1 tab(s) orally once a day in am (26 Nov 2023 09:56)  traMADol 50 mg oral tablet: 1 tab(s) orally every 6 hours As needed Mild Pain (1 - 3) (31 Aug 2024 10:51)      ALLERGIES  No Known Allergies      FAMILY Hx  FAMILY HISTORY:  Family history of MI (myocardial infarction) (Father)        SOCIAL Hx  Social History:      VITALS  Vital Signs Last 24 Hrs  T(C): 37.3 (20 Nov 2024 16:45), Max: 37.3 (20 Nov 2024 16:45)  T(F): 99.1 (20 Nov 2024 16:45), Max: 99.1 (20 Nov 2024 16:45)  HR: 81 (20 Nov 2024 16:45) (81 - 88)  BP: 137/72 (20 Nov 2024 16:45) (137/72 - 149/70)  BP(mean): --  RR: 18 (20 Nov 2024 16:45) (18 - 18)  SpO2: 97% (20 Nov 2024 16:45) (97% - 99%)    Parameters below as of 20 Nov 2024 16:45  Patient On (Oxygen Delivery Method): room air        PHYSICAL EXAM  Gen: Lying in bed, NAD  Resp: No increased WOB  LUE:  Skin tear over dorsum of hand  No TTP over wrist, no TTP along remainder of extremity; compartments soft  Full ROM of wrist  Motor: AIN/PIN/U intact  Sensory: Med/Rad/U SILT  +Rad pulse, WWP    Secondary survey:  No TTP along spine or other extremities, pelvis grossly stable, SILT and soft compartments throughout    LABS                        9.6    4.20  )-----------( 191      ( 20 Nov 2024 13:08 )             31.8     11-20    140  |  107  |  20  ----------------------------<  90  4.2   |  24  |  0.85    Ca    8.0[L]      20 Nov 2024 13:08  Phos  2.1     11-20  Mg     1.80     11-20    TPro  5.8[L]  /  Alb  3.5  /  TBili  0.2  /  DBili  x   /  AST  15  /  ALT  12  /  AlkPhos  59  11-20        IMAGING  XRs: L chronic distal radius fx (personal read)    ASSESSMENT & PLAN  84yFemale w/ L chronic distal radius fx s/p closed reduction and immobilization on 8/28. Presents after another fall. Denies any pain in wrist. Patient being admitted for rehab placement.   -Removable wrist splint for comfort  -pain control  -ice/cold compress, elevation  -finger ROM encouraged to prevent stiffness  -no acute ortho surgery at this time  -f/u outpt with Dr. Miller within 1 week of discharge home, call office for appt

## 2024-11-20 NOTE — CONSULT NOTE ADULT - SUBJECTIVE AND OBJECTIVE BOX
PATIENT SEEN AND EXAMINED BY ISELA LOPEZ M.D. ON :- 24  DATE OF SERVICE:       24      Interim events noted,Labs ,Radiological studies and Cardiology tests reviewed .    Patient is a 84y old  Female who presents with a chief complaint of     HPI:      PAST MEDICAL & SURGICAL HISTORY:  Hypothyroid      Hyperlipidemia      Oral Cancer      Basal Cell Cancer  biopsy of nose  2010      Malignant neoplasm of mandible  left-       Malignant neoplasm of tongue      Radiation therapy complication  burn to left neck area      History of radiation therapy        Radiation dermatitis  left neck      Benign essential HTN      Polymyalgia rheumatica      Cancer of Mandible  left side reconstructive surgery with bone graft from left leg       Hardware complicating wound infection  S/P removal of hardware 2008      S/P biopsy  tongue lesion 2014      H/O right breast biopsy        History of cataract  left and right ; 2017, 2017          PREVIOUS DIAGNOSTIC TESTING:      ECHO  FINDINGS:    STRESS  FINDINGS:    CATHETERIZATION  FINDINGS:    MEDICATIONS  (STANDING):  ketorolac   Injectable 15 milliGRAM(s) IV Push once    MEDICATIONS  (PRN):  acetaminophen     Tablet .. 650 milliGRAM(s) Oral every 6 hours PRN Temp greater or equal to 38C (100.4F), Mild Pain (1 - 3)  aluminum hydroxide/magnesium hydroxide/simethicone Suspension 30 milliLiter(s) Oral every 4 hours PRN Dyspepsia  ketorolac   Injectable 15 milliGRAM(s) IV Push every 8 hours PRN Severe Pain (7 - 10)  melatonin 3 milliGRAM(s) Oral at bedtime PRN Insomnia  ondansetron Injectable 4 milliGRAM(s) IV Push every 8 hours PRN Nausea and/or Vomiting      FAMILY HISTORY:  Family history of MI (myocardial infarction) (Father)        SOCIAL HISTORY:    CIGARETTES:    ALCOHOL:    REVIEW OF SYSTEMS:  CONSTITUTIONAL: No fever, weight loss, or fatigue  EYES: No eye pain, visual disturbances, or discharge  ENMT:  No difficulty hearing, tinnitus, vertigo; No sinus or throat pain  NECK: No pain or stiffness  RESPIRATORY: No cough, wheezing, chills or hemoptysis; No shortness of breath  CARDIOVASCULAR: No chest pain, palpitations, dizziness, or leg swelling  GASTROINTESTINAL: No abdominal or epigastric pain. No nausea, vomiting, or hematemesis; No diarrhea or constipation. No melena or hematochezia.  GENITOURINARY: No dysuria, frequency, hematuria, or incontinence  NEUROLOGICAL: No headaches, memory loss, loss of strength, numbness, or tremors  SKIN: No itching, burning, rashes, or lesions   LYMPH NODES: No enlarged glands  ENDOCRINE: No heat or cold intolerance; No hair loss  MUSCULOSKELETAL: No joint pain or swelling; No muscle, back, or extremity pain  PSYCHIATRIC: No depression, anxiety, mood swings, or difficulty sleeping  HEME/LYMPH: No easy bruising, or bleeding gums  ALLERY AND IMMUNOLOGIC: No hives or eczema    Vital Signs Last 24 Hrs  T(C): 37.4 (2024 21:30), Max: 37.6 (2024 20:46)  T(F): 99.3 (2024 21:30), Max: 99.6 (2024 20:46)  HR: 91 (2024 21:30) (81 - 91)  BP: 110/50 (2024 21:30) (110/50 - 149/70)  BP(mean): --  RR: 18 (2024 21:30) (18 - 18)  SpO2: 97% (2024 21:30) (97% - 99%)    Parameters below as of 2024 21:30  Patient On (Oxygen Delivery Method): room air          PHYSICAL EXAM:  GENERAL: NAD, well-groomed, well-developed  HEAD:  Atraumatic, Normocephalic  EYES: EOMI, PERRLA, conjunctiva and sclera clear  ENMT: No tonsillar erythema, exudates, or enlargement; Moist mucous membranes, Good dentition, No lesions  NECK: Supple, No JVD, Normal thyroid  NERVOUS SYSTEM:  Alert & Oriented X3, Good concentration; Motor Strength 5/5 B/L upper and lower extremities; DTRs 2+ intact and symmetric  CHEST/LUNG: Clear to percussion bilaterally; No rales, rhonchi, wheezing, or rubs  HEART: Regular rate and rhythm; No murmurs, rubs, or gallops  ABDOMEN: Soft, Nontender, Nondistended; Bowel sounds present  EXTREMITIES:  2+ Peripheral Pulses, No clubbing, cyanosis, or edema  LYMPH: No lymphadenopathy noted  SKIN: No rashes or lesions      INTERPRETATION OF TELEMETRY:    ECG:    [facility]:     LABS:                        9.6    4.20  )-----------( 191      ( 2024 13:08 )             31.8     11-20    140  |  107  |  20  ----------------------------<  90  4.2   |  24  |  0.85    Ca    8.0[L]      2024 13:08  Phos  2.1       Mg     1.80         TPro  5.8[L]  /  Alb  3.5  /  TBili  0.2  /  DBili  x   /  AST  15  /  ALT  12  /  AlkPhos  59            Urinalysis Basic - ( 2024 16:50 )    Color: Yellow / Appearance: Clear / S.013 / pH: x  Gluc: x / Ketone: Negative mg/dL  / Bili: Negative / Urobili: 0.2 mg/dL   Blood: x / Protein: Negative mg/dL / Nitrite: Negative   Leuk Esterase: Negative / RBC: x / WBC x   Sq Epi: x / Non Sq Epi: x / Bacteria: x      Lipid Panel:   I&O's Summary      RADIOLOGY & ADDITIONAL STUDIES:

## 2024-11-20 NOTE — H&P ADULT - CONVERSATION DETAILS
Pt IS agreeable to:  Noninvasive mechanical ventilation , IV fluids, Antibiotics, Blood transfusions     Pt NOT agreeable to: CPR, Intubation and mechanical ventilation, Feeding tube    Pt would like to decide if/when the need were to arise for: Dialysis

## 2024-11-21 LAB
A1C WITH ESTIMATED AVERAGE GLUCOSE RESULT: 4.7 % — SIGNIFICANT CHANGE UP (ref 4–5.6)
ANION GAP SERPL CALC-SCNC: 12 MMOL/L — SIGNIFICANT CHANGE UP (ref 7–14)
BASOPHILS # BLD AUTO: 0 K/UL — SIGNIFICANT CHANGE UP (ref 0–0.2)
BASOPHILS NFR BLD AUTO: 0 % — SIGNIFICANT CHANGE UP (ref 0–2)
BUN SERPL-MCNC: 19 MG/DL — SIGNIFICANT CHANGE UP (ref 7–23)
CALCIUM SERPL-MCNC: 7.5 MG/DL — LOW (ref 8.4–10.5)
CHLORIDE SERPL-SCNC: 109 MMOL/L — HIGH (ref 98–107)
CHOLEST SERPL-MCNC: 140 MG/DL — SIGNIFICANT CHANGE UP
CO2 SERPL-SCNC: 20 MMOL/L — LOW (ref 22–31)
CREAT SERPL-MCNC: 0.93 MG/DL — SIGNIFICANT CHANGE UP (ref 0.5–1.3)
CULTURE RESULTS: SIGNIFICANT CHANGE UP
EGFR: 60 ML/MIN/1.73M2 — SIGNIFICANT CHANGE UP
EOSINOPHIL # BLD AUTO: 0 K/UL — SIGNIFICANT CHANGE UP (ref 0–0.5)
EOSINOPHIL NFR BLD AUTO: 0 % — SIGNIFICANT CHANGE UP (ref 0–6)
ESTIMATED AVERAGE GLUCOSE: 88 — SIGNIFICANT CHANGE UP
GLUCOSE SERPL-MCNC: 84 MG/DL — SIGNIFICANT CHANGE UP (ref 70–99)
HCT VFR BLD CALC: 31.3 % — LOW (ref 34.5–45)
HDLC SERPL-MCNC: 48 MG/DL — LOW
HGB BLD-MCNC: 9.5 G/DL — LOW (ref 11.5–15.5)
IANC: 2.19 K/UL — SIGNIFICANT CHANGE UP (ref 1.8–7.4)
IMM GRANULOCYTES NFR BLD AUTO: 0.7 % — SIGNIFICANT CHANGE UP (ref 0–0.9)
LIPID PNL WITH DIRECT LDL SERPL: 68 MG/DL — SIGNIFICANT CHANGE UP
LYMPHOCYTES # BLD AUTO: 0.31 K/UL — LOW (ref 1–3.3)
LYMPHOCYTES # BLD AUTO: 11.4 % — LOW (ref 13–44)
MCHC RBC-ENTMCNC: 24.9 PG — LOW (ref 27–34)
MCHC RBC-ENTMCNC: 30.4 G/DL — LOW (ref 32–36)
MCV RBC AUTO: 81.9 FL — SIGNIFICANT CHANGE UP (ref 80–100)
MONOCYTES # BLD AUTO: 0.2 K/UL — SIGNIFICANT CHANGE UP (ref 0–0.9)
MONOCYTES NFR BLD AUTO: 7.4 % — SIGNIFICANT CHANGE UP (ref 2–14)
MRSA PCR RESULT.: SIGNIFICANT CHANGE UP
NEUTROPHILS # BLD AUTO: 2.19 K/UL — SIGNIFICANT CHANGE UP (ref 1.8–7.4)
NEUTROPHILS NFR BLD AUTO: 80.5 % — HIGH (ref 43–77)
NON HDL CHOLESTEROL: 92 MG/DL — SIGNIFICANT CHANGE UP
NRBC # BLD: 0 /100 WBCS — SIGNIFICANT CHANGE UP (ref 0–0)
NRBC # FLD: 0 K/UL — SIGNIFICANT CHANGE UP (ref 0–0)
PLATELET # BLD AUTO: 182 K/UL — SIGNIFICANT CHANGE UP (ref 150–400)
POTASSIUM SERPL-MCNC: 4 MMOL/L — SIGNIFICANT CHANGE UP (ref 3.5–5.3)
POTASSIUM SERPL-SCNC: 4 MMOL/L — SIGNIFICANT CHANGE UP (ref 3.5–5.3)
RBC # BLD: 3.82 M/UL — SIGNIFICANT CHANGE UP (ref 3.8–5.2)
RBC # FLD: 16 % — HIGH (ref 10.3–14.5)
S AUREUS DNA NOSE QL NAA+PROBE: SIGNIFICANT CHANGE UP
SODIUM SERPL-SCNC: 141 MMOL/L — SIGNIFICANT CHANGE UP (ref 135–145)
SPECIMEN SOURCE: SIGNIFICANT CHANGE UP
T4 FREE SERPL-MCNC: 1.3 NG/DL — SIGNIFICANT CHANGE UP (ref 0.9–1.8)
T4 FREE+ TSH PNL SERPL: 0.15 UIU/ML — LOW (ref 0.27–4.2)
TRIGL SERPL-MCNC: 118 MG/DL — SIGNIFICANT CHANGE UP
WBC # BLD: 2.72 K/UL — LOW (ref 3.8–10.5)
WBC # FLD AUTO: 2.72 K/UL — LOW (ref 3.8–10.5)

## 2024-11-21 RX ORDER — PREGABALIN 75 MG/1
200 CAPSULE ORAL AT BEDTIME
Refills: 0 | Status: DISCONTINUED | OUTPATIENT
Start: 2024-11-21 | End: 2024-11-22

## 2024-11-21 RX ADMIN — KETOROLAC TROMETHAMINE 15 MILLIGRAM(S): 30 INJECTION INTRAMUSCULAR; INTRAVENOUS at 13:00

## 2024-11-21 RX ADMIN — Medication 125 MICROGRAM(S): at 06:10

## 2024-11-21 RX ADMIN — CLOPIDOGREL 75 MILLIGRAM(S): 75 TABLET, FILM COATED ORAL at 12:10

## 2024-11-21 RX ADMIN — PREDNISONE 4 MILLIGRAM(S): 20 TABLET ORAL at 06:10

## 2024-11-21 RX ADMIN — PREDNISONE 3 MILLIGRAM(S): 20 TABLET ORAL at 21:59

## 2024-11-21 RX ADMIN — PREGABALIN 200 MILLIGRAM(S): 75 CAPSULE ORAL at 21:58

## 2024-11-21 RX ADMIN — ENOXAPARIN SODIUM 40 MILLIGRAM(S): 30 INJECTION SUBCUTANEOUS at 18:18

## 2024-11-21 RX ADMIN — KETOROLAC TROMETHAMINE 15 MILLIGRAM(S): 30 INJECTION INTRAMUSCULAR; INTRAVENOUS at 12:10

## 2024-11-21 RX ADMIN — LOSARTAN POTASSIUM 100 MILLIGRAM(S): 100 TABLET, FILM COATED ORAL at 12:10

## 2024-11-21 RX ADMIN — Medication 20 MILLIGRAM(S): at 21:59

## 2024-11-21 NOTE — PHYSICAL THERAPY INITIAL EVALUATION ADULT - GAIT DEVIATIONS NOTED, PT EVAL
forward flexed posture with downward gaze/decreased srinivas/decreased velocity of limb motion/decreased step length/decreased stride length/decreased weight-shifting ability

## 2024-11-21 NOTE — PHYSICAL THERAPY INITIAL EVALUATION ADULT - PERTINENT HX OF CURRENT PROBLEM, REHAB EVAL
Pt is an 84 year old female presenting s/p fall. Pt states she was walking back from the bathroom when her legs felt weak and gave way resulting in her falling onto her left hand side. Of note, patient recently admitted 08/2024 s/p fall with left intertrochanteric hip fracture and a left distal radius fracture and discharged to [facility]. Left wrist XR redemonstrated transversely oriented fracture at the distal radial metaphysis and interval impaction of fracture fragments at this site with surrounding sclerosis; findings may be related to an acute on chronic fracture of the distal radius. Bilateral hip and pelvis negative for acute fractures. CT cervical spine showed chronic left anterior mandible fracture/nonunion. CT chest showed subacute left upper rib fractures. CT head negative for acute findings. Pt admitted for fall.

## 2024-11-21 NOTE — PHYSICAL THERAPY INITIAL EVALUATION ADULT - ADDITIONAL COMMENTS
Patient lives in a private house with her  and son; there are no steps to enter, 12 steps to bedroom. Has a home health aide 4 hrs/day x 3 day/wk. Family provides support remaining hours as needed.     Has 2-3 rollators, shower chair, commode, and wheelchair.

## 2024-11-21 NOTE — PROGRESS NOTE ADULT - TIME BILLING
- Review of records, telemetry, vital signs and daily labs.   - General and cardiovascular physical examination.  - Generation of cardiovascular treatment plan and completion of note .  - Coordination of care.      Patient was seen and examined by me on 11/21/24 ,interim events noted,labs and radiology studies reviewed.  Anthony Orellana MD,FACC.  [id number] [facility].  [phone]

## 2024-11-21 NOTE — PROGRESS NOTE ADULT - SUBJECTIVE AND OBJECTIVE BOX
PATIENT SEEN AND EXAMINED BY ISELA LOPEZ M.D. ON :- 11/21/24  DATE OF SERVICE:       11/21/24      Interim events noted,Labs ,Radiological studies and Cardiology tests reviewed .    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 Pt states she was walking back from the bathroom when her legs felt weak and gave way resulting in her falling onto her left hand side. Reports pain to her left arm and ribcage region. Denies head injury/LOC.   Denies  headache, dizziness, chest pain, palpitations, SOB, abdominal pain, diarrhea/constipation, urinary symptoms.   Vitals: T 99.1, HR 81, /72, RR 18 satting 97% RA (20 Nov 2024 21:42)      PAST MEDICAL & SURGICAL HISTORY:  Hypothyroid      Hyperlipidemia      Oral Cancer      Basal Cell Cancer  biopsy of nose  5/2010      Malignant neoplasm of mandible  left- 2008      Malignant neoplasm of tongue      Radiation therapy complication  burn to left neck area      History of radiation therapy  2008      Radiation dermatitis  left neck      Benign essential HTN      Polymyalgia rheumatica      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      Hardware complicating wound infection  S/P removal of hardware 6/2008      S/P biopsy  tongue lesion 7/2014      H/O right breast biopsy  1980's      History of cataract  left and right ; 6/2017, 12/2017          PREVIOUS DIAGNOSTIC TESTING:      ECHO  FINDINGS:    STRESS  FINDINGS:    CATHETERIZATION  FINDINGS:    MEDICATIONS  (STANDING):  atorvastatin 20 milliGRAM(s) Oral at bedtime  clopidogrel Tablet 75 milliGRAM(s) Oral daily  enoxaparin Injectable 40 milliGRAM(s) SubCutaneous every 24 hours  levothyroxine 125 MICROGram(s) Oral daily  losartan 100 milliGRAM(s) Oral with breakfast  predniSONE   Tablet 4 milliGRAM(s) Oral daily  predniSONE   Tablet 3 milliGRAM(s) Oral at bedtime  pregabalin 200 milliGRAM(s) Oral at bedtime    MEDICATIONS  (PRN):  acetaminophen     Tablet .. 650 milliGRAM(s) Oral every 6 hours PRN Temp greater or equal to 38C (100.4F), Mild Pain (1 - 3)  aluminum hydroxide/magnesium hydroxide/simethicone Suspension 30 milliLiter(s) Oral every 4 hours PRN Dyspepsia  ketorolac   Injectable 15 milliGRAM(s) IV Push every 8 hours PRN Severe Pain (7 - 10)  melatonin 3 milliGRAM(s) Oral at bedtime PRN Insomnia  ondansetron Injectable 4 milliGRAM(s) IV Push every 8 hours PRN Nausea and/or Vomiting      FAMILY HISTORY:  Family history of MI (myocardial infarction) (Father)        SOCIAL HISTORY:    CIGARETTES:    ALCOHOL:    REVIEW OF SYSTEMS:  CONSTITUTIONAL: No fever, weight loss, or fatigue  EYES: No eye pain, visual disturbances, or discharge  ENMT:  No difficulty hearing, tinnitus, vertigo; No sinus or throat pain  NECK: No pain or stiffness  RESPIRATORY: No cough, wheezing, chills or hemoptysis; No shortness of breath  CARDIOVASCULAR: No chest pain, palpitations, dizziness, or leg swelling  GASTROINTESTINAL: No abdominal or epigastric pain. No nausea, vomiting, or hematemesis; No diarrhea or constipation. No melena or hematochezia.  GENITOURINARY: No dysuria, frequency, hematuria, or incontinence  NEUROLOGICAL: No headaches, memory loss, loss of strength, numbness, or tremors  SKIN: No itching, burning, rashes, or lesions   LYMPH NODES: No enlarged glands  ENDOCRINE: No heat or cold intolerance; No hair loss  MUSCULOSKELETAL: No joint pain or swelling; No muscle, back, or extremity pain  PSYCHIATRIC: No depression, anxiety, mood swings, or difficulty sleeping  HEME/LYMPH: No easy bruising, or bleeding gums  ALLERY AND IMMUNOLOGIC: No hives or eczema    Vital Signs Last 24 Hrs  T(C): 37.4 (21 Nov 2024 21:46), Max: 37.4 (21 Nov 2024 21:46)  T(F): 99.4 (21 Nov 2024 21:46), Max: 99.4 (21 Nov 2024 21:46)  HR: 89 (21 Nov 2024 21:46) (80 - 90)  BP: 124/58 (21 Nov 2024 21:46) (99/60 - 126/58)  BP(mean): --  RR: 17 (21 Nov 2024 21:46) (17 - 18)  SpO2: 96% (21 Nov 2024 21:46) (96% - 98%)    Parameters below as of 21 Nov 2024 21:46  Patient On (Oxygen Delivery Method): room air          PHYSICAL EXAM:  GENERAL: NAD, well-groomed, well-developed  HEAD:  Atraumatic, Normocephalic  EYES: EOMI, PERRLA, conjunctiva and sclera clear  ENMT: No tonsillar erythema, exudates, or enlargement; Moist mucous membranes, Good dentition, No lesions  NECK: Supple, No JVD, Normal thyroid  NERVOUS SYSTEM:  Alert & Oriented X3, Good concentration; Motor Strength 5/5 B/L upper and lower extremities; DTRs 2+ intact and symmetric  CHEST/LUNG: Clear to percussion bilaterally; No rales, rhonchi, wheezing, or rubs  HEART: Regular rate and rhythm; No murmurs, rubs, or gallops  ABDOMEN: Soft, Nontender, Nondistended; Bowel sounds present  EXTREMITIES:  2+ Peripheral Pulses, No clubbing, cyanosis, or edema  LYMPH: No lymphadenopathy noted  SKIN: No rashes or lesions      INTERPRETATION OF TELEMETRY:    ECG:    CHADSVASC:     LABS:                        9.5    2.72  )-----------( 182      ( 21 Nov 2024 06:40 )             31.3     11-21    141  |  109[H]  |  19  ----------------------------<  84  4.0   |  20[L]  |  0.93    Ca    7.5[L]      21 Nov 2024 06:40  Phos  2.1     11-20  Mg     1.80     11-20    TPro  5.8[L]  /  Alb  3.5  /  TBili  0.2  /  DBili  x   /  AST  15  /  ALT  12  /  AlkPhos  59  11-20          Urinalysis Basic - ( 21 Nov 2024 06:40 )    Color: x / Appearance: x / SG: x / pH: x  Gluc: 84 mg/dL / Ketone: x  / Bili: x / Urobili: x   Blood: x / Protein: x / Nitrite: x   Leuk Esterase: x / RBC: x / WBC x   Sq Epi: x / Non Sq Epi: x / Bacteria: x      Lipid Panel: Cholesterol, Serum 140  Direct LDL --  HDL Cholesterol, Serum 48  Triglycerides, Serum 118    I&O's Summary      RADIOLOGY & ADDITIONAL STUDIES:

## 2024-11-21 NOTE — PHYSICAL THERAPY INITIAL EVALUATION ADULT - GENERAL OBSERVATIONS, REHAB EVAL
Upon entry, pt semi-supine in bed in NAD. HR 80 bpm. Daughter present at bedside. Pt left seated in bedside chair with all tubes/lines intact, call bell in reach and in NAD.

## 2024-11-22 ENCOUNTER — TRANSCRIPTION ENCOUNTER (OUTPATIENT)
Age: 84
End: 2024-11-22

## 2024-11-22 VITALS
RESPIRATION RATE: 18 BRPM | TEMPERATURE: 98 F | DIASTOLIC BLOOD PRESSURE: 79 MMHG | OXYGEN SATURATION: 98 % | HEART RATE: 72 BPM | SYSTOLIC BLOOD PRESSURE: 114 MMHG

## 2024-11-22 LAB
ANION GAP SERPL CALC-SCNC: 10 MMOL/L — SIGNIFICANT CHANGE UP (ref 7–14)
BASOPHILS # BLD AUTO: 0.01 K/UL — SIGNIFICANT CHANGE UP (ref 0–0.2)
BASOPHILS NFR BLD AUTO: 0.4 % — SIGNIFICANT CHANGE UP (ref 0–2)
BUN SERPL-MCNC: 18 MG/DL — SIGNIFICANT CHANGE UP (ref 7–23)
CALCIUM SERPL-MCNC: 7.2 MG/DL — LOW (ref 8.4–10.5)
CHLORIDE SERPL-SCNC: 112 MMOL/L — HIGH (ref 98–107)
CO2 SERPL-SCNC: 20 MMOL/L — LOW (ref 22–31)
CREAT SERPL-MCNC: 0.86 MG/DL — SIGNIFICANT CHANGE UP (ref 0.5–1.3)
EGFR: 66 ML/MIN/1.73M2 — SIGNIFICANT CHANGE UP
EOSINOPHIL # BLD AUTO: 0.03 K/UL — SIGNIFICANT CHANGE UP (ref 0–0.5)
EOSINOPHIL NFR BLD AUTO: 1.2 % — SIGNIFICANT CHANGE UP (ref 0–6)
GLUCOSE SERPL-MCNC: 100 MG/DL — HIGH (ref 70–99)
HCT VFR BLD CALC: 31 % — LOW (ref 34.5–45)
HGB BLD-MCNC: 9.3 G/DL — LOW (ref 11.5–15.5)
IANC: 1.6 K/UL — LOW (ref 1.8–7.4)
IMM GRANULOCYTES NFR BLD AUTO: 0.8 % — SIGNIFICANT CHANGE UP (ref 0–0.9)
LYMPHOCYTES # BLD AUTO: 0.58 K/UL — LOW (ref 1–3.3)
LYMPHOCYTES # BLD AUTO: 22.6 % — SIGNIFICANT CHANGE UP (ref 13–44)
MCHC RBC-ENTMCNC: 24.8 PG — LOW (ref 27–34)
MCHC RBC-ENTMCNC: 30 G/DL — LOW (ref 32–36)
MCV RBC AUTO: 82.7 FL — SIGNIFICANT CHANGE UP (ref 80–100)
MONOCYTES # BLD AUTO: 0.33 K/UL — SIGNIFICANT CHANGE UP (ref 0–0.9)
MONOCYTES NFR BLD AUTO: 12.8 % — SIGNIFICANT CHANGE UP (ref 2–14)
NEUTROPHILS # BLD AUTO: 1.6 K/UL — LOW (ref 1.8–7.4)
NEUTROPHILS NFR BLD AUTO: 62.2 % — SIGNIFICANT CHANGE UP (ref 43–77)
NRBC # BLD: 0 /100 WBCS — SIGNIFICANT CHANGE UP (ref 0–0)
NRBC # FLD: 0 K/UL — SIGNIFICANT CHANGE UP (ref 0–0)
PLATELET # BLD AUTO: 170 K/UL — SIGNIFICANT CHANGE UP (ref 150–400)
POTASSIUM SERPL-MCNC: 4 MMOL/L — SIGNIFICANT CHANGE UP (ref 3.5–5.3)
POTASSIUM SERPL-SCNC: 4 MMOL/L — SIGNIFICANT CHANGE UP (ref 3.5–5.3)
RBC # BLD: 3.75 M/UL — LOW (ref 3.8–5.2)
RBC # FLD: 16 % — HIGH (ref 10.3–14.5)
SODIUM SERPL-SCNC: 142 MMOL/L — SIGNIFICANT CHANGE UP (ref 135–145)
WBC # BLD: 2.57 K/UL — LOW (ref 3.8–10.5)
WBC # FLD AUTO: 2.57 K/UL — LOW (ref 3.8–10.5)

## 2024-11-22 PROCEDURE — 73090 X-RAY EXAM OF FOREARM: CPT | Mod: 26,LT

## 2024-11-22 PROCEDURE — 73100 X-RAY EXAM OF WRIST: CPT | Mod: 26,LT

## 2024-11-22 RX ORDER — LIDOCAINE 40 MG/G
1 CREAM TOPICAL
Qty: 0 | Refills: 0 | DISCHARGE
Start: 2024-11-22

## 2024-11-22 RX ORDER — LIDOCAINE 40 MG/G
1 CREAM TOPICAL DAILY
Refills: 0 | Status: DISCONTINUED | OUTPATIENT
Start: 2024-11-22 | End: 2024-11-22

## 2024-11-22 RX ORDER — ACETAMINOPHEN, DIPHENHYDRAMINE HCL, PHENYLEPHRINE HCL 325; 25; 5 MG/1; MG/1; MG/1
1 TABLET ORAL
Qty: 0 | Refills: 0 | DISCHARGE
Start: 2024-11-22

## 2024-11-22 RX ORDER — ACETAMINOPHEN 500MG 500 MG/1
2 TABLET, COATED ORAL
Qty: 0 | Refills: 0 | DISCHARGE
Start: 2024-11-22

## 2024-11-22 RX ORDER — CHOLECALCIFEROL (VITAMIN D3) 10MCG/0.25
2000 DROPS ORAL DAILY
Refills: 0 | Status: DISCONTINUED | OUTPATIENT
Start: 2024-11-22 | End: 2024-11-22

## 2024-11-22 RX ORDER — CHOLECALCIFEROL (VITAMIN D3) 10MCG/0.25
2000 DROPS ORAL
Qty: 0 | Refills: 0 | DISCHARGE
Start: 2024-11-22

## 2024-11-22 RX ADMIN — Medication 2000 UNIT(S): at 13:35

## 2024-11-22 RX ADMIN — PREDNISONE 4 MILLIGRAM(S): 20 TABLET ORAL at 05:04

## 2024-11-22 RX ADMIN — KETOROLAC TROMETHAMINE 15 MILLIGRAM(S): 30 INJECTION INTRAMUSCULAR; INTRAVENOUS at 00:26

## 2024-11-22 RX ADMIN — LOSARTAN POTASSIUM 100 MILLIGRAM(S): 100 TABLET, FILM COATED ORAL at 09:40

## 2024-11-22 RX ADMIN — Medication 125 MICROGRAM(S): at 05:05

## 2024-11-22 RX ADMIN — CLOPIDOGREL 75 MILLIGRAM(S): 75 TABLET, FILM COATED ORAL at 09:40

## 2024-11-22 RX ADMIN — KETOROLAC TROMETHAMINE 15 MILLIGRAM(S): 30 INJECTION INTRAMUSCULAR; INTRAVENOUS at 01:00

## 2024-11-22 RX ADMIN — LIDOCAINE 1 PATCH: 40 CREAM TOPICAL at 13:36

## 2024-11-22 NOTE — DISCHARGE NOTE PROVIDER - HOSPITAL COURSE
84 yr old female presenting following a fall  Imaging showed Subacute left third and fourth rib fractures , Toradol 15mg IV Q8 PRN for severe pain (pt reports delirium with tramadol/oxy)Orthostatics negative,  PT consult- recommend sub acute rehab

## 2024-11-22 NOTE — DISCHARGE NOTE NURSING/CASE MANAGEMENT/SOCIAL WORK - NSDCVIVACCINE_GEN_ALL_CORE_FT
Tdap; 20-Nov-2024 13:02; Christi Chen (DALE); [employer]; [id number]   (Exp. Date: 08-May-2026); IntraMuscular; Deltoid Right.; 0.5 milliLiter(s); VIS (VIS Published: 09-May-2013, VIS Presented: 20-Nov-2024);

## 2024-11-22 NOTE — OCCUPATIONAL THERAPY INITIAL EVALUATION ADULT - MD ORDER
Occupational therapy to evaluate and treat. Occupational therapy to evaluate and treat.  Ambulate as tolerated- "left wrist WBAT"

## 2024-11-22 NOTE — DISCHARGE NOTE NURSING/CASE MANAGEMENT/SOCIAL WORK - FINANCIAL ASSISTANCE
[facility] provides services at a reduced cost to those who are determined to be eligible through [facility]’s financial assistance program. Information regarding [facility]’s financial assistance program can be found by going to [email or web address] or by calling [phone].

## 2024-11-22 NOTE — OCCUPATIONAL THERAPY INITIAL EVALUATION ADULT - PERTINENT HX OF CURRENT PROBLEM, REHAB EVAL
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 Found to have subacute left upper rib fracture and left ulnar styloid fracture.

## 2024-11-22 NOTE — DISCHARGE NOTE PROVIDER - NSDCMRMEDTOKEN_GEN_ALL_CORE_FT
cholecalciferol oral tablet: 1000 unit(s) orally once a day  losartan 100 mg oral tablet: 1 tab(s) orally once a day  Plavix 75 mg oral tablet: 1 tab(s) orally once a day  predniSONE 5 mg oral tablet: orally once a day 4mg AM and 3mg PM  pregabalin 200 mg oral capsule: 1 cap(s) orally once a day (at bedtime)  simvastatin 40 mg oral tablet: 1 tab(s) orally  Synthroid 125 mcg (0.125 mg) oral tablet: 1 tab(s) orally once a day in am   acetaminophen 325 mg oral tablet: 2 tab(s) orally every 6 hours as needed for pain  cholecalciferol oral tablet: 1000 unit(s) orally once a day  losartan 100 mg oral tablet: 1 tab(s) orally once a day  melatonin 3 mg oral tablet: 1 tab(s) orally once a day (at bedtime) As needed Insomnia  Plavix 75 mg oral tablet: 1 tab(s) orally once a day  predniSONE 5 mg oral tablet: orally once a day 4mg AM and 3mg PM  pregabalin 200 mg oral capsule: 1 cap(s) orally once a day (at bedtime)  simvastatin 40 mg oral tablet: 1 tab(s) orally  Synthroid 125 mcg (0.125 mg) oral tablet: 1 tab(s) orally once a day in am   acetaminophen 325 mg oral tablet: 2 tab(s) orally every 6 hours as needed for pain  cholecalciferol oral tablet: 2000 unit(s) orally once a day  lidocaine 4% topical film: Apply topically to affected area once a day  losartan 100 mg oral tablet: 1 tab(s) orally once a day  melatonin 3 mg oral tablet: 1 tab(s) orally once a day (at bedtime) As needed Insomnia  Plavix 75 mg oral tablet: 1 tab(s) orally once a day  predniSONE 5 mg oral tablet: orally once a day 4mg AM and 3mg PM  pregabalin 200 mg oral capsule: 1 cap(s) orally once a day (at bedtime)  simvastatin 40 mg oral tablet: 1 tab(s) orally  Synthroid 125 mcg (0.125 mg) oral tablet: 1 tab(s) orally once a day in am

## 2024-11-22 NOTE — DISCHARGE NOTE NURSING/CASE MANAGEMENT/SOCIAL WORK - PATIENT PORTAL LINK FT
You can access the FollowMyHealth Patient Portal offered by [facility] by registering at the following website: [email or web address]. By joining [employer]’s FollowMyHealth portal, you will also be able to view your health information using other applications (apps) compatible with our system.

## 2024-11-22 NOTE — DISCHARGE NOTE NURSING/CASE MANAGEMENT/SOCIAL WORK - NSDCPEFALRISK_GEN_ALL_CORE
For information on Fall & Injury Prevention, visit: [email or web address] OR  [email or web address] OR  https://www.cdc.gov/steadi/patient.html

## 2024-11-22 NOTE — DISCHARGE NOTE PROVIDER - NSDCCPCAREPLAN_GEN_ALL_CORE_FT
PRINCIPAL DISCHARGE DIAGNOSIS  Diagnosis: Fall  Assessment and Plan of Treatment: fall precautions, continue PT at rehab  -f/u outpt with Dr. Miller within 1 week of discharge home, call office for appt        SECONDARY DISCHARGE DIAGNOSES  Diagnosis: Polymyalgia rheumatica  Assessment and Plan of Treatment: Fall precautions  Continue prednisone 4mg in AM and 3mg PM  Continue pregabalin 200mg daily.    Diagnosis: Benign essential HTN  Assessment and Plan of Treatment: low salt diet , Continue losartan 100mg daily       Diagnosis: HLD (hyperlipidemia)  Assessment and Plan of Treatment: low fat diet Continue simvastatin 40mg daily    Diagnosis: Hypothyroidism  Assessment and Plan of Treatment: Continue levothyroxine 125mcg daily  take on an empty stomach    Diagnosis: Rib fracture  Assessment and Plan of Treatment: -no acute ortho surgery at this time  -f/u outpt with Dr. Miller within 1 week of discharge home,     PRINCIPAL DISCHARGE DIAGNOSIS  Diagnosis: Fall  Assessment and Plan of Treatment: fall precautions, continue PT at rehab  -f/u outpt with Dr. Miller within 1 week of discharge home, call office for appt        SECONDARY DISCHARGE DIAGNOSES  Diagnosis: Polymyalgia rheumatica  Assessment and Plan of Treatment: Fall precautions  Continue prednisone 4mg in AM and 3mg PM- please take it w/ food   Continue pregabalin 200mg daily.    Diagnosis: Benign essential HTN  Assessment and Plan of Treatment: low salt diet , Continue losartan 100mg daily       Diagnosis: HLD (hyperlipidemia)  Assessment and Plan of Treatment: low fat diet Continue simvastatin 40mg daily    Diagnosis: Hypothyroidism  Assessment and Plan of Treatment: Continue levothyroxine 125mcg daily  take on an empty stomach    Diagnosis: Rib fracture  Assessment and Plan of Treatment: -no acute ortho surgery at this time, may take Tylenol as needed and lidocaine patch   -f/u outpt with Dr. Miller within 1 week of discharge home,

## 2024-11-22 NOTE — OCCUPATIONAL THERAPY INITIAL EVALUATION ADULT - ADDITIONAL COMMENTS
Following evaluation, patient left semisupine on stretcher with transport in NAD. All lines intact. Patient reports having a home health aide 3 days x 4 hours to assist with ADLs and IADLs. Family assists when aide is not present. Reports being at subacute rehab with recent discharge home on 11/1/24.    Following evaluation, patient left semisupine on stretcher with transport in NAD. All lines intact.

## 2024-11-22 NOTE — DISCHARGE NOTE PROVIDER - NSDCFUSCHEDAPPT_GEN_ALL_CORE_FT
Barney Miller  [facility]  ORTHOSURG [address]  Scheduled Appointment: 12/19/2024    Kaylin Sparrow  [facility]  GENSUR [address]  Scheduled Appointment: 01/14/2025

## 2024-12-19 ENCOUNTER — APPOINTMENT (OUTPATIENT)
Dept: ORTHOPEDIC SURGERY | Facility: CLINIC | Age: 84
End: 2024-12-19
Payer: MEDICARE

## 2024-12-19 VITALS
WEIGHT: 156.8 LBS | SYSTOLIC BLOOD PRESSURE: 229 MMHG | HEIGHT: 63 IN | OXYGEN SATURATION: 96 % | HEART RATE: 94 BPM | BODY MASS INDEX: 27.78 KG/M2 | DIASTOLIC BLOOD PRESSURE: 94 MMHG

## 2024-12-19 DIAGNOSIS — S72.142A DISPLACED INTERTROCHANTERIC FRACTURE OF LEFT FEMUR, INITIAL ENCOUNTER FOR CLOSED FRACTURE: ICD-10-CM

## 2024-12-19 PROCEDURE — 73502 X-RAY EXAM HIP UNI 2-3 VIEWS: CPT

## 2024-12-19 PROCEDURE — 99213 OFFICE O/P EST LOW 20 MIN: CPT

## 2025-01-01 ENCOUNTER — RESULT REVIEW (OUTPATIENT)
Age: 85
End: 2025-01-01

## 2025-01-01 ENCOUNTER — INPATIENT (INPATIENT)
Facility: HOSPITAL | Age: 85
LOS: 2 days | DRG: 872 | End: 2025-01-13
Attending: INTERNAL MEDICINE | Admitting: INTERNAL MEDICINE
Payer: MEDICARE

## 2025-01-01 VITALS — HEART RATE: 87 BPM | TEMPERATURE: 101 F

## 2025-01-01 DIAGNOSIS — J96.01 ACUTE RESPIRATORY FAILURE WITH HYPOXIA: ICD-10-CM

## 2025-01-01 DIAGNOSIS — A41.9 SEPSIS, UNSPECIFIED ORGANISM: ICD-10-CM

## 2025-01-01 DIAGNOSIS — N17.9 ACUTE KIDNEY FAILURE, UNSPECIFIED: ICD-10-CM

## 2025-01-01 DIAGNOSIS — E43 UNSPECIFIED SEVERE PROTEIN-CALORIE MALNUTRITION: ICD-10-CM

## 2025-01-01 DIAGNOSIS — R53.81 OTHER MALAISE: ICD-10-CM

## 2025-01-01 DIAGNOSIS — Z86.69 PERSONAL HISTORY OF OTHER DISEASES OF THE NERVOUS SYSTEM AND SENSE ORGANS: Chronic | ICD-10-CM

## 2025-01-01 DIAGNOSIS — Z98.89 OTHER SPECIFIED POSTPROCEDURAL STATES: Chronic | ICD-10-CM

## 2025-01-01 DIAGNOSIS — T84.7XXA INFECTION AND INFLAMMATORY REACTION DUE TO OTHER INTERNAL ORTHOPEDIC PROSTHETIC DEVICES, IMPLANTS AND GRAFTS, INITIAL ENCOUNTER: Chronic | ICD-10-CM

## 2025-01-01 DIAGNOSIS — Z51.5 ENCOUNTER FOR PALLIATIVE CARE: ICD-10-CM

## 2025-01-01 DIAGNOSIS — Z98.890 OTHER SPECIFIED POSTPROCEDURAL STATES: Chronic | ICD-10-CM

## 2025-01-01 LAB
-  AMPICILLIN/SULBACTAM: SIGNIFICANT CHANGE UP
-  AMPICILLIN: SIGNIFICANT CHANGE UP
-  AZTREONAM: SIGNIFICANT CHANGE UP
-  CEFAZOLIN: SIGNIFICANT CHANGE UP
-  CEFEPIME: SIGNIFICANT CHANGE UP
-  CEFOXITIN: SIGNIFICANT CHANGE UP
-  CEFTRIAXONE: SIGNIFICANT CHANGE UP
-  CIPROFLOXACIN: SIGNIFICANT CHANGE UP
-  ERTAPENEM: SIGNIFICANT CHANGE UP
-  GENTAMICIN: SIGNIFICANT CHANGE UP
-  IMIPENEM: SIGNIFICANT CHANGE UP
-  LEVOFLOXACIN: SIGNIFICANT CHANGE UP
-  MEROPENEM: SIGNIFICANT CHANGE UP
-  PIPERACILLIN/TAZOBACTAM: SIGNIFICANT CHANGE UP
-  TOBRAMYCIN: SIGNIFICANT CHANGE UP
-  TRIMETHOPRIM/SULFAMETHOXAZOLE: SIGNIFICANT CHANGE UP
ALBUMIN SERPL ELPH-MCNC: 1.9 G/DL — LOW (ref 3.5–5)
ALBUMIN SERPL ELPH-MCNC: 2 G/DL — LOW (ref 3.5–5)
ALBUMIN SERPL ELPH-MCNC: 2.5 G/DL — LOW (ref 3.5–5)
ALBUMIN SERPL ELPH-MCNC: 2.5 G/DL — LOW (ref 3.5–5)
ALBUMIN SERPL ELPH-MCNC: 2.6 G/DL — LOW (ref 3.5–5)
ALP SERPL-CCNC: 101 U/L — SIGNIFICANT CHANGE UP (ref 40–120)
ALP SERPL-CCNC: 86 U/L — SIGNIFICANT CHANGE UP (ref 40–120)
ALP SERPL-CCNC: 87 U/L — SIGNIFICANT CHANGE UP (ref 40–120)
ALP SERPL-CCNC: 90 U/L — SIGNIFICANT CHANGE UP (ref 40–120)
ALP SERPL-CCNC: 92 U/L — SIGNIFICANT CHANGE UP (ref 40–120)
ALP SERPL-CCNC: 94 U/L — SIGNIFICANT CHANGE UP (ref 40–120)
ALP SERPL-CCNC: 96 U/L — SIGNIFICANT CHANGE UP (ref 40–120)
ALT FLD-CCNC: 107 U/L DA — HIGH (ref 10–60)
ALT FLD-CCNC: 142 U/L DA — HIGH (ref 10–60)
ALT FLD-CCNC: 146 U/L DA — HIGH (ref 10–60)
ALT FLD-CCNC: 152 U/L DA — HIGH (ref 10–60)
ALT FLD-CCNC: 158 U/L DA — HIGH (ref 10–60)
ALT FLD-CCNC: 160 U/L DA — HIGH (ref 10–60)
ALT FLD-CCNC: 164 U/L DA — HIGH (ref 10–60)
ANION GAP SERPL CALC-SCNC: 11 MMOL/L — SIGNIFICANT CHANGE UP (ref 5–17)
ANION GAP SERPL CALC-SCNC: 11 MMOL/L — SIGNIFICANT CHANGE UP (ref 5–17)
ANION GAP SERPL CALC-SCNC: 12 MMOL/L — SIGNIFICANT CHANGE UP (ref 5–17)
ANION GAP SERPL CALC-SCNC: 12 MMOL/L — SIGNIFICANT CHANGE UP (ref 5–17)
ANION GAP SERPL CALC-SCNC: 13 MMOL/L — SIGNIFICANT CHANGE UP (ref 5–17)
ANION GAP SERPL CALC-SCNC: 13 MMOL/L — SIGNIFICANT CHANGE UP (ref 5–17)
ANION GAP SERPL CALC-SCNC: 14 MMOL/L — SIGNIFICANT CHANGE UP (ref 5–17)
ANION GAP SERPL CALC-SCNC: 14 MMOL/L — SIGNIFICANT CHANGE UP (ref 5–17)
ANION GAP SERPL CALC-SCNC: 15 MMOL/L — SIGNIFICANT CHANGE UP (ref 5–17)
ANISOCYTOSIS BLD QL: SLIGHT — SIGNIFICANT CHANGE UP
APPEARANCE UR: ABNORMAL
APTT BLD: 31.6 SEC — SIGNIFICANT CHANGE UP (ref 24.5–35.6)
AST SERPL-CCNC: 151 U/L — HIGH (ref 10–40)
AST SERPL-CCNC: 166 U/L — HIGH (ref 10–40)
AST SERPL-CCNC: 180 U/L — HIGH (ref 10–40)
AST SERPL-CCNC: 182 U/L — HIGH (ref 10–40)
AST SERPL-CCNC: 257 U/L — HIGH (ref 10–40)
AST SERPL-CCNC: 343 U/L — HIGH (ref 10–40)
AST SERPL-CCNC: 359 U/L — HIGH (ref 10–40)
BACTERIA # UR AUTO: ABNORMAL /HPF
BASE EXCESS BLDA CALC-SCNC: -12.6 MMOL/L — LOW (ref -2–3)
BASE EXCESS BLDA CALC-SCNC: -12.6 MMOL/L — LOW (ref -2–3)
BASE EXCESS BLDV CALC-SCNC: -10.4 MMOL/L — SIGNIFICANT CHANGE UP
BASE EXCESS BLDV CALC-SCNC: -13.1 MMOL/L — SIGNIFICANT CHANGE UP
BASE EXCESS BLDV CALC-SCNC: -8.6 MMOL/L — SIGNIFICANT CHANGE UP
BASOPHILS # BLD AUTO: 0 K/UL — SIGNIFICANT CHANGE UP (ref 0–0.2)
BASOPHILS # BLD AUTO: 0.01 K/UL — SIGNIFICANT CHANGE UP (ref 0–0.2)
BASOPHILS NFR BLD AUTO: 0 % — SIGNIFICANT CHANGE UP (ref 0–2)
BILIRUB SERPL-MCNC: 0.3 MG/DL — SIGNIFICANT CHANGE UP (ref 0.2–1.2)
BILIRUB SERPL-MCNC: 0.4 MG/DL — SIGNIFICANT CHANGE UP (ref 0.2–1.2)
BILIRUB SERPL-MCNC: 0.5 MG/DL — SIGNIFICANT CHANGE UP (ref 0.2–1.2)
BILIRUB SERPL-MCNC: 0.6 MG/DL — SIGNIFICANT CHANGE UP (ref 0.2–1.2)
BILIRUB UR-MCNC: NEGATIVE — SIGNIFICANT CHANGE UP
BLD GP AB SCN SERPL QL: SIGNIFICANT CHANGE UP
BLOOD GAS COMMENTS ARTERIAL: SIGNIFICANT CHANGE UP
BLOOD GAS COMMENTS, VENOUS: SIGNIFICANT CHANGE UP
BUN SERPL-MCNC: 28 MG/DL — HIGH (ref 7–18)
BUN SERPL-MCNC: 34 MG/DL — HIGH (ref 7–18)
BUN SERPL-MCNC: 39 MG/DL — HIGH (ref 7–18)
BUN SERPL-MCNC: 49 MG/DL — HIGH (ref 7–18)
BUN SERPL-MCNC: 57 MG/DL — HIGH (ref 7–18)
BUN SERPL-MCNC: 58 MG/DL — HIGH (ref 7–18)
BUN SERPL-MCNC: 61 MG/DL — HIGH (ref 7–18)
BUN SERPL-MCNC: 63 MG/DL — HIGH (ref 7–18)
BUN SERPL-MCNC: 74 MG/DL — HIGH (ref 7–18)
BURR CELLS BLD QL SMEAR: PRESENT — SIGNIFICANT CHANGE UP
CA-I SERPL-SCNC: SIGNIFICANT CHANGE UP MMOL/L (ref 1.15–1.33)
CALCIUM SERPL-MCNC: 6.9 MG/DL — LOW (ref 8.4–10.5)
CALCIUM SERPL-MCNC: 7 MG/DL — LOW (ref 8.4–10.5)
CALCIUM SERPL-MCNC: 7 MG/DL — LOW (ref 8.4–10.5)
CALCIUM SERPL-MCNC: 7.1 MG/DL — LOW (ref 8.4–10.5)
CALCIUM SERPL-MCNC: 7.2 MG/DL — LOW (ref 8.4–10.5)
CALCIUM SERPL-MCNC: 7.3 MG/DL — LOW (ref 8.4–10.5)
CALCIUM SERPL-MCNC: 7.5 MG/DL — LOW (ref 8.4–10.5)
CALCIUM SERPL-MCNC: 7.6 MG/DL — LOW (ref 8.4–10.5)
CALCIUM SERPL-MCNC: 8.2 MG/DL — LOW (ref 8.4–10.5)
CHLORIDE SERPL-SCNC: 108 MMOL/L — SIGNIFICANT CHANGE UP (ref 96–108)
CHLORIDE SERPL-SCNC: 108 MMOL/L — SIGNIFICANT CHANGE UP (ref 96–108)
CHLORIDE SERPL-SCNC: 110 MMOL/L — HIGH (ref 96–108)
CHLORIDE SERPL-SCNC: 110 MMOL/L — HIGH (ref 96–108)
CHLORIDE SERPL-SCNC: 112 MMOL/L — HIGH (ref 96–108)
CHLORIDE SERPL-SCNC: 112 MMOL/L — HIGH (ref 96–108)
CHLORIDE SERPL-SCNC: 113 MMOL/L — HIGH (ref 96–108)
CHLORIDE SERPL-SCNC: 113 MMOL/L — HIGH (ref 96–108)
CHLORIDE SERPL-SCNC: 114 MMOL/L — HIGH (ref 96–108)
CO2 SERPL-SCNC: 12 MMOL/L — LOW (ref 22–31)
CO2 SERPL-SCNC: 14 MMOL/L — LOW (ref 22–31)
CO2 SERPL-SCNC: 15 MMOL/L — LOW (ref 22–31)
CO2 SERPL-SCNC: 16 MMOL/L — LOW (ref 22–31)
CO2 SERPL-SCNC: 16 MMOL/L — LOW (ref 22–31)
COLOR SPEC: YELLOW — SIGNIFICANT CHANGE UP
CREAT SERPL-MCNC: 3.33 MG/DL — HIGH (ref 0.5–1.3)
CREAT SERPL-MCNC: 3.48 MG/DL — HIGH (ref 0.5–1.3)
CREAT SERPL-MCNC: 3.8 MG/DL — HIGH (ref 0.5–1.3)
CREAT SERPL-MCNC: 4.75 MG/DL — HIGH (ref 0.5–1.3)
CREAT SERPL-MCNC: 5.19 MG/DL — HIGH (ref 0.5–1.3)
CREAT SERPL-MCNC: 5.22 MG/DL — HIGH (ref 0.5–1.3)
CREAT SERPL-MCNC: 5.42 MG/DL — HIGH (ref 0.5–1.3)
CREAT SERPL-MCNC: 5.6 MG/DL — HIGH (ref 0.5–1.3)
CREAT SERPL-MCNC: 5.8 MG/DL — HIGH (ref 0.5–1.3)
CULTURE RESULTS: ABNORMAL
CULTURE RESULTS: ABNORMAL
CULTURE RESULTS: SIGNIFICANT CHANGE UP
CULTURE RESULTS: SIGNIFICANT CHANGE UP
DIFF PNL FLD: ABNORMAL
E COLI DNA BLD POS QL NAA+NON-PROBE: SIGNIFICANT CHANGE UP
EGFR: 11 ML/MIN/1.73M2 — LOW
EGFR: 12 ML/MIN/1.73M2 — LOW
EGFR: 13 ML/MIN/1.73M2 — LOW
EGFR: 7 ML/MIN/1.73M2 — LOW
EGFR: 8 ML/MIN/1.73M2 — LOW
EGFR: 8 ML/MIN/1.73M2 — LOW
EGFR: 9 ML/MIN/1.73M2 — LOW
ELLIPTOCYTES BLD QL SMEAR: SLIGHT — SIGNIFICANT CHANGE UP
EOSINOPHIL # BLD AUTO: 0 K/UL — SIGNIFICANT CHANGE UP (ref 0–0.5)
EOSINOPHIL # BLD AUTO: 0.26 K/UL — SIGNIFICANT CHANGE UP (ref 0–0.5)
EOSINOPHIL # BLD AUTO: 4.68 K/UL — HIGH (ref 0–0.5)
EOSINOPHIL NFR BLD AUTO: 0 % — SIGNIFICANT CHANGE UP (ref 0–6)
EOSINOPHIL NFR BLD AUTO: 1 % — SIGNIFICANT CHANGE UP (ref 0–6)
EOSINOPHIL NFR BLD AUTO: 12.5 % — HIGH (ref 0–6)
EPI CELLS # UR: PRESENT
FLUAV AG NPH QL: SIGNIFICANT CHANGE UP
FLUBV AG NPH QL: SIGNIFICANT CHANGE UP
GAS PNL BLDA: SIGNIFICANT CHANGE UP
GAS PNL BLDA: SIGNIFICANT CHANGE UP
GAS PNL BLDV: 135 MMOL/L — LOW (ref 136–145)
GAS PNL BLDV: 136 MMOL/L — SIGNIFICANT CHANGE UP (ref 136–145)
GAS PNL BLDV: 137 MMOL/L — SIGNIFICANT CHANGE UP (ref 136–145)
GAS PNL BLDV: SIGNIFICANT CHANGE UP
GLUCOSE BLDC GLUCOMTR-MCNC: 117 MG/DL — HIGH (ref 70–99)
GLUCOSE BLDC GLUCOMTR-MCNC: 146 MG/DL — HIGH (ref 70–99)
GLUCOSE BLDC GLUCOMTR-MCNC: 151 MG/DL — HIGH (ref 70–99)
GLUCOSE BLDC GLUCOMTR-MCNC: 155 MG/DL — HIGH (ref 70–99)
GLUCOSE BLDC GLUCOMTR-MCNC: 164 MG/DL — HIGH (ref 70–99)
GLUCOSE BLDC GLUCOMTR-MCNC: 170 MG/DL — HIGH (ref 70–99)
GLUCOSE BLDC GLUCOMTR-MCNC: 183 MG/DL — HIGH (ref 70–99)
GLUCOSE SERPL-MCNC: 116 MG/DL — HIGH (ref 70–99)
GLUCOSE SERPL-MCNC: 171 MG/DL — HIGH (ref 70–99)
GLUCOSE SERPL-MCNC: 174 MG/DL — HIGH (ref 70–99)
GLUCOSE SERPL-MCNC: 189 MG/DL — HIGH (ref 70–99)
GLUCOSE SERPL-MCNC: 189 MG/DL — HIGH (ref 70–99)
GLUCOSE SERPL-MCNC: 190 MG/DL — HIGH (ref 70–99)
GLUCOSE SERPL-MCNC: 192 MG/DL — HIGH (ref 70–99)
GLUCOSE SERPL-MCNC: 197 MG/DL — HIGH (ref 70–99)
GLUCOSE SERPL-MCNC: 314 MG/DL — HIGH (ref 70–99)
GLUCOSE UR QL: NEGATIVE MG/DL — SIGNIFICANT CHANGE UP
GRAM STN FLD: ABNORMAL
GRAM STN FLD: SIGNIFICANT CHANGE UP
HCO3 BLDA-SCNC: 13 MMOL/L — LOW (ref 21–28)
HCO3 BLDA-SCNC: 16 MMOL/L — LOW (ref 21–28)
HCO3 BLDV-SCNC: 15 MMOL/L — LOW (ref 22–29)
HCO3 BLDV-SCNC: 15 MMOL/L — LOW (ref 22–29)
HCO3 BLDV-SCNC: 17 MMOL/L — LOW (ref 22–29)
HCT VFR BLD CALC: 30.2 % — LOW (ref 34.5–45)
HCT VFR BLD CALC: 32.1 % — LOW (ref 34.5–45)
HCT VFR BLD CALC: 33.6 % — LOW (ref 34.5–45)
HCT VFR BLD CALC: 36.1 % — SIGNIFICANT CHANGE UP (ref 34.5–45)
HCT VFR BLD CALC: 36.1 % — SIGNIFICANT CHANGE UP (ref 34.5–45)
HGB BLD-MCNC: 10 G/DL — LOW (ref 11.5–15.5)
HGB BLD-MCNC: 10.6 G/DL — LOW (ref 11.5–15.5)
HGB BLD-MCNC: 10.8 G/DL — LOW (ref 11.5–15.5)
HGB BLD-MCNC: 11.2 G/DL — LOW (ref 11.5–15.5)
HGB BLD-MCNC: 9.6 G/DL — LOW (ref 11.5–15.5)
HOROWITZ INDEX BLDA+IHG-RTO: 30 — SIGNIFICANT CHANGE UP
HOROWITZ INDEX BLDA+IHG-RTO: 50 — SIGNIFICANT CHANGE UP
HOROWITZ INDEX BLDV+IHG-RTO: 21 — SIGNIFICANT CHANGE UP
HYPOSEGMENTATION: PRESENT — SIGNIFICANT CHANGE UP
HYPOSEGMENTATION: PRESENT — SIGNIFICANT CHANGE UP
IMM GRANULOCYTES NFR BLD AUTO: 9.5 % — HIGH (ref 0–0.9)
INR BLD: 1.51 RATIO — HIGH (ref 0.85–1.16)
KETONES UR-MCNC: ABNORMAL MG/DL
LACTATE BLDV-MCNC: 4 MMOL/L — CRITICAL HIGH (ref 0.5–2)
LACTATE BLDV-MCNC: 4.3 MMOL/L — CRITICAL HIGH (ref 0.5–2)
LACTATE BLDV-MCNC: 4.9 MMOL/L — CRITICAL HIGH (ref 0.5–2)
LACTATE SERPL-SCNC: 1.6 MMOL/L — SIGNIFICANT CHANGE UP (ref 0.7–2)
LACTATE SERPL-SCNC: 1.7 MMOL/L — SIGNIFICANT CHANGE UP (ref 0.7–2)
LACTATE SERPL-SCNC: 3.2 MMOL/L — HIGH (ref 0.7–2)
LACTATE SERPL-SCNC: 3.7 MMOL/L — HIGH (ref 0.7–2)
LACTATE SERPL-SCNC: 4.1 MMOL/L — CRITICAL HIGH (ref 0.7–2)
LDH SERPL L TO P-CCNC: 556 U/L — HIGH (ref 120–225)
LEGIONELLA AG UR QL: NEGATIVE — SIGNIFICANT CHANGE UP
LEUKOCYTE ESTERASE UR-ACNC: ABNORMAL
LG PLATELETS BLD QL AUTO: SLIGHT — SIGNIFICANT CHANGE UP
LG PLATELETS BLD QL AUTO: SLIGHT — SIGNIFICANT CHANGE UP
LYMPHOCYTES # BLD AUTO: 0.48 K/UL — LOW (ref 1–3.3)
LYMPHOCYTES # BLD AUTO: 0.64 K/UL — LOW (ref 1–3.3)
LYMPHOCYTES # BLD AUTO: 0.77 K/UL — LOW (ref 1–3.3)
LYMPHOCYTES # BLD AUTO: 0.85 K/UL — LOW (ref 1–3.3)
LYMPHOCYTES # BLD AUTO: 1.3 % — LOW (ref 13–44)
LYMPHOCYTES # BLD AUTO: 15 % — SIGNIFICANT CHANGE UP (ref 13–44)
LYMPHOCYTES # BLD AUTO: 2 % — LOW (ref 13–44)
LYMPHOCYTES # BLD AUTO: 3 % — LOW (ref 13–44)
LYMPHOCYTES # BLD AUTO: 3 % — LOW (ref 13–44)
LYMPHOCYTES # BLD AUTO: 5.53 K/UL — HIGH (ref 1–3.3)
M PNEUMO IGM SER-ACNC: 0.32 INDEX — SIGNIFICANT CHANGE UP (ref 0–0.9)
MAGNESIUM SERPL-MCNC: 1.6 MG/DL — SIGNIFICANT CHANGE UP (ref 1.6–2.6)
MAGNESIUM SERPL-MCNC: 1.6 MG/DL — SIGNIFICANT CHANGE UP (ref 1.6–2.6)
MAGNESIUM SERPL-MCNC: 2.2 MG/DL — SIGNIFICANT CHANGE UP (ref 1.6–2.6)
MAGNESIUM SERPL-MCNC: 2.3 MG/DL — SIGNIFICANT CHANGE UP (ref 1.6–2.6)
MAGNESIUM SERPL-MCNC: 2.4 MG/DL — SIGNIFICANT CHANGE UP (ref 1.6–2.6)
MANUAL SMEAR VERIFICATION: SIGNIFICANT CHANGE UP
MCHC RBC-ENTMCNC: 24.2 PG — LOW (ref 27–34)
MCHC RBC-ENTMCNC: 24.3 PG — LOW (ref 27–34)
MCHC RBC-ENTMCNC: 24.5 PG — LOW (ref 27–34)
MCHC RBC-ENTMCNC: 25.1 PG — LOW (ref 27–34)
MCHC RBC-ENTMCNC: 25.1 PG — LOW (ref 27–34)
MCHC RBC-ENTMCNC: 29.9 G/DL — LOW (ref 32–36)
MCHC RBC-ENTMCNC: 31 G/DL — LOW (ref 32–36)
MCHC RBC-ENTMCNC: 31.2 G/DL — LOW (ref 32–36)
MCHC RBC-ENTMCNC: 31.5 G/DL — LOW (ref 32–36)
MCHC RBC-ENTMCNC: 31.8 G/DL — LOW (ref 32–36)
MCV RBC AUTO: 77.7 FL — LOW (ref 80–100)
MCV RBC AUTO: 78.8 FL — LOW (ref 80–100)
MCV RBC AUTO: 79.1 FL — LOW (ref 80–100)
MCV RBC AUTO: 79.6 FL — LOW (ref 80–100)
MCV RBC AUTO: 81.3 FL — SIGNIFICANT CHANGE UP (ref 80–100)
METAMYELOCYTES # FLD: 2 % — HIGH (ref 0–0)
METHOD TYPE: SIGNIFICANT CHANGE UP
METHOD TYPE: SIGNIFICANT CHANGE UP
MICROCYTES BLD QL: SLIGHT — SIGNIFICANT CHANGE UP
MONOCYTES # BLD AUTO: 0.28 K/UL — SIGNIFICANT CHANGE UP (ref 0–0.9)
MONOCYTES # BLD AUTO: 0.64 K/UL — SIGNIFICANT CHANGE UP (ref 0–0.9)
MONOCYTES # BLD AUTO: 1.4 K/UL — HIGH (ref 0–0.9)
MONOCYTES # BLD AUTO: 1.53 K/UL — HIGH (ref 0–0.9)
MONOCYTES # BLD AUTO: 2.58 K/UL — HIGH (ref 0–0.9)
MONOCYTES NFR BLD AUTO: 1 % — LOW (ref 2–14)
MONOCYTES NFR BLD AUTO: 2 % — SIGNIFICANT CHANGE UP (ref 2–14)
MONOCYTES NFR BLD AUTO: 3.7 % — SIGNIFICANT CHANGE UP (ref 2–14)
MONOCYTES NFR BLD AUTO: 6 % — SIGNIFICANT CHANGE UP (ref 2–14)
MONOCYTES NFR BLD AUTO: 7 % — SIGNIFICANT CHANGE UP (ref 2–14)
MRSA PCR RESULT.: SIGNIFICANT CHANGE UP
MYCOPLASMA AG SPEC QL: NEGATIVE — SIGNIFICANT CHANGE UP
MYELOCYTES NFR BLD: 1 % — HIGH (ref 0–0)
MYELOCYTES NFR BLD: 3 % — HIGH (ref 0–0)
NEUTROPHILS # BLD AUTO: 22.2 K/UL — HIGH (ref 1.8–7.4)
NEUTROPHILS # BLD AUTO: 26.85 K/UL — HIGH (ref 1.8–7.4)
NEUTROPHILS # BLD AUTO: 27.43 K/UL — HIGH (ref 1.8–7.4)
NEUTROPHILS # BLD AUTO: 28.74 K/UL — HIGH (ref 1.8–7.4)
NEUTROPHILS # BLD AUTO: 29.28 K/UL — HIGH (ref 1.8–7.4)
NEUTROPHILS NFR BLD AUTO: 71 % — SIGNIFICANT CHANGE UP (ref 43–77)
NEUTROPHILS NFR BLD AUTO: 73 % — SIGNIFICANT CHANGE UP (ref 43–77)
NEUTROPHILS NFR BLD AUTO: 80 % — HIGH (ref 43–77)
NEUTROPHILS NFR BLD AUTO: 86 % — HIGH (ref 43–77)
NEUTROPHILS NFR BLD AUTO: 95 % — HIGH (ref 43–77)
NEUTS BAND # BLD: 5 % — SIGNIFICANT CHANGE UP (ref 0–8)
NEUTS BAND # BLD: 7 % — SIGNIFICANT CHANGE UP (ref 0–8)
NEUTS BAND # BLD: 7 % — SIGNIFICANT CHANGE UP (ref 0–8)
NITRITE UR-MCNC: NEGATIVE — SIGNIFICANT CHANGE UP
NRBC # BLD: 0 /100 WBCS — SIGNIFICANT CHANGE UP (ref 0–0)
ORGANISM # SPEC MICROSCOPIC CNT: ABNORMAL
OVALOCYTES BLD QL SMEAR: SLIGHT — SIGNIFICANT CHANGE UP
OVALOCYTES BLD QL SMEAR: SLIGHT — SIGNIFICANT CHANGE UP
PCO2 BLDA: 29 MMHG — LOW (ref 32–35)
PCO2 BLDA: 46 MMHG — HIGH (ref 32–35)
PCO2 BLDV: 31 MMHG — LOW (ref 39–42)
PCO2 BLDV: 37 MMHG — LOW (ref 39–42)
PCO2 BLDV: 39 MMHG — SIGNIFICANT CHANGE UP (ref 39–42)
PH BLDA: 7.15 — CRITICAL LOW (ref 7.35–7.45)
PH BLDA: 7.26 — LOW (ref 7.35–7.45)
PH BLDV: 7.18 — LOW (ref 7.32–7.43)
PH BLDV: 7.28 — LOW (ref 7.32–7.43)
PH BLDV: 7.29 — LOW (ref 7.32–7.43)
PH UR: 6 — SIGNIFICANT CHANGE UP (ref 5–8)
PHOSPHATE SERPL-MCNC: 3.8 MG/DL — SIGNIFICANT CHANGE UP (ref 2.5–4.5)
PHOSPHATE SERPL-MCNC: 4.1 MG/DL — SIGNIFICANT CHANGE UP (ref 2.5–4.5)
PHOSPHATE SERPL-MCNC: 6.3 MG/DL — HIGH (ref 2.5–4.5)
PHOSPHATE SERPL-MCNC: 6.4 MG/DL — HIGH (ref 2.5–4.5)
PHOSPHATE SERPL-MCNC: 6.5 MG/DL — HIGH (ref 2.5–4.5)
PHOSPHATE SERPL-MCNC: 6.9 MG/DL — HIGH (ref 2.5–4.5)
PHOSPHATE SERPL-MCNC: 7.6 MG/DL — HIGH (ref 2.5–4.5)
PLAT MORPH BLD: NORMAL — SIGNIFICANT CHANGE UP
PLATELET # BLD AUTO: 103 K/UL — LOW (ref 150–400)
PLATELET # BLD AUTO: 189 K/UL — SIGNIFICANT CHANGE UP (ref 150–400)
PLATELET # BLD AUTO: 207 K/UL — SIGNIFICANT CHANGE UP (ref 150–400)
PLATELET # BLD AUTO: 252 K/UL — SIGNIFICANT CHANGE UP (ref 150–400)
PLATELET # BLD AUTO: 253 K/UL — SIGNIFICANT CHANGE UP (ref 150–400)
PLATELET COUNT - ESTIMATE: ABNORMAL
PLATELET COUNT - ESTIMATE: NORMAL — SIGNIFICANT CHANGE UP
PO2 BLDA: 109 MMHG — HIGH (ref 83–108)
PO2 BLDA: 171 MMHG — HIGH (ref 83–108)
PO2 BLDV: 44 MMHG — SIGNIFICANT CHANGE UP
PO2 BLDV: 55 MMHG — SIGNIFICANT CHANGE UP
PO2 BLDV: 58 MMHG — SIGNIFICANT CHANGE UP
POIKILOCYTOSIS BLD QL AUTO: SLIGHT — SIGNIFICANT CHANGE UP
POIKILOCYTOSIS BLD QL AUTO: SLIGHT — SIGNIFICANT CHANGE UP
POLYCHROMASIA BLD QL SMEAR: SLIGHT — SIGNIFICANT CHANGE UP
POTASSIUM BLDV-SCNC: 3.8 MMOL/L — SIGNIFICANT CHANGE UP (ref 3.5–5.1)
POTASSIUM BLDV-SCNC: 3.9 MMOL/L — SIGNIFICANT CHANGE UP (ref 3.5–5.1)
POTASSIUM BLDV-SCNC: 4 MMOL/L — SIGNIFICANT CHANGE UP (ref 3.5–5.1)
POTASSIUM SERPL-MCNC: 3.7 MMOL/L — SIGNIFICANT CHANGE UP (ref 3.5–5.3)
POTASSIUM SERPL-MCNC: 3.9 MMOL/L — SIGNIFICANT CHANGE UP (ref 3.5–5.3)
POTASSIUM SERPL-MCNC: 4.2 MMOL/L — SIGNIFICANT CHANGE UP (ref 3.5–5.3)
POTASSIUM SERPL-MCNC: 5.1 MMOL/L — SIGNIFICANT CHANGE UP (ref 3.5–5.3)
POTASSIUM SERPL-MCNC: 5.1 MMOL/L — SIGNIFICANT CHANGE UP (ref 3.5–5.3)
POTASSIUM SERPL-MCNC: 5.6 MMOL/L — HIGH (ref 3.5–5.3)
POTASSIUM SERPL-MCNC: 5.7 MMOL/L — HIGH (ref 3.5–5.3)
POTASSIUM SERPL-MCNC: 5.7 MMOL/L — HIGH (ref 3.5–5.3)
POTASSIUM SERPL-MCNC: 5.8 MMOL/L — HIGH (ref 3.5–5.3)
POTASSIUM SERPL-SCNC: 3.7 MMOL/L — SIGNIFICANT CHANGE UP (ref 3.5–5.3)
POTASSIUM SERPL-SCNC: 3.9 MMOL/L — SIGNIFICANT CHANGE UP (ref 3.5–5.3)
POTASSIUM SERPL-SCNC: 4.2 MMOL/L — SIGNIFICANT CHANGE UP (ref 3.5–5.3)
POTASSIUM SERPL-SCNC: 5.1 MMOL/L — SIGNIFICANT CHANGE UP (ref 3.5–5.3)
POTASSIUM SERPL-SCNC: 5.1 MMOL/L — SIGNIFICANT CHANGE UP (ref 3.5–5.3)
POTASSIUM SERPL-SCNC: 5.6 MMOL/L — HIGH (ref 3.5–5.3)
POTASSIUM SERPL-SCNC: 5.7 MMOL/L — HIGH (ref 3.5–5.3)
POTASSIUM SERPL-SCNC: 5.7 MMOL/L — HIGH (ref 3.5–5.3)
POTASSIUM SERPL-SCNC: 5.8 MMOL/L — HIGH (ref 3.5–5.3)
PROT SERPL-MCNC: 5.2 G/DL — LOW (ref 6–8.3)
PROT SERPL-MCNC: 5.5 G/DL — LOW (ref 6–8.3)
PROT SERPL-MCNC: 5.5 G/DL — LOW (ref 6–8.3)
PROT SERPL-MCNC: 5.6 G/DL — LOW (ref 6–8.3)
PROT SERPL-MCNC: 5.9 G/DL — LOW (ref 6–8.3)
PROT SERPL-MCNC: 5.9 G/DL — LOW (ref 6–8.3)
PROT SERPL-MCNC: 6.2 G/DL — SIGNIFICANT CHANGE UP (ref 6–8.3)
PROT UR-MCNC: 300 MG/DL
PROTHROM AB SERPL-ACNC: 17.6 SEC — HIGH (ref 9.9–13.4)
RBC # BLD: 3.82 M/UL — SIGNIFICANT CHANGE UP (ref 3.8–5.2)
RBC # BLD: 4.13 M/UL — SIGNIFICANT CHANGE UP (ref 3.8–5.2)
RBC # BLD: 4.22 M/UL — SIGNIFICANT CHANGE UP (ref 3.8–5.2)
RBC # BLD: 4.44 M/UL — SIGNIFICANT CHANGE UP (ref 3.8–5.2)
RBC # BLD: 4.58 M/UL — SIGNIFICANT CHANGE UP (ref 3.8–5.2)
RBC # FLD: 16.7 % — HIGH (ref 10.3–14.5)
RBC # FLD: 17.1 % — HIGH (ref 10.3–14.5)
RBC # FLD: 17.3 % — HIGH (ref 10.3–14.5)
RBC # FLD: 17.3 % — HIGH (ref 10.3–14.5)
RBC # FLD: 17.5 % — HIGH (ref 10.3–14.5)
RBC BLD AUTO: ABNORMAL
RBC BLD AUTO: NORMAL — SIGNIFICANT CHANGE UP
RBC CASTS # UR COMP ASSIST: 20 /HPF — HIGH (ref 0–4)
RSV RNA NPH QL NAA+NON-PROBE: SIGNIFICANT CHANGE UP
S AUREUS DNA NOSE QL NAA+PROBE: SIGNIFICANT CHANGE UP
S PNEUM AG UR QL: NEGATIVE — SIGNIFICANT CHANGE UP
SAO2 % BLDA: 100 % — SIGNIFICANT CHANGE UP
SAO2 % BLDA: 99 % — SIGNIFICANT CHANGE UP
SAO2 % BLDV: 62.3 % — SIGNIFICANT CHANGE UP
SAO2 % BLDV: 80.4 % — SIGNIFICANT CHANGE UP
SAO2 % BLDV: 85.9 % — SIGNIFICANT CHANGE UP
SARS-COV-2 RNA SPEC QL NAA+PROBE: SIGNIFICANT CHANGE UP
SMUDGE CELLS # BLD: PRESENT — SIGNIFICANT CHANGE UP
SODIUM SERPL-SCNC: 135 MMOL/L — SIGNIFICANT CHANGE UP (ref 135–145)
SODIUM SERPL-SCNC: 136 MMOL/L — SIGNIFICANT CHANGE UP (ref 135–145)
SODIUM SERPL-SCNC: 137 MMOL/L — SIGNIFICANT CHANGE UP (ref 135–145)
SODIUM SERPL-SCNC: 137 MMOL/L — SIGNIFICANT CHANGE UP (ref 135–145)
SODIUM SERPL-SCNC: 138 MMOL/L — SIGNIFICANT CHANGE UP (ref 135–145)
SODIUM SERPL-SCNC: 140 MMOL/L — SIGNIFICANT CHANGE UP (ref 135–145)
SODIUM SERPL-SCNC: 140 MMOL/L — SIGNIFICANT CHANGE UP (ref 135–145)
SODIUM SERPL-SCNC: 141 MMOL/L — SIGNIFICANT CHANGE UP (ref 135–145)
SODIUM SERPL-SCNC: 142 MMOL/L — SIGNIFICANT CHANGE UP (ref 135–145)
SP GR SPEC: 1.01 — SIGNIFICANT CHANGE UP (ref 1–1.03)
SPECIMEN SOURCE: SIGNIFICANT CHANGE UP
TROPONIN I, HIGH SENSITIVITY RESULT: HIGH NG/L
TSH SERPL-MCNC: 0.37 UU/ML — SIGNIFICANT CHANGE UP (ref 0.34–4.82)
UROBILINOGEN FLD QL: 0.2 MG/DL — SIGNIFICANT CHANGE UP (ref 0.2–1)
VANCOMYCIN TROUGH SERPL-MCNC: 7.8 UG/ML — LOW (ref 10–20)
VARIANT LYMPHS # BLD: 1 % — SIGNIFICANT CHANGE UP (ref 0–6)
VARIANT LYMPHS # BLD: 2 % — SIGNIFICANT CHANGE UP (ref 0–6)
WBC # BLD: 25.52 K/UL — HIGH (ref 3.8–10.5)
WBC # BLD: 28.26 K/UL — HIGH (ref 3.8–10.5)
WBC # BLD: 32.18 K/UL — HIGH (ref 3.8–10.5)
WBC # BLD: 36.84 K/UL — HIGH (ref 3.8–10.5)
WBC # BLD: 37.56 K/UL — HIGH (ref 3.8–10.5)
WBC # FLD AUTO: 25.52 K/UL — HIGH (ref 3.8–10.5)
WBC # FLD AUTO: 28.26 K/UL — HIGH (ref 3.8–10.5)
WBC # FLD AUTO: 32.18 K/UL — HIGH (ref 3.8–10.5)
WBC # FLD AUTO: 36.84 K/UL — HIGH (ref 3.8–10.5)
WBC # FLD AUTO: 37.56 K/UL — HIGH (ref 3.8–10.5)
WBC UR QL: 40 /HPF — HIGH (ref 0–5)

## 2025-01-01 PROCEDURE — 82962 GLUCOSE BLOOD TEST: CPT

## 2025-01-01 PROCEDURE — 80048 BASIC METABOLIC PNL TOTAL CA: CPT

## 2025-01-01 PROCEDURE — 96366 THER/PROPH/DIAG IV INF ADDON: CPT

## 2025-01-01 PROCEDURE — 99497 ADVNCD CARE PLAN 30 MIN: CPT | Mod: 25

## 2025-01-01 PROCEDURE — 81001 URINALYSIS AUTO W/SCOPE: CPT

## 2025-01-01 PROCEDURE — 87070 CULTURE OTHR SPECIMN AEROBIC: CPT

## 2025-01-01 PROCEDURE — 70450 CT HEAD/BRAIN W/O DYE: CPT | Mod: MC

## 2025-01-01 PROCEDURE — 96375 TX/PRO/DX INJ NEW DRUG ADDON: CPT

## 2025-01-01 PROCEDURE — 87637 SARSCOV2&INF A&B&RSV AMP PRB: CPT

## 2025-01-01 PROCEDURE — 87640 STAPH A DNA AMP PROBE: CPT

## 2025-01-01 PROCEDURE — 84100 ASSAY OF PHOSPHORUS: CPT

## 2025-01-01 PROCEDURE — 87077 CULTURE AEROBIC IDENTIFY: CPT

## 2025-01-01 PROCEDURE — 83605 ASSAY OF LACTIC ACID: CPT

## 2025-01-01 PROCEDURE — 83735 ASSAY OF MAGNESIUM: CPT

## 2025-01-01 PROCEDURE — 84295 ASSAY OF SERUM SODIUM: CPT

## 2025-01-01 PROCEDURE — 96365 THER/PROPH/DIAG IV INF INIT: CPT

## 2025-01-01 PROCEDURE — 80053 COMPREHEN METABOLIC PANEL: CPT

## 2025-01-01 PROCEDURE — 87086 URINE CULTURE/COLONY COUNT: CPT

## 2025-01-01 PROCEDURE — 74176 CT ABD & PELVIS W/O CONTRAST: CPT | Mod: MC

## 2025-01-01 PROCEDURE — 36415 COLL VENOUS BLD VENIPUNCTURE: CPT

## 2025-01-01 PROCEDURE — 99223 1ST HOSP IP/OBS HIGH 75: CPT

## 2025-01-01 PROCEDURE — 87641 MR-STAPH DNA AMP PROBE: CPT

## 2025-01-01 PROCEDURE — 94003 VENT MGMT INPAT SUBQ DAY: CPT

## 2025-01-01 PROCEDURE — 85025 COMPLETE CBC W/AUTO DIFF WBC: CPT

## 2025-01-01 PROCEDURE — 86901 BLOOD TYPING SEROLOGIC RH(D): CPT

## 2025-01-01 PROCEDURE — 93010 ELECTROCARDIOGRAM REPORT: CPT

## 2025-01-01 PROCEDURE — 71045 X-RAY EXAM CHEST 1 VIEW: CPT | Mod: 26,77

## 2025-01-01 PROCEDURE — 82330 ASSAY OF CALCIUM: CPT

## 2025-01-01 PROCEDURE — 76775 US EXAM ABDO BACK WALL LIM: CPT | Mod: 26

## 2025-01-01 PROCEDURE — 71250 CT THORAX DX C-: CPT | Mod: 26

## 2025-01-01 PROCEDURE — 71045 X-RAY EXAM CHEST 1 VIEW: CPT | Mod: 26,76

## 2025-01-01 PROCEDURE — 74176 CT ABD & PELVIS W/O CONTRAST: CPT | Mod: 26

## 2025-01-01 PROCEDURE — 82803 BLOOD GASES ANY COMBINATION: CPT

## 2025-01-01 PROCEDURE — 36620 INSERTION CATHETER ARTERY: CPT

## 2025-01-01 PROCEDURE — 87449 NOS EACH ORGANISM AG IA: CPT

## 2025-01-01 PROCEDURE — 84132 ASSAY OF SERUM POTASSIUM: CPT

## 2025-01-01 PROCEDURE — 70450 CT HEAD/BRAIN W/O DYE: CPT | Mod: 26

## 2025-01-01 PROCEDURE — 93005 ELECTROCARDIOGRAM TRACING: CPT

## 2025-01-01 PROCEDURE — 71250 CT THORAX DX C-: CPT | Mod: MC

## 2025-01-01 PROCEDURE — 76775 US EXAM ABDO BACK WALL LIM: CPT

## 2025-01-01 PROCEDURE — 87305 ASPERGILLUS AG IA: CPT

## 2025-01-01 PROCEDURE — 87205 SMEAR GRAM STAIN: CPT

## 2025-01-01 PROCEDURE — 71045 X-RAY EXAM CHEST 1 VIEW: CPT | Mod: 26

## 2025-01-01 PROCEDURE — 71045 X-RAY EXAM CHEST 1 VIEW: CPT

## 2025-01-01 PROCEDURE — 99291 CRITICAL CARE FIRST HOUR: CPT

## 2025-01-01 PROCEDURE — G0136: CPT

## 2025-01-01 PROCEDURE — 99291 CRITICAL CARE FIRST HOUR: CPT | Mod: 25

## 2025-01-01 PROCEDURE — 93306 TTE W/DOPPLER COMPLETE: CPT

## 2025-01-01 PROCEDURE — 93306 TTE W/DOPPLER COMPLETE: CPT | Mod: 26

## 2025-01-01 PROCEDURE — 86850 RBC ANTIBODY SCREEN: CPT

## 2025-01-01 PROCEDURE — 86738 MYCOPLASMA ANTIBODY: CPT

## 2025-01-01 PROCEDURE — 85610 PROTHROMBIN TIME: CPT

## 2025-01-01 PROCEDURE — 84484 ASSAY OF TROPONIN QUANT: CPT

## 2025-01-01 PROCEDURE — 85730 THROMBOPLASTIN TIME PARTIAL: CPT

## 2025-01-01 PROCEDURE — 87040 BLOOD CULTURE FOR BACTERIA: CPT

## 2025-01-01 PROCEDURE — 87899 AGENT NOS ASSAY W/OPTIC: CPT

## 2025-01-01 PROCEDURE — 87150 DNA/RNA AMPLIFIED PROBE: CPT

## 2025-01-01 PROCEDURE — 86900 BLOOD TYPING SEROLOGIC ABO: CPT

## 2025-01-01 PROCEDURE — 84443 ASSAY THYROID STIM HORMONE: CPT

## 2025-01-01 RX ORDER — HYDROCORTISONE 100 MG/60ML
50 ENEMA RECTAL EVERY 6 HOURS
Refills: 0 | Status: DISCONTINUED | OUTPATIENT
Start: 2025-01-01 | End: 2025-01-01

## 2025-01-01 RX ORDER — NOREPINEPHRINE BITARTRATE 1 MG/ML
0.05 INJECTION INTRAVENOUS
Qty: 8 | Refills: 0 | Status: DISCONTINUED | OUTPATIENT
Start: 2025-01-01 | End: 2025-01-01

## 2025-01-01 RX ORDER — PROPOFOL 10 MG/ML
20 INJECTION, EMULSION INTRAVENOUS
Qty: 1000 | Refills: 0 | Status: DISCONTINUED | OUTPATIENT
Start: 2025-01-01 | End: 2025-01-01

## 2025-01-01 RX ORDER — SODIUM CHLORIDE 9 MG/ML
10 INJECTION, SOLUTION INTRAMUSCULAR; INTRAVENOUS; SUBCUTANEOUS
Refills: 0 | Status: DISCONTINUED | OUTPATIENT
Start: 2025-01-01 | End: 2025-01-01

## 2025-01-01 RX ORDER — DEXTROSE MONOHYDRATE 25 G/50ML
50 INJECTION, SOLUTION INTRAVENOUS ONCE
Refills: 0 | Status: COMPLETED | OUTPATIENT
Start: 2025-01-01 | End: 2025-01-01

## 2025-01-01 RX ORDER — FENTANYL 75 UG/H
50 PATCH, EXTENDED RELEASE TRANSDERMAL ONCE
Refills: 0 | Status: DISCONTINUED | OUTPATIENT
Start: 2025-01-01 | End: 2025-01-01

## 2025-01-01 RX ORDER — SODIUM CHLORIDE 9 MG/ML
1000 INJECTION, SOLUTION INTRAMUSCULAR; INTRAVENOUS; SUBCUTANEOUS ONCE
Refills: 0 | Status: COMPLETED | OUTPATIENT
Start: 2025-01-01 | End: 2025-01-01

## 2025-01-01 RX ORDER — PIPERACILLIN AND TAZOBACTAM 3; .375 G/15ML; G/15ML
3.38 INJECTION, POWDER, LYOPHILIZED, FOR SOLUTION INTRAVENOUS ONCE
Refills: 0 | Status: COMPLETED | OUTPATIENT
Start: 2025-01-01 | End: 2025-01-01

## 2025-01-01 RX ORDER — LORAZEPAM 1 MG/1
1 TABLET ORAL EVERY 4 HOURS
Refills: 0 | Status: DISCONTINUED | OUTPATIENT
Start: 2025-01-01 | End: 2025-01-01

## 2025-01-01 RX ORDER — MIDAZOLAM HYDROCHLORIDE 1 MG/ML
0.02 INJECTION INTRAMUSCULAR; INTRAVENOUS
Qty: 100 | Refills: 0 | Status: DISCONTINUED | OUTPATIENT
Start: 2025-01-01 | End: 2025-01-01

## 2025-01-01 RX ORDER — ACETAMINOPHEN 80 MG/.8ML
1000 SOLUTION/ DROPS ORAL ONCE
Refills: 0 | Status: COMPLETED | OUTPATIENT
Start: 2025-01-01 | End: 2025-01-01

## 2025-01-01 RX ORDER — GLYCOPYRROLATE 0.2 MG/ML
0.4 INJECTION, SOLUTION INTRAMUSCULAR; INTRAVENOUS EVERY 4 HOURS
Refills: 0 | Status: DISCONTINUED | OUTPATIENT
Start: 2025-01-01 | End: 2025-01-01

## 2025-01-01 RX ORDER — CHLORHEXIDINE GLUCONATE 1.2 MG/ML
15 RINSE ORAL EVERY 12 HOURS
Refills: 0 | Status: DISCONTINUED | OUTPATIENT
Start: 2025-01-01 | End: 2025-01-01

## 2025-01-01 RX ORDER — ETOMIDATE 40 MG/20ML
20 INJECTION, SOLUTION INTRAVENOUS ONCE
Refills: 0 | Status: COMPLETED | OUTPATIENT
Start: 2025-01-01 | End: 2025-01-01

## 2025-01-01 RX ORDER — PANTOPRAZOLE 40 MG/1
40 TABLET, DELAYED RELEASE ORAL DAILY
Refills: 0 | Status: DISCONTINUED | OUTPATIENT
Start: 2025-01-01 | End: 2025-01-01

## 2025-01-01 RX ORDER — CEFTRIAXONE SODIUM 1 G/1
2000 INJECTION, POWDER, FOR SOLUTION INTRAMUSCULAR; INTRAVENOUS EVERY 24 HOURS
Refills: 0 | Status: DISCONTINUED | OUTPATIENT
Start: 2025-01-01 | End: 2025-01-01

## 2025-01-01 RX ORDER — LORAZEPAM 1 MG/1
2 TABLET ORAL ONCE
Refills: 0 | Status: DISCONTINUED | OUTPATIENT
Start: 2025-01-01 | End: 2025-01-01

## 2025-01-01 RX ORDER — CALCIUM GLUCONATE 94 MG/ML
1 INJECTION, SOLUTION INTRAVENOUS ONCE
Refills: 0 | Status: COMPLETED | OUTPATIENT
Start: 2025-01-01 | End: 2025-01-01

## 2025-01-01 RX ORDER — CHLORHEXIDINE GLUCONATE 1.2 MG/ML
1 RINSE ORAL
Refills: 0 | Status: DISCONTINUED | OUTPATIENT
Start: 2025-01-01 | End: 2025-01-01

## 2025-01-01 RX ORDER — SODIUM ZIRCONIUM CYCLOSILICATE 10 G/10G
10 POWDER, FOR SUSPENSION ORAL ONCE
Refills: 0 | Status: COMPLETED | OUTPATIENT
Start: 2025-01-01 | End: 2025-01-01

## 2025-01-01 RX ORDER — LORAZEPAM 1 MG/1
2 TABLET ORAL
Refills: 0 | Status: DISCONTINUED | OUTPATIENT
Start: 2025-01-01 | End: 2025-01-01

## 2025-01-01 RX ORDER — VANCOMYCIN HYDROCHLORIDE 5 G/100ML
750 INJECTION, POWDER, LYOPHILIZED, FOR SOLUTION INTRAVENOUS
Refills: 0 | Status: DISCONTINUED | OUTPATIENT
Start: 2025-01-01 | End: 2025-01-01

## 2025-01-01 RX ORDER — VANCOMYCIN HYDROCHLORIDE 5 G/100ML
1000 INJECTION, POWDER, LYOPHILIZED, FOR SOLUTION INTRAVENOUS
Refills: 0 | Status: DISCONTINUED | OUTPATIENT
Start: 2025-01-01 | End: 2025-01-01

## 2025-01-01 RX ORDER — INSULIN HUMAN 100 [IU]/ML
5 INJECTION, SOLUTION SUBCUTANEOUS ONCE
Refills: 0 | Status: COMPLETED | OUTPATIENT
Start: 2025-01-01 | End: 2025-01-01

## 2025-01-01 RX ORDER — FENTANYL 75 UG/H
0.5 PATCH, EXTENDED RELEASE TRANSDERMAL
Qty: 2500 | Refills: 0 | Status: DISCONTINUED | OUTPATIENT
Start: 2025-01-01 | End: 2025-01-01

## 2025-01-01 RX ORDER — NOREPINEPHRINE BITARTRATE 1 MG/ML
1.2 INJECTION INTRAVENOUS
Qty: 16 | Refills: 0 | Status: DISCONTINUED | OUTPATIENT
Start: 2025-01-01 | End: 2025-01-01

## 2025-01-01 RX ORDER — SODIUM CHLORIDE 9 MG/ML
1000 INJECTION, SOLUTION INTRAVENOUS ONCE
Refills: 0 | Status: COMPLETED | OUTPATIENT
Start: 2025-01-01 | End: 2025-01-01

## 2025-01-01 RX ORDER — INSULIN HUMAN 100 [IU]/ML
2 INJECTION, SOLUTION SUBCUTANEOUS ONCE
Refills: 0 | Status: COMPLETED | OUTPATIENT
Start: 2025-01-01 | End: 2025-01-01

## 2025-01-01 RX ORDER — PIPERACILLIN AND TAZOBACTAM 3; .375 G/15ML; G/15ML
3.38 INJECTION, POWDER, LYOPHILIZED, FOR SOLUTION INTRAVENOUS EVERY 12 HOURS
Refills: 0 | Status: DISCONTINUED | OUTPATIENT
Start: 2025-01-01 | End: 2025-01-01

## 2025-01-01 RX ORDER — HYDROMORPHONE HCL 4 MG
0.5 TABLET ORAL ONCE
Refills: 0 | Status: DISCONTINUED | OUTPATIENT
Start: 2025-01-01 | End: 2025-01-01

## 2025-01-01 RX ORDER — HYDROMORPHONE HCL 4 MG
0.5 TABLET ORAL
Refills: 0 | Status: DISCONTINUED | OUTPATIENT
Start: 2025-01-01 | End: 2025-01-01

## 2025-01-01 RX ORDER — HYDROMORPHONE HCL 4 MG
1 TABLET ORAL ONCE
Refills: 0 | Status: DISCONTINUED | OUTPATIENT
Start: 2025-01-01 | End: 2025-01-01

## 2025-01-01 RX ORDER — SODIUM ZIRCONIUM CYCLOSILICATE 10 G/10G
5 POWDER, FOR SUSPENSION ORAL ONCE
Refills: 0 | Status: COMPLETED | OUTPATIENT
Start: 2025-01-01 | End: 2025-01-01

## 2025-01-01 RX ORDER — SODIUM BICARBONATE 84 MG/ML
650 INJECTION, SOLUTION INTRAVENOUS EVERY 6 HOURS
Refills: 0 | Status: DISCONTINUED | OUTPATIENT
Start: 2025-01-01 | End: 2025-01-01

## 2025-01-01 RX ORDER — HEPARIN SODIUM 1000 [USP'U]/ML
5000 INJECTION, SOLUTION INTRAVENOUS; SUBCUTANEOUS EVERY 8 HOURS
Refills: 0 | Status: DISCONTINUED | OUTPATIENT
Start: 2025-01-01 | End: 2025-01-01

## 2025-01-01 RX ORDER — SODIUM BICARBONATE 84 MG/ML
50 INJECTION, SOLUTION INTRAVENOUS ONCE
Refills: 0 | Status: COMPLETED | OUTPATIENT
Start: 2025-01-01 | End: 2025-01-01

## 2025-01-01 RX ORDER — MAGNESIUM SULFATE 500 MG/ML
2 INJECTION, SOLUTION INTRAMUSCULAR; INTRAVENOUS ONCE
Refills: 0 | Status: COMPLETED | OUTPATIENT
Start: 2025-01-01 | End: 2025-01-01

## 2025-01-01 RX ORDER — VANCOMYCIN HYDROCHLORIDE 5 G/100ML
1000 INJECTION, POWDER, LYOPHILIZED, FOR SOLUTION INTRAVENOUS ONCE
Refills: 0 | Status: COMPLETED | OUTPATIENT
Start: 2025-01-01 | End: 2025-01-01

## 2025-01-01 RX ORDER — FENTANYL 75 UG/H
0.5 PATCH, EXTENDED RELEASE TRANSDERMAL
Qty: 5000 | Refills: 0 | Status: DISCONTINUED | OUTPATIENT
Start: 2025-01-01 | End: 2025-01-01

## 2025-01-01 RX ORDER — DEXTROSE MONOHYDRATE 25 G/50ML
100 INJECTION, SOLUTION INTRAVENOUS ONCE
Refills: 0 | Status: COMPLETED | OUTPATIENT
Start: 2025-01-01 | End: 2025-01-01

## 2025-01-01 RX ORDER — LEVOTHYROXINE SODIUM 175 UG/1
60 TABLET ORAL AT BEDTIME
Refills: 0 | Status: DISCONTINUED | OUTPATIENT
Start: 2025-01-01 | End: 2025-01-01

## 2025-01-01 RX ORDER — ASPIRIN 81 MG
81 TABLET, DELAYED RELEASE (ENTERIC COATED) ORAL DAILY
Refills: 0 | Status: DISCONTINUED | OUTPATIENT
Start: 2025-01-01 | End: 2025-01-01

## 2025-01-01 RX ADMIN — PANTOPRAZOLE 40 MILLIGRAM(S): 40 TABLET, DELAYED RELEASE ORAL at 11:57

## 2025-01-01 RX ADMIN — MAGNESIUM SULFATE 25 GRAM(S): 500 INJECTION, SOLUTION INTRAMUSCULAR; INTRAVENOUS at 04:00

## 2025-01-01 RX ADMIN — Medication 0.5 MILLIGRAM(S): at 18:25

## 2025-01-01 RX ADMIN — SODIUM ZIRCONIUM CYCLOSILICATE 10 GRAM(S): 10 POWDER, FOR SUSPENSION ORAL at 10:45

## 2025-01-01 RX ADMIN — PIPERACILLIN AND TAZOBACTAM 200 GRAM(S): 3; .375 INJECTION, POWDER, LYOPHILIZED, FOR SOLUTION INTRAVENOUS at 13:09

## 2025-01-01 RX ADMIN — PROPOFOL 8.04 MICROGRAM(S)/KG/MIN: 10 INJECTION, EMULSION INTRAVENOUS at 08:40

## 2025-01-01 RX ADMIN — SODIUM CHLORIDE 1000 MILLILITER(S): 9 INJECTION, SOLUTION INTRAMUSCULAR; INTRAVENOUS; SUBCUTANEOUS at 13:08

## 2025-01-01 RX ADMIN — LEVOTHYROXINE SODIUM 60 MICROGRAM(S): 175 TABLET ORAL at 22:17

## 2025-01-01 RX ADMIN — HEPARIN SODIUM 5000 UNIT(S): 1000 INJECTION, SOLUTION INTRAVENOUS; SUBCUTANEOUS at 15:29

## 2025-01-01 RX ADMIN — NOREPINEPHRINE BITARTRATE 75.8 MICROGRAM(S)/KG/MIN: 1 INJECTION INTRAVENOUS at 05:25

## 2025-01-01 RX ADMIN — LORAZEPAM 2 MILLIGRAM(S): 1 TABLET ORAL at 20:38

## 2025-01-01 RX ADMIN — INSULIN HUMAN 5 UNIT(S): 100 INJECTION, SOLUTION SUBCUTANEOUS at 06:48

## 2025-01-01 RX ADMIN — SODIUM CHLORIDE 1000 MILLILITER(S): 9 INJECTION, SOLUTION INTRAVENOUS at 17:06

## 2025-01-01 RX ADMIN — INSULIN HUMAN 5 UNIT(S): 100 INJECTION, SOLUTION SUBCUTANEOUS at 18:47

## 2025-01-01 RX ADMIN — HYDROCORTISONE 50 MILLIGRAM(S): 100 ENEMA RECTAL at 11:04

## 2025-01-01 RX ADMIN — SODIUM CHLORIDE 1000 MILLILITER(S): 9 INJECTION, SOLUTION INTRAVENOUS at 16:36

## 2025-01-01 RX ADMIN — CHLORHEXIDINE GLUCONATE 15 MILLILITER(S): 1.2 RINSE ORAL at 05:34

## 2025-01-01 RX ADMIN — Medication 0.5 MILLIGRAM(S): at 17:48

## 2025-01-01 RX ADMIN — CHLORHEXIDINE GLUCONATE 1 APPLICATION(S): 1.2 RINSE ORAL at 05:34

## 2025-01-01 RX ADMIN — LORAZEPAM 2 MILLIGRAM(S): 1 TABLET ORAL at 18:31

## 2025-01-01 RX ADMIN — CEFTRIAXONE SODIUM 100 MILLIGRAM(S): 1 INJECTION, POWDER, FOR SOLUTION INTRAMUSCULAR; INTRAVENOUS at 15:29

## 2025-01-01 RX ADMIN — SODIUM CHLORIDE 1000 MILLILITER(S): 9 INJECTION, SOLUTION INTRAVENOUS at 19:25

## 2025-01-01 RX ADMIN — DEXTROSE MONOHYDRATE 50 MILLILITER(S): 25 INJECTION, SOLUTION INTRAVENOUS at 18:46

## 2025-01-01 RX ADMIN — PIPERACILLIN AND TAZOBACTAM 25 GRAM(S): 3; .375 INJECTION, POWDER, LYOPHILIZED, FOR SOLUTION INTRAVENOUS at 17:48

## 2025-01-01 RX ADMIN — Medication 0.5 MILLIGRAM(S): at 17:29

## 2025-01-01 RX ADMIN — FENTANYL 50 MICROGRAM(S): 75 PATCH, EXTENDED RELEASE TRANSDERMAL at 18:00

## 2025-01-01 RX ADMIN — HYDROCORTISONE 50 MILLIGRAM(S): 100 ENEMA RECTAL at 23:41

## 2025-01-01 RX ADMIN — FENTANYL 50 MICROGRAM(S): 75 PATCH, EXTENDED RELEASE TRANSDERMAL at 17:06

## 2025-01-01 RX ADMIN — FENTANYL 3.35 MICROGRAM(S)/KG/HR: 75 PATCH, EXTENDED RELEASE TRANSDERMAL at 08:04

## 2025-01-01 RX ADMIN — Medication 0.5 MILLIGRAM(S): at 18:31

## 2025-01-01 RX ADMIN — PIPERACILLIN AND TAZOBACTAM 25 GRAM(S): 3; .375 INJECTION, POWDER, LYOPHILIZED, FOR SOLUTION INTRAVENOUS at 05:17

## 2025-01-01 RX ADMIN — Medication 1 MILLIGRAM(S): at 16:55

## 2025-01-01 RX ADMIN — HYDROCORTISONE 50 MILLIGRAM(S): 100 ENEMA RECTAL at 19:23

## 2025-01-01 RX ADMIN — Medication 0.5 MILLIGRAM(S): at 18:17

## 2025-01-01 RX ADMIN — LORAZEPAM 1 MILLIGRAM(S): 1 TABLET ORAL at 17:02

## 2025-01-01 RX ADMIN — Medication 0.5 MILLIGRAM(S): at 18:52

## 2025-01-01 RX ADMIN — CHLORHEXIDINE GLUCONATE 15 MILLILITER(S): 1.2 RINSE ORAL at 17:48

## 2025-01-01 RX ADMIN — ACETAMINOPHEN 1000 MILLIGRAM(S): 80 SOLUTION/ DROPS ORAL at 13:42

## 2025-01-01 RX ADMIN — HEPARIN SODIUM 5000 UNIT(S): 1000 INJECTION, SOLUTION INTRAVENOUS; SUBCUTANEOUS at 13:38

## 2025-01-01 RX ADMIN — PANTOPRAZOLE 40 MILLIGRAM(S): 40 TABLET, DELAYED RELEASE ORAL at 11:03

## 2025-01-01 RX ADMIN — PANTOPRAZOLE 40 MILLIGRAM(S): 40 TABLET, DELAYED RELEASE ORAL at 12:54

## 2025-01-01 RX ADMIN — HYDROCORTISONE 50 MILLIGRAM(S): 100 ENEMA RECTAL at 11:56

## 2025-01-01 RX ADMIN — SODIUM BICARBONATE 650 MILLIGRAM(S): 84 INJECTION, SOLUTION INTRAVENOUS at 17:48

## 2025-01-01 RX ADMIN — HYDROCORTISONE 50 MILLIGRAM(S): 100 ENEMA RECTAL at 05:18

## 2025-01-01 RX ADMIN — DEXTROSE MONOHYDRATE 50 MILLILITER(S): 25 INJECTION, SOLUTION INTRAVENOUS at 06:14

## 2025-01-01 RX ADMIN — SODIUM CHLORIDE 1000 MILLILITER(S): 9 INJECTION, SOLUTION INTRAVENOUS at 04:00

## 2025-01-01 RX ADMIN — FENTANYL 50 MICROGRAM(S): 75 PATCH, EXTENDED RELEASE TRANSDERMAL at 13:24

## 2025-01-01 RX ADMIN — PROPOFOL 8.04 MICROGRAM(S)/KG/MIN: 10 INJECTION, EMULSION INTRAVENOUS at 02:42

## 2025-01-01 RX ADMIN — CALCIUM GLUCONATE 100 GRAM(S): 94 INJECTION, SOLUTION INTRAVENOUS at 18:47

## 2025-01-01 RX ADMIN — PIPERACILLIN AND TAZOBACTAM 25 GRAM(S): 3; .375 INJECTION, POWDER, LYOPHILIZED, FOR SOLUTION INTRAVENOUS at 05:19

## 2025-01-01 RX ADMIN — CHLORHEXIDINE GLUCONATE 1 APPLICATION(S): 1.2 RINSE ORAL at 19:22

## 2025-01-01 RX ADMIN — PIPERACILLIN AND TAZOBACTAM 25 GRAM(S): 3; .375 INJECTION, POWDER, LYOPHILIZED, FOR SOLUTION INTRAVENOUS at 16:03

## 2025-01-01 RX ADMIN — CHLORHEXIDINE GLUCONATE 15 MILLILITER(S): 1.2 RINSE ORAL at 19:22

## 2025-01-01 RX ADMIN — MIDAZOLAM HYDROCHLORIDE 1.35 MG/KG/HR: 1 INJECTION INTRAMUSCULAR; INTRAVENOUS at 17:21

## 2025-01-01 RX ADMIN — HYDROCORTISONE 50 MILLIGRAM(S): 100 ENEMA RECTAL at 05:37

## 2025-01-01 RX ADMIN — LEVOTHYROXINE SODIUM 60 MICROGRAM(S): 175 TABLET ORAL at 22:12

## 2025-01-01 RX ADMIN — NOREPINEPHRINE BITARTRATE 75.8 MICROGRAM(S)/KG/MIN: 1 INJECTION INTRAVENOUS at 08:39

## 2025-01-01 RX ADMIN — SODIUM CHLORIDE 1000 MILLILITER(S): 9 INJECTION, SOLUTION INTRAMUSCULAR; INTRAVENOUS; SUBCUTANEOUS at 17:06

## 2025-01-01 RX ADMIN — HYDROCORTISONE 50 MILLIGRAM(S): 100 ENEMA RECTAL at 05:19

## 2025-01-01 RX ADMIN — Medication 0.5 MILLIGRAM(S): at 20:15

## 2025-01-01 RX ADMIN — Medication 81 MILLIGRAM(S): at 12:54

## 2025-01-01 RX ADMIN — NOREPINEPHRINE BITARTRATE 75.8 MICROGRAM(S)/KG/MIN: 1 INJECTION INTRAVENOUS at 22:03

## 2025-01-01 RX ADMIN — DEXTROSE MONOHYDRATE 100 MILLILITER(S): 25 INJECTION, SOLUTION INTRAVENOUS at 10:49

## 2025-01-01 RX ADMIN — HEPARIN SODIUM 5000 UNIT(S): 1000 INJECTION, SOLUTION INTRAVENOUS; SUBCUTANEOUS at 21:15

## 2025-01-01 RX ADMIN — ACETAMINOPHEN 1000 MILLIGRAM(S): 80 SOLUTION/ DROPS ORAL at 01:30

## 2025-01-01 RX ADMIN — MIDAZOLAM HYDROCHLORIDE 1.35 MG/KG/HR: 1 INJECTION INTRAMUSCULAR; INTRAVENOUS at 05:33

## 2025-01-01 RX ADMIN — HEPARIN SODIUM 5000 UNIT(S): 1000 INJECTION, SOLUTION INTRAVENOUS; SUBCUTANEOUS at 05:19

## 2025-01-01 RX ADMIN — NOREPINEPHRINE BITARTRATE 6.32 MICROGRAM(S)/KG/MIN: 1 INJECTION INTRAVENOUS at 14:01

## 2025-01-01 RX ADMIN — CHLORHEXIDINE GLUCONATE 1 APPLICATION(S): 1.2 RINSE ORAL at 05:19

## 2025-01-01 RX ADMIN — NOREPINEPHRINE BITARTRATE 75.8 MICROGRAM(S)/KG/MIN: 1 INJECTION INTRAVENOUS at 04:24

## 2025-01-01 RX ADMIN — Medication 1 MILLIGRAM(S): at 17:03

## 2025-01-01 RX ADMIN — ACETAMINOPHEN 400 MILLIGRAM(S): 80 SOLUTION/ DROPS ORAL at 13:12

## 2025-01-01 RX ADMIN — HYDROCORTISONE 50 MILLIGRAM(S): 100 ENEMA RECTAL at 17:19

## 2025-01-01 RX ADMIN — GLYCOPYRROLATE 0.4 MILLIGRAM(S): 0.2 INJECTION, SOLUTION INTRAMUSCULAR; INTRAVENOUS at 16:55

## 2025-01-01 RX ADMIN — PROPOFOL 8.04 MICROGRAM(S)/KG/MIN: 10 INJECTION, EMULSION INTRAVENOUS at 21:14

## 2025-01-01 RX ADMIN — Medication 0.5 MILLIGRAM(S): at 19:33

## 2025-01-01 RX ADMIN — ACETAMINOPHEN 400 MILLIGRAM(S): 80 SOLUTION/ DROPS ORAL at 00:48

## 2025-01-01 RX ADMIN — NOREPINEPHRINE BITARTRATE 75.8 MICROGRAM(S)/KG/MIN: 1 INJECTION INTRAVENOUS at 00:50

## 2025-01-01 RX ADMIN — PIPERACILLIN AND TAZOBACTAM 25 GRAM(S): 3; .375 INJECTION, POWDER, LYOPHILIZED, FOR SOLUTION INTRAVENOUS at 05:33

## 2025-01-01 RX ADMIN — HEPARIN SODIUM 5000 UNIT(S): 1000 INJECTION, SOLUTION INTRAVENOUS; SUBCUTANEOUS at 23:21

## 2025-01-01 RX ADMIN — HYDROCORTISONE 50 MILLIGRAM(S): 100 ENEMA RECTAL at 23:07

## 2025-01-01 RX ADMIN — INSULIN HUMAN 5 UNIT(S): 100 INJECTION, SOLUTION SUBCUTANEOUS at 10:45

## 2025-01-01 RX ADMIN — HEPARIN SODIUM 5000 UNIT(S): 1000 INJECTION, SOLUTION INTRAVENOUS; SUBCUTANEOUS at 22:12

## 2025-01-01 RX ADMIN — FENTANYL 3.35 MICROGRAM(S)/KG/HR: 75 PATCH, EXTENDED RELEASE TRANSDERMAL at 13:38

## 2025-01-01 RX ADMIN — PIPERACILLIN AND TAZOBACTAM 25 GRAM(S): 3; .375 INJECTION, POWDER, LYOPHILIZED, FOR SOLUTION INTRAVENOUS at 19:22

## 2025-01-01 RX ADMIN — PIPERACILLIN AND TAZOBACTAM 3.38 GRAM(S): 3; .375 INJECTION, POWDER, LYOPHILIZED, FOR SOLUTION INTRAVENOUS at 17:06

## 2025-01-01 RX ADMIN — NOREPINEPHRINE BITARTRATE 75.8 MICROGRAM(S)/KG/MIN: 1 INJECTION INTRAVENOUS at 19:48

## 2025-01-01 RX ADMIN — SODIUM BICARBONATE 650 MILLIGRAM(S): 84 INJECTION, SOLUTION INTRAVENOUS at 11:56

## 2025-01-01 RX ADMIN — NOREPINEPHRINE BITARTRATE 75.8 MICROGRAM(S)/KG/MIN: 1 INJECTION INTRAVENOUS at 00:49

## 2025-01-01 RX ADMIN — ETOMIDATE 20 MILLIGRAM(S): 40 INJECTION, SOLUTION INTRAVENOUS at 17:06

## 2025-01-01 RX ADMIN — SODIUM CHLORIDE 1000 MILLILITER(S): 9 INJECTION, SOLUTION INTRAMUSCULAR; INTRAVENOUS; SUBCUTANEOUS at 16:37

## 2025-01-01 RX ADMIN — HEPARIN SODIUM 5000 UNIT(S): 1000 INJECTION, SOLUTION INTRAVENOUS; SUBCUTANEOUS at 05:34

## 2025-01-01 RX ADMIN — PROPOFOL 8.04 MICROGRAM(S)/KG/MIN: 10 INJECTION, EMULSION INTRAVENOUS at 15:37

## 2025-01-01 RX ADMIN — HYDROCORTISONE 50 MILLIGRAM(S): 100 ENEMA RECTAL at 12:53

## 2025-01-01 RX ADMIN — VANCOMYCIN HYDROCHLORIDE 250 MILLIGRAM(S): 5 INJECTION, POWDER, LYOPHILIZED, FOR SOLUTION INTRAVENOUS at 13:46

## 2025-01-01 RX ADMIN — LEVOTHYROXINE SODIUM 60 MICROGRAM(S): 175 TABLET ORAL at 23:21

## 2025-01-01 RX ADMIN — LORAZEPAM 2 MILLIGRAM(S): 1 TABLET ORAL at 16:55

## 2025-01-01 RX ADMIN — CHLORHEXIDINE GLUCONATE 15 MILLILITER(S): 1.2 RINSE ORAL at 06:59

## 2025-01-01 RX ADMIN — SODIUM BICARBONATE 50 MILLIEQUIVALENT(S): 84 INJECTION, SOLUTION INTRAVENOUS at 12:08

## 2025-01-01 RX ADMIN — PROPOFOL 8.04 MICROGRAM(S)/KG/MIN: 10 INJECTION, EMULSION INTRAVENOUS at 23:40

## 2025-01-01 RX ADMIN — HYDROCORTISONE 50 MILLIGRAM(S): 100 ENEMA RECTAL at 17:48

## 2025-01-01 RX ADMIN — NOREPINEPHRINE BITARTRATE 75.8 MICROGRAM(S)/KG/MIN: 1 INJECTION INTRAVENOUS at 12:53

## 2025-01-01 RX ADMIN — HEPARIN SODIUM 5000 UNIT(S): 1000 INJECTION, SOLUTION INTRAVENOUS; SUBCUTANEOUS at 14:31

## 2025-01-01 RX ADMIN — SODIUM BICARBONATE 650 MILLIGRAM(S): 84 INJECTION, SOLUTION INTRAVENOUS at 05:21

## 2025-01-01 RX ADMIN — CHLORHEXIDINE GLUCONATE 15 MILLILITER(S): 1.2 RINSE ORAL at 17:20

## 2025-01-01 RX ADMIN — VANCOMYCIN HYDROCHLORIDE 250 MILLIGRAM(S): 5 INJECTION, POWDER, LYOPHILIZED, FOR SOLUTION INTRAVENOUS at 14:29

## 2025-01-01 RX ADMIN — Medication 81 MILLIGRAM(S): at 11:57

## 2025-01-01 RX ADMIN — HYDROCORTISONE 50 MILLIGRAM(S): 100 ENEMA RECTAL at 00:48

## 2025-01-01 RX ADMIN — DEXTROSE MONOHYDRATE 50 MILLILITER(S): 25 INJECTION, SOLUTION INTRAVENOUS at 06:45

## 2025-01-01 RX ADMIN — FENTANYL 50 MICROGRAM(S): 75 PATCH, EXTENDED RELEASE TRANSDERMAL at 12:54

## 2025-01-01 RX ADMIN — INSULIN HUMAN 2 UNIT(S): 100 INJECTION, SOLUTION SUBCUTANEOUS at 06:14

## 2025-01-01 RX ADMIN — SODIUM BICARBONATE 650 MILLIGRAM(S): 84 INJECTION, SOLUTION INTRAVENOUS at 00:47

## 2025-01-01 RX ADMIN — CHLORHEXIDINE GLUCONATE 15 MILLILITER(S): 1.2 RINSE ORAL at 05:18

## 2025-01-01 RX ADMIN — Medication 81 MILLIGRAM(S): at 11:04

## 2025-01-01 RX ADMIN — SODIUM ZIRCONIUM CYCLOSILICATE 5 GRAM(S): 10 POWDER, FOR SUSPENSION ORAL at 18:47

## 2025-01-01 RX ADMIN — PROPOFOL 8.04 MICROGRAM(S)/KG/MIN: 10 INJECTION, EMULSION INTRAVENOUS at 21:57

## 2025-01-07 PROBLEM — I10 ESSENTIAL (PRIMARY) HYPERTENSION: Chronic | Status: ACTIVE | Noted: 2024-01-01

## 2025-01-07 PROBLEM — M35.3 POLYMYALGIA RHEUMATICA: Chronic | Status: ACTIVE | Noted: 2024-01-01

## 2025-01-10 NOTE — PATIENT PROFILE ADULT - PUBLIC BENEFITS - OTHER DETAILS
current home care, family  states needs to make sure everything is good with insurance as patient  usually goes to [facility].

## 2025-01-10 NOTE — PROCEDURE NOTE - NSPROCDETAILS_GEN_ALL_CORE
nasogastric/audible air bolus/placement confirmed by auscultation/gastric secretions aspirated, placement confirmed/bowel sounds present to 4 quadrants/connected to suction
guidewire recovered/lumen(s) aspirated and flushed/sterile dressing applied/sterile technique, catheter placed/ultrasound guidance with use of sterile gel and probe cove

## 2025-01-10 NOTE — H&P ADULT - ASSESSMENT
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, hypothyroidism, polymyalgia on prednisone, Presented to ED for altered mentation Admitted for septic shock with unknown origin    Plan:  Neuro:  #Metabolic encephalopathy   f/u CTH  on sedation with midazolam   SAT per protocol     Cardiovascular:  #Septic shock   s/p 2 L LR  now on levophed to maintain MAP > 65  wean as tolerated     #NSTEMI  type 2 2/2 sepsis   EKG nn ischemic changes   Trend troponin   f/u Echo     #Carotid stenosis   on Plavix   c/w home med    Pulmonary:   #Intubated for airway protection   f/u CT chest   SBT per protocol     Infectious Diseases:  #Meets SIRS criteria   unknown origin   flu/cpvid/ RSV neg  CXR no opacity   f/u Bcx, Ua, Ucx   f/u CT chest abdomen and pelvis  s/p vanc and zosyn   c/w current abx     Gastrointestinal:  #Tongue and jaw cancer  s/p chemo and radiotherapy years ago     #NGT for feeds   once pressor requirement reduces      #Transaminitis   likely 2/2 sepsis   trend LFTs    Renal:  #Metabolic Acidosis   #Lactic acidosis   2/2 spesis   Tx as above   Trend lactate     #AKSHAT   creatinine 3.33, baseline 0.86  likely pre-renal in setting of poor oral intake   Monitor BMP  Montaño catheter      Heme/onc:   #Anemia   no signs of bleeding   monitor    transfuse for Hb < 8    Endo:   #Hypothyroid  on synth 125mg   start IV 1/2 dose   f/u TSH     #Fibromyalgia   on prednisone for 1 yr  on Solu-cortef 50 q6      Skin/ catheter:   #Montaño   #peripheral IVs    Prophylaxis:   #GI ppx with pantoprazole   #DVT ppx with Hep sq    Goals of Care: Full code     Dispo:   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, hypothyroidism, polymyalgia on prednisone, Presented to ED for altered mentation Admitted for septic shock with unknown origin                     ASSESSMENT     - Septic shock   - AMS / Encephalopathy   - Acute hypoxic resp failure   - NSTEMI   - HTN HLD   - Throat / mandible ca   - Carotid stenosis   - Polymyalgia       Plan     - Admit to ICU   - Sedated and pain meds   - CT head   -Vent support , adjust per ABG   - Hemodynamic support   - Vaso-active agent titrate to maintain MAP>65  - 2 DECHO   - Trend troponin   - Cards eval   - No beta- or ACE- due to shock   - Stress dose steroid sine pat is on chronic prednisone   - Trend trop   - Broad spec antibx   - F/U cultures   - Monitor blood glucose with coverage   - Monitor LFT   - Trend Lactate   - Montaño cath   - Monitor renal fx'  - Check urine lytes . US   - Neph eval   - DVT GI prophy

## 2025-01-10 NOTE — PATIENT PROFILE ADULT - HOME ACCESSIBILITY CONCERNS
Advise clinic appointment in the next few weeks  Please call    Kevin Esquivel MD     stairs to enter home/stairs within home

## 2025-01-10 NOTE — H&P ADULT - HISTORY OF PRESENT ILLNESS
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 Not eating, feeling nauseous. He noticed yellow sputum with intermittent cough and excessive salivation. Denies any chest pain, shortness of breath, fevers, diarrhea, vomiting. Patient has constipation at baseline, Son and aid has similar symptoms at home. Patient unable to provide history.    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 Not eating, feeling nauseous. He noticed yellow sputum with intermittent cough and excessive salivation. Denies any chest pain, shortness of breath, fevers, diarrhea, vomiting. Patient has constipation at baseline, Son and aid has similar symptoms at home. Patient unable to provide history.    In ED  pt on NRB> obtunded> ETT  s/p vanc and zosyn   Trop 25k

## 2025-01-10 NOTE — ED PROVIDER NOTE - PROGRESS NOTE DETAILS
Labs are notable for leukocytosis, lactate of 4.5,   Trop >25k likely demand in the setting of sepsis. Remains hypotensive status post fluids, received 1L NS  by EMS,  norepi gtt. started. Pt  only responsive to painful stimuli, on a nonrebreather but maintaining airway. ICU consulted.

## 2025-01-10 NOTE — ED PROVIDER NOTE - PHYSICAL EXAMINATION
Gen: toxic appearing A&O x 0  Head: NCAT  HEENT: EOMI, oral mucosa dry, normal conjunctiva  Lung: CTAB, no respiratory distress, no wheezes/rhonchi/rales B/L  CV: RRR, no murmurs  Abd: non distended, soft, nontender, no guarding, no CVA tenderness  MSK: no visible deformities  Neuro- minimally responsive to painful stimuli  Skin: Warm, well perfused, no rash

## 2025-01-10 NOTE — PROCEDURE NOTE - NSPOSTPRCRAD_GEN_A_CORE
post-procedure radiography performed
central line located in the/central line located in the superior vena cava/no pneumothorax

## 2025-01-10 NOTE — H&P ADULT - NS ATTEND AMEND GEN_ALL_CORE FT
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, hypothyroidism, polymyalgia on prednisone, Presented to ED for altered mentation Admitted for septic shock with unknown origin    Plan:  Neuro:  #Metabolic encephalopathy   f/u CTH  on sedation with midazolam   SAT per protocol     Cardiovascular:  #Septic shock   s/p 2 L LR  now on levophed to maintain MAP > 65  wean as tolerated     #NSTEMI  type 2 2/2 sepsis   EKG nn ischemic changes   Trend troponin   f/u Echo     #Carotid stenosis   on Plavix   c/w home med    Pulmonary:   #Intubated for airway protection   f/u CT chest   SBT per protocol     Infectious Diseases:  #Meets SIRS criteria   unknown origin   flu/cpvid/ RSV neg  CXR no opacity   f/u Bcx, Ua, Ucx   f/u CT chest abdomen and pelvis  s/p vanc and zosyn   c/w current abx     Gastrointestinal:  #Tongue and jaw cancer  s/p chemo and radiotherapy years ago     #NGT for feeds   once pressor requirement reduces      #Transaminitis   likely 2/2 sepsis   trend LFTs    Renal:  #Metabolic Acidosis   #Lactic acidosis   2/2 spesis   Tx as above   Trend lactate     #AKSHAT   creatinine 3.33, baseline 0.86  likely pre-renal in setting of poor oral intake   Monitor BMP  Montaño catheter      Heme/onc:   #Anemia   no signs of bleeding   monitor    transfuse for Hb < 8    Endo:   #Hypothyroid  on synth 125mg   start IV 1/2 dose   f/u TSH     #Fibromyalgia   on prednisone for 1 yr  on Solu-cortef 50 q6      Skin/ catheter:   #Montaño   #peripheral IVs    Prophylaxis:   #GI ppx with pantoprazole   #DVT ppx with Hep sq    Goals of Care: Full code 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, hypothyroidism, polymyalgia on prednisone, Presented to ED for altered mentation Admitted for septic shock with unknown origin                     ASSESSMENT     - Septic shock   - AMS / Encephalopathy   - Acute hypoxic resp failure   - NSTEMI   - HTN HLD   - Throat / mandible ca   - Carotid stenosis   - Polymyalgia       Plan     - Admit to ICU   - Sedated and pain meds   - CT head   -Vent support , adjust per ABG   - Hemodynamic support   - Shock resistant to ivf    - Vaso-active agent titrate to maintain MAP>65  - 2 DECHO   - Trend troponin   - Cards eval   - No beta- or ACE- due to shock   - Stress dose steroid sine pat is on chronic prednisone   - Trend trop   - Broad spec antibx   - F/U cultures   - Monitor blood glucose with coverage   - Monitor LFT   - Trend Lactate   - Montaño cath   - Monitor renal fx'  - Check urine lytes . US   - Neph eval   - DVT GI prophy

## 2025-01-10 NOTE — ED ADULT NURSE NOTE - OBJECTIVE STATEMENT
Pt presents to ED BIBA accompanied by  c/o AMS. States that patient was found unresponsive this morning and was acting normally yesterday. Patient has PMHx of HTN, HLD, hypothyroidism

## 2025-01-10 NOTE — AIRWAY PLACEMENT NOTE ADULT - POST AIRWAY PLACEMENT ASSESSMENT:
breath sounds bilateral/breath sounds equal/positive end tidal CO2 noted/CXR pending breath sounds bilateral/breath sounds equal/positive end tidal CO2 noted/CXR pending/chest excursion noted/skin color improved

## 2025-01-10 NOTE — PATIENT PROFILE ADULT - FALL HARM RISK - HARM RISK INTERVENTIONS

## 2025-01-10 NOTE — ED ADULT NURSE NOTE - NSFALLRISKINTERV_ED_ALL_ED

## 2025-01-10 NOTE — CONSULT NOTE ADULT - SUBJECTIVE AND OBJECTIVE BOX
PATIENT SEEN AND EXAMINED BY ISELA LOPEZ M.D. ON :- 1/10/25  DATE OF SERVICE:    1/10/25         Interim events noted,Labs ,Radiological studies and Cardiology tests reviewed .    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 Not eating, feeling nauseous. He noticed yellow sputum with intermittent cough and excessive salivation. Denies any chest pain, shortness of breath, fevers, diarrhea, vomiting. Patient has constipation at baseline, Son and aid has similar symptoms at home. Patient unable to provide history.    In ED  pt on NRB> obtunded> ETT  s/p vanc and zosyn   Trop 25k  (10 Raudel 2025 17:14)      PAST MEDICAL & SURGICAL HISTORY:  Hypothyroid      Hyperlipidemia      Oral Cancer      Basal Cell Cancer  biopsy of nose  5/2010      Malignant neoplasm of mandible  left- 2008      Malignant neoplasm of tongue      Radiation therapy complication  burn to left neck area      History of radiation therapy  2008      Radiation dermatitis  left neck      Benign essential HTN      Polymyalgia rheumatica      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      Hardware complicating wound infection  S/P removal of hardware 6/2008      S/P biopsy  tongue lesion 7/2014      H/O right breast biopsy  1980's      History of cataract  left and right ; 6/2017, 12/2017          PREVIOUS DIAGNOSTIC TESTING:      ECHO  FINDINGS:    STRESS  FINDINGS:    CATHETERIZATION  FINDINGS:    MEDICATIONS  (STANDING):  chlorhexidine 0.12% Liquid 15 milliLiter(s) Oral Mucosa every 12 hours  chlorhexidine 2% Cloths 1 Application(s) Topical <User Schedule>  heparin   Injectable 5000 Unit(s) SubCutaneous every 8 hours  hydrocortisone sodium succinate Injectable 50 milliGRAM(s) IV Push every 6 hours  levothyroxine Injectable 60 MICROGram(s) IV Push at bedtime  midazolam Infusion 0.02 mG/kG/Hr (1.35 mL/Hr) IV Continuous <Continuous>  norepinephrine Infusion 1.2 MICROgram(s)/kG/Min (75.8 mL/Hr) IV Continuous <Continuous>  pantoprazole  Injectable 40 milliGRAM(s) IV Push daily    MEDICATIONS  (PRN):  sodium chloride 0.9% lock flush 10 milliLiter(s) IV Push every 1 hour PRN Pre/post blood products, medications, blood draw, and to maintain line patency      FAMILY HISTORY:  Family history of MI (myocardial infarction) (Father)        SOCIAL HISTORY:    CIGARETTES:    ALCOHOL:    REVIEW OF SYSTEMS:  CONSTITUTIONAL: No fever, weight loss, or fatigue  EYES: No eye pain, visual disturbances, or discharge  ENMT:  No difficulty hearing, tinnitus, vertigo; No sinus or throat pain  NECK: No pain or stiffness  RESPIRATORY: No cough, wheezing, chills or hemoptysis; No shortness of breath  CARDIOVASCULAR: No chest pain, palpitations, dizziness, or leg swelling  GASTROINTESTINAL: No abdominal or epigastric pain. No nausea, vomiting, or hematemesis; No diarrhea or constipation. No melena or hematochezia.  GENITOURINARY: No dysuria, frequency, hematuria, or incontinence  NEUROLOGICAL: No headaches, memory loss, loss of strength, numbness, or tremors  SKIN: No itching, burning, rashes, or lesions   LYMPH NODES: No enlarged glands  ENDOCRINE: No heat or cold intolerance; No hair loss  MUSCULOSKELETAL: No joint pain or swelling; No muscle, back, or extremity pain  PSYCHIATRIC: No depression, anxiety, mood swings, or difficulty sleeping  HEME/LYMPH: No easy bruising, or bleeding gums  ALLERY AND IMMUNOLOGIC: No hives or eczema    Vital Signs Last 24 Hrs  T(C): 38.3 (10 Raudel 2025 21:30), Max: 38.3 (10 Raudel 2025 12:35)  T(F): 100.9 (10 Raudel 2025 21:30), Max: 101 (10 Raudel 2025 12:35)  HR: 115 (10 Raudel 2025 21:30) (80 - 115)  BP: 113/43 (10 Raudel 2025 21:30) (98/32 - 151/49)  BP(mean): 62 (10 Raudel 2025 21:30) (51 - 115)  RR: 27 (10 Raudel 2025 21:30) (16 - 31)  SpO2: 98% (10 Raudel 2025 21:30) (96% - 100%)    Parameters below as of 10 Raudel 2025 18:30  Patient On (Oxygen Delivery Method): ventilator    O2 Concentration (%): 50      PHYSICAL EXAM:  GENERAL: NAD, well-groomed, well-developed  HEAD:  Atraumatic, Normocephalic  EYES: EOMI, PERRLA, conjunctiva and sclera clear  ENMT: No tonsillar erythema, exudates, or enlargement; Moist mucous membranes, Good dentition, No lesions  NECK: Supple, No JVD, Normal thyroid  NERVOUS SYSTEM:  Alert & Oriented X3, Good concentration; Motor Strength 5/5 B/L upper and lower extremities; DTRs 2+ intact and symmetric  CHEST/LUNG: Clear to percussion bilaterally; No rales, rhonchi, wheezing, or rubs  HEART: Regular rate and rhythm; No murmurs, rubs, or gallops  ABDOMEN: Soft, Nontender, Nondistended; Bowel sounds present  EXTREMITIES:  2+ Peripheral Pulses, No clubbing, cyanosis, or edema  LYMPH: No lymphadenopathy noted  SKIN: No rashes or lesions      INTERPRETATION OF TELEMETRY:    ECG:    CHADSVASC:     LABS:                        9.6    25.52 )-----------( 207      ( 10 Raudel 2025 12:35 )             30.2     01-10    142  |  113[H]  |  28[H]  ----------------------------<  116[H]  3.7   |  16[L]  |  3.33[H]    Ca    8.2[L]      10 Raudel 2025 12:35    TPro  5.9[L]  /  Alb  2.6[L]  /  TBili  0.4  /  DBili  x   /  AST  257[H]  /  ALT  107[H]  /  AlkPhos  86  01-10        PT/INR - ( 10 Raudel 2025 12:35 )   PT: 17.6 sec;   INR: 1.51 ratio         PTT - ( 10 Raudel 2025 12:35 )  PTT:31.6 sec  Urinalysis Basic - ( 10 Raudel 2025 12:35 )    Color: x / Appearance: x / SG: x / pH: x  Gluc: 116 mg/dL / Ketone: x  / Bili: x / Urobili: x   Blood: x / Protein: x / Nitrite: x   Leuk Esterase: x / RBC: x / WBC x   Sq Epi: x / Non Sq Epi: x / Bacteria: x      Lipid Panel:   I&O's Summary      RADIOLOGY & ADDITIONAL STUDIES:

## 2025-01-10 NOTE — ED PROVIDER NOTE - CRITICAL CARE ATTENDING CONTRIBUTION TO CARE
The patient's condition is critical. Management options were put in place to ensure further deterioration does not occur. In addition to the usual care provided, I have spent additional time with this patient through but not limited to the following: additional documentation  reviewing test results,  discussing with consultants,  discussing with patient / patient's family prognosis and course of care,  reassessment of patient's status and response to interventions.     hypotension / AMS

## 2025-01-10 NOTE — ED PROVIDER NOTE - OBJECTIVE STATEMENT
84-year-old female with PMHx as listed below, presents emergency department for AMS.  Is unable to offer any complaints given her current mental status. 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 Spouse at bedside states she has been weaker over the past month intermittent cough. Notes aide was sick this week. No recent falls or trauma. Pt unable to provide ROS.

## 2025-01-10 NOTE — ED PROVIDER NOTE - CLINICAL SUMMARY MEDICAL DECISION MAKING FREE TEXT BOX
ATTG: : AMS - Concerns include but not limited to infectious etiology, neurologic, will check labs we will check CT head we will check chest x-ray, will check urinalysis, IV fluids, antibiotics, reeval for dispo

## 2025-01-10 NOTE — CHART NOTE - NSCHARTNOTEFT_GEN_A_CORE
Spoke to Cardiology Dr. Orellana      plan   - patient is not a candidate for intervention, will hold off on heparin for now   - c/w home medications via NGT   - c/w trend trop  - f/u TTE

## 2025-01-10 NOTE — PATIENT PROFILE ADULT - NSPROPTRIGHTBILLOFRIGHTS_GEN_A_NUR
Received request for patients Xarelto 10mg requiring a prior auth    BIN [id number]  [facility] [address]  KEY [id number]  Auth is submitted and pending via covermymeds patient

## 2025-01-10 NOTE — PATIENT PROFILE ADULT - DO YOU LACK THE NECESSARY SUPPORT TO HELP YOU COPE WITH LIFE CHALLENGES?
D/w Oncology - brain lesion has been stable for last 2-3 years. Hem initially OK with hep gtt with transition to Lovenox BID for home.  Monitor O2 sats.  Brain MRI: Showed punctate hemorrhage and small hemorrhagic metastatic focus.   Given hemorrhagic mets, unable for full dose anticoagulation as risk >benefit.   Neurosurgery consulted- no AC, antiplatelets for 2 weeks, repeat Head CT then.   Hem notified, agree with no AC, plan now for IVC filter.   Vascular surgery to be consulted for IVC filter placement. no

## 2025-01-10 NOTE — H&P ADULT - CONVERSATION DETAILS
An extensive goals of care conversation was held including options, risks and benefits of many medical treatments including CPR, intubation, and tube feeding. Son dread and  they understand current critical medical status. Would like all medical treatment  done including central line, arterial line

## 2025-01-10 NOTE — H&P ADULT - NSHPPHYSICALEXAM_GEN_ALL_CORE
GENERAL: obtunded, elderly female  HEAD:  Atraumatic, Normocephalic  EYES:  conjunctiva and sclera clear  NECK: Supple, No JVD, Normal thyroid  CHEST/LUNG: anterior lung rhales   HEART: tachycardic; No murmurs, rubs, or gallops  ABDOMEN: Soft, Nontender, Nondistended; Bowel sounds present  NERVOUS SYSTEM:  Alert & Oriented X0  EXTREMITIES:  2+ Peripheral Pulses, No clubbing, cyanosis, or edema  SKIN: warm dry

## 2025-01-11 NOTE — CONSULT NOTE ADULT - SUBJECTIVE AND OBJECTIVE BOX
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 Not eating, feeling nauseous. He noticed yellow sputum with intermittent cough and excessive salivation. Denies any chest pain, shortness of breath, fevers, diarrhea, vomiting. Patient has constipation at baseline  SEEN TODAY,  FAMILY  BY  HER  SIDE,  DISCUSSED  HER  CARE  WITH  HER  SON  AND  DAUGHTER   IS SEDATED,  INTUBATED, ON VENT   ALSO ON  PRESSORS    PAST MEDICAL & SURGICAL HISTORY:  Hypothyroid      Hyperlipidemia      Oral Cancer      Basal Cell Cancer  biopsy of nose  5/2010      Malignant neoplasm of mandible  left- 2008      Malignant neoplasm of tongue      Radiation therapy complication  burn to left neck area      History of radiation therapy  2008      Radiation dermatitis  left neck      Benign essential HTN      Polymyalgia rheumatica      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      Hardware complicating wound infection  S/P removal of hardware 6/2008      S/P biopsy  tongue lesion 7/2014      H/O right breast biopsy  1980's      History of cataract  left and right ; 6/2017, 12/2017        No Known Allergies    Home Medications Reviewed  Hospital Medications:   MEDICATIONS  (STANDING):  aspirin  chewable 81 milliGRAM(s) Oral daily  chlorhexidine 0.12% Liquid 15 milliLiter(s) Oral Mucosa every 12 hours  chlorhexidine 2% Cloths 1 Application(s) Topical <User Schedule>  fentaNYL    Injectable 50 MICROGram(s) IV Push once  fentaNYL   Infusion 0.5 MICROgram(s)/kG/Hr (3.35 mL/Hr) IV Continuous <Continuous>  heparin   Injectable 5000 Unit(s) SubCutaneous every 8 hours  hydrocortisone sodium succinate Injectable 50 milliGRAM(s) IV Push every 6 hours  levothyroxine Injectable 60 MICROGram(s) IV Push at bedtime  norepinephrine Infusion 1.2 MICROgram(s)/kG/Min (75.8 mL/Hr) IV Continuous <Continuous>  pantoprazole  Injectable 40 milliGRAM(s) IV Push daily  piperacillin/tazobactam IVPB.. 3.375 Gram(s) IV Intermittent every 12 hours  propofol Infusion 20 MICROgram(s)/kG/Min (8.04 mL/Hr) IV Continuous <Continuous>    SOCIAL HISTORY:  Denies ETOh,Smoking,   FAMILY HISTORY:  Family history of MI (myocardial infarction) (Father)      REVIEW OF SYSTEMS:  PT  IS INTUBATED ON  VENT   IS  SEDATED  VITALS:  T(F): 99.1 (01-11-25 @ 10:15), Max: 101.3 (01-10-25 @ 23:15)  HR: 86 (01-11-25 @ 11:46)  BP: 112/52 (01-11-25 @ 10:15)  RR: 21 (01-11-25 @ 10:15)  SpO2: 97% (01-11-25 @ 11:46)  Wt(kg): --    01-10 @ 07:01  -  01-11 @ 07:00  --------------------------------------------------------  IN: 1140.2 mL / OUT: 5 mL / NET: 1135.2 mL    01-11 @ 07:01  -  01-11 @ 12:44  --------------------------------------------------------  IN: 46.2 mL / OUT: 10 mL / NET: 36.2 mL        Weight (kg): 67 (01-10 @ 18:30)  PHYSICAL EXAM:  Constitutional: ORALLY  INTUBATED  HEENT: CONJ  PINK  Neck: No JVD  Respiratory: CTAB, no wheezes, rales or rhonchi  Cardiovascular: S1, S2, RRR  Gastrointestinal: BS+, soft, NT/ND  Extremities: No peripheral edema  Neurological ON  SEDATIVE    : HAS FOLEYS IN PLACE,  HAS  ABOUT 100CC IN  BAG  01-11    140  |  113[H]  |  39[H]  ----------------------------<  189[H]  4.2   |  16[L]  |  3.80[H]    Ca    7.6[L]      11 Jan 2025 03:00  Phos  4.1     01-11  Mg     1.6     01-11    TPro  6.2  /  Alb  2.5[L]  /  TBili  0.6  /  DBili      /  AST  343[H]  /  ALT  164[H]  /  AlkPhos  92  01-11    Creatinine Trend: 3.80 <--, 3.48 <--, 3.33 <--                        11.2   36.84 )-----------( 253      ( 11 Jan 2025 03:00 )             36.1     Urine Studies:  Urinalysis Basic - ( 11 Jan 2025 03:00 )    Color:  / Appearance:  / SG:  / pH:   Gluc: 189 mg/dL / Ketone:   / Bili:  / Urobili:    Blood:  / Protein:  / Nitrite:    Leuk Esterase:  / RBC:  / WBC    Sq Epi:  / Non Sq Epi:  / Bacteria:         RADIOLOGY & ADDITIONAL STUDIES:

## 2025-01-11 NOTE — PROGRESS NOTE ADULT - SUBJECTIVE AND OBJECTIVE BOX
PATIENT SEEN AND EXAMINED BY ISELA LOPEZ M.D. ON :- 1/11/25  DATE OF SERVICE:      1/11/25       Interim events noted,Labs ,Radiological studies and Cardiology tests reviewed .    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 Not eating, feeling nauseous. He noticed yellow sputum with intermittent cough and excessive salivation. Denies any chest pain, shortness of breath, fevers, diarrhea, vomiting. Patient has constipation at baseline, Son and aid has similar symptoms at home. Patient unable to provide history.    In ED  pt on NRB> obtunded> ETT  s/p vanc and zosyn   Trop 25k  (10 Raudel 2025 17:14)      PAST MEDICAL & SURGICAL HISTORY:  Hypothyroid      Hyperlipidemia      Oral Cancer      Basal Cell Cancer  biopsy of nose  5/2010      Malignant neoplasm of mandible  left- 2008      Malignant neoplasm of tongue      Radiation therapy complication  burn to left neck area      History of radiation therapy  2008      Radiation dermatitis  left neck      Benign essential HTN      Polymyalgia rheumatica      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      Hardware complicating wound infection  S/P removal of hardware 6/2008      S/P biopsy  tongue lesion 7/2014      H/O right breast biopsy  1980's      History of cataract  left and right ; 6/2017, 12/2017          PREVIOUS DIAGNOSTIC TESTING:      ECHO  FINDINGS:    STRESS  FINDINGS:    CATHETERIZATION  FINDINGS:    MEDICATIONS  (STANDING):  aspirin  chewable 81 milliGRAM(s) Oral daily  chlorhexidine 0.12% Liquid 15 milliLiter(s) Oral Mucosa every 12 hours  chlorhexidine 2% Cloths 1 Application(s) Topical <User Schedule>  fentaNYL   Infusion 0.5 MICROgram(s)/kG/Hr (3.35 mL/Hr) IV Continuous <Continuous>  heparin   Injectable 5000 Unit(s) SubCutaneous every 8 hours  hydrocortisone sodium succinate Injectable 50 milliGRAM(s) IV Push every 6 hours  levothyroxine Injectable 60 MICROGram(s) IV Push at bedtime  norepinephrine Infusion 1.2 MICROgram(s)/kG/Min (75.8 mL/Hr) IV Continuous <Continuous>  pantoprazole  Injectable 40 milliGRAM(s) IV Push daily  piperacillin/tazobactam IVPB.. 3.375 Gram(s) IV Intermittent every 12 hours  propofol Infusion 20 MICROgram(s)/kG/Min (8.04 mL/Hr) IV Continuous <Continuous>    MEDICATIONS  (PRN):  sodium chloride 0.9% lock flush 10 milliLiter(s) IV Push every 1 hour PRN Pre/post blood products, medications, blood draw, and to maintain line patency      FAMILY HISTORY:  Family history of MI (myocardial infarction) (Father)        SOCIAL HISTORY:    CIGARETTES:    ALCOHOL:    REVIEW OF SYSTEMS:  CONSTITUTIONAL: No fever, weight loss, or fatigue  EYES: No eye pain, visual disturbances, or discharge  ENMT:  No difficulty hearing, tinnitus, vertigo; No sinus or throat pain  NECK: No pain or stiffness  RESPIRATORY: No cough, wheezing, chills or hemoptysis; No shortness of breath  CARDIOVASCULAR: No chest pain, palpitations, dizziness, or leg swelling  GASTROINTESTINAL: No abdominal or epigastric pain. No nausea, vomiting, or hematemesis; No diarrhea or constipation. No melena or hematochezia.  GENITOURINARY: No dysuria, frequency, hematuria, or incontinence  NEUROLOGICAL: No headaches, memory loss, loss of strength, numbness, or tremors  SKIN: No itching, burning, rashes, or lesions   LYMPH NODES: No enlarged glands  ENDOCRINE: No heat or cold intolerance; No hair loss  MUSCULOSKELETAL: No joint pain or swelling; No muscle, back, or extremity pain  PSYCHIATRIC: No depression, anxiety, mood swings, or difficulty sleeping  HEME/LYMPH: No easy bruising, or bleeding gums  ALLERY AND IMMUNOLOGIC: No hives or eczema    Vital Signs Last 24 Hrs  T(C): 37.7 (11 Jan 2025 22:24), Max: 38.5 (10 Raudel 2025 23:15)  T(F): 99.9 (11 Jan 2025 22:24), Max: 101.3 (10 Raudel 2025 23:15)  HR: 84 (11 Jan 2025 22:24) (82 - 115)  BP: 116/47 (11 Jan 2025 22:24) (60/47 - 149/60)  BP(mean): 66 (11 Jan 2025 22:24) (52 - 85)  RR: 16 (11 Jan 2025 22:24) (0 - 30)  SpO2: 99% (11 Jan 2025 22:24) (93% - 100%)          PHYSICAL EXAM:  GENERAL: NAD, well-groomed, well-developed  HEAD:  Atraumatic, Normocephalic  EYES: EOMI, PERRLA, conjunctiva and sclera clear  ENMT: No tonsillar erythema, exudates, or enlargement; Moist mucous membranes, Good dentition, No lesions  NECK: Supple, No JVD, Normal thyroid  NERVOUS SYSTEM:  Alert & Oriented X3, Good concentration; Motor Strength 5/5 B/L upper and lower extremities; DTRs 2+ intact and symmetric  CHEST/LUNG: Clear to percussion bilaterally; No rales, rhonchi, wheezing, or rubs  HEART: Regular rate and rhythm; No murmurs, rubs, or gallops  ABDOMEN: Soft, Nontender, Nondistended; Bowel sounds present  EXTREMITIES:  2+ Peripheral Pulses, No clubbing, cyanosis, or edema  LYMPH: No lymphadenopathy noted  SKIN: No rashes or lesions      INTERPRETATION OF TELEMETRY:    ECG:    [facility]:     LABS:                        11.2   36.84 )-----------( 253      ( 11 Jan 2025 03:00 )             36.1     01-11    140  |  113[H]  |  39[H]  ----------------------------<  189[H]  4.2   |  16[L]  |  3.80[H]    Ca    7.6[L]      11 Jan 2025 03:00  Phos  4.1     01-11  Mg     1.6     01-11    TPro  6.2  /  Alb  2.5[L]  /  TBili  0.6  /  DBili  x   /  AST  343[H]  /  ALT  164[H]  /  AlkPhos  92  01-11        PT/INR - ( 10 Raudel 2025 12:35 )   PT: 17.6 sec;   INR: 1.51 ratio         PTT - ( 10 Raudel 2025 12:35 )  PTT:31.6 sec  Urinalysis Basic - ( 11 Jan 2025 03:00 )    Color: x / Appearance: x / SG: x / pH: x  Gluc: 189 mg/dL / Ketone: x  / Bili: x / Urobili: x   Blood: x / Protein: x / Nitrite: x   Leuk Esterase: x / RBC: x / WBC x   Sq Epi: x / Non Sq Epi: x / Bacteria: x      Lipid Panel:   I&O's Summary    10 Raudel 2025 07:01  -  11 Jan 2025 07:00  --------------------------------------------------------  IN: 1140.2 mL / OUT: 5 mL / NET: 1135.2 mL    11 Jan 2025 07:01  -  11 Jan 2025 23:13  --------------------------------------------------------  IN: 1039.6 mL / OUT: 20 mL / NET: 1019.7 mL        RADIOLOGY & ADDITIONAL STUDIES:      < from: TTE W or WO Ultrasound Enhancing Agent (01.11.25 @ 09:53) >  CONCLUSIONS:      1. Left ventricular cavity is normal in size. Left ventricular wall thickness is moderately increased. Left ventricular systolic function is normal with an ejection fraction visually estimated at 55 to 60 %.   2. There is moderate (grade 2) left ventricular diastolic dysfunction.   3. Normal right ventricular cavity size and normal right ventricular systolic function.   4. Left atrium is mildly dilated.   5. The right atrium is dilated.   6. Trileafletaortic valve. Mild aortic stenosis.   7. No evidence of aortic regurgitation.   8. Mild mitral valve leaflet calcification.   9. There is moderate posterior calcification of the mitral valve annulus.  10. No mitral valve stenosis.  11. Trace mitral regurgitation.  12. Mild pulmonary hypertension.  13. No pericardial effusion seen.  14. No prior echocardiogram is available for comparison.    < end of copied text >

## 2025-01-11 NOTE — PROGRESS NOTE ADULT - ATTENDING COMMENTS
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, hypothyroidism, polymyalgia on prednisone, Presented to ED for altered mentation Admitted for septic shock with unknown origin                     ASSESSMENT     - Septic shock   - Bacteremia   - AMS / Encephalopathy   - Acute hypoxic resp failure   - NSTEMI   - HTN HLD   - Throat / mandible ca   - Carotid stenosis   - Polymyalgia       Plan       - Sedated and pain meds   - CT head   -Vent support , adjust per ABG   - Hemodynamic support   - Shock resistant to ivf    - Vaso-active agent titrate to maintain MAP>65  - 2 DECHO   - Trend troponin   - Cards eval   - No beta- or ACE- due to shock   - Stress dose steroid sine pat is on chronic prednisone   - Trend trop   - Broad spec antibx   - F/U cultures   - repat BC 1/3  - Monitor blood glucose with coverage   - Monitor LFT   - Trend Lactate   - Montaño cath   - Monitor renal fx'  - Check urine lytes . US   - Neph eval   - DVT GI prophy.  - DNR     spoke with 3 child and  at bedside with dr Orellana ( cards)

## 2025-01-11 NOTE — CONSULT NOTE ADULT - ASSESSMENT
A/P  PT  WITH AKSHAT  SEC  TO ATN    LOW  BP  REQUIRING  PRESSORS    ON  HIGH  DOSE AT THE MOMENT     HAS  100CC URINE  IN  BAG    ONCE HER  BP  IMPROVES  AND  IS ON  MINIMAL  DOSE  OF  PRESSORS  CAN  GIVE  FUROSEMIDE 40M G IV  ONCE AND  FOLLOW   URINE OUTPUT    CONT TO  HAVE HIGH  WBC-  ON  ABX   AS PER  ICE  ONCE  STABLE -  SHOULD  GET   RENAL SONO  WILL FOLLOW
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, hypothyroidism, polymyalgia on prednisone, Presented to ED for altered mentation Admitted for septic shock with unknown origin    #Metabolic encephalopathy   f/u CTH  on sedation with midazolam   SAT per protocol     Cardiovascular:  #Septic shock   s/p 2 L LR  now on levophed to maintain MAP > 65  wean as tolerated     #NSTEMI  type 2 2/2 sepsis   EKG nn ischemic changes   Trend troponin   f/u Echo     #Carotid stenosis   on Plavix   c/w home med    Pulmonary:   #Intubated for airway protection   f/u CT chest   SBT per protocol     Infectious Diseases:  #Meets SIRS criteria   unknown origin   flu/cpvid/ RSV neg  CXR no opacity   f/u Bcx, Ua, Ucx   f/u CT chest abdomen and pelvis  s/p vanc and zosyn

## 2025-01-11 NOTE — PROGRESS NOTE ADULT - ASSESSMENT
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, hypothyroidism, polymyalgia on prednisone, Presented to ED for altered mentation Admitted for septic shock with unknown origin    Plan:  Neuro:  #Metabolic encephalopathy   f/u CTH  on sedation with midazolam   SAT per protocol     Cardiovascular:  #Septic shock   s/p 2 L LR  now on levophed to maintain MAP > 65  wean as tolerated     #NSTEMI  type 2 2/2 sepsis   EKG nn ischemic changes   Trend troponin   f/u Echo     #Carotid stenosis   on Plavix   c/w home med    Pulmonary:   #Intubated for airway protection   f/u CT chest   SBT per protocol     Infectious Diseases:  #Meets SIRS criteria   unknown origin   flu/cpvid/ RSV neg  CXR no opacity   f/u Bcx, Ua, Ucx   f/u CT chest abdomen and pelvis  s/p vanc and zosyn   c/w current abx     Gastrointestinal:  #Tongue and jaw cancer  s/p chemo and radiotherapy years ago     #NGT for feeds   once pressor requirement reduces      #Transaminitis   likely 2/2 sepsis   trend LFTs    Renal:  #Metabolic Acidosis   #Lactic acidosis   2/2 spesis   Tx as above   Trend lactate     #AKSHAT   creatinine 3.33, baseline 0.86  likely pre-renal in setting of poor oral intake   Monitor BMP  Montaño catheter      Heme/onc:   #Anemia   no signs of bleeding   monitor    transfuse for Hb < 8    Endo:   #Hypothyroid  on synth 125mg   start IV 1/2 dose   f/u TSH     #Fibromyalgia   on prednisone for 1 yr  on Solu-cortef 50 q6      Skin/ catheter:   #Montaño   #peripheral IVs    Prophylaxis:   #GI ppx with pantoprazole   #DVT ppx with Hep sq    Goals of Care: Full code     Dispo:   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 Orellana    #Carotid stenosis   on Plavix   c/w home med    Pulmonary:   #Intubated for airway protection   f/u CT chest   SBT per protocol     Infectious Diseases:  #Meets SIRS criteria   unknown origin   flu/cpvid/ RSV neg  CXR no opacity   f/u Bcx, Ua, Ucx   f/u CT chest abdomen and pelvis  s/p vanc and zosyn   c/w current abx     Gastrointestinal:  #Tongue and jaw cancer  s/p chemo and radiotherapy years ago     #NGT for feeds   Started today    #Transaminitis   likely 2/2 sepsis   LFTs stable    Renal:  #Metabolic Acidosis   #Lactic acidosis   2/2 spesis   Tx as above   Trend lactate     #AKSHAT   creatinine 3.33, baseline 0.86  likely pre-renal in setting of poor oral intake   Monitor BMP  Montaño catheter      Heme/onc:   #Anemia   no signs of bleeding   monitor    transfuse for Hb < 8    Endo:   #Hypothyroid  on synth 125mg   start IV 1/2 dose   f/u TSH     #Fibromyalgia   on prednisone for 1 yr  on Solu-cortef 50 q6      Skin/ catheter:   #Montaño   #peripheral IVs    Prophylaxis:   #GI ppx with pantoprazole   #DVT ppx with Hep sq    Goals of Care: Full code     Dispo:

## 2025-01-11 NOTE — PROGRESS NOTE ADULT - ASSESSMENT
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, hypothyroidism, polymyalgia on prednisone, Presented to ED for altered mentation Admitted for septic shock with unknown origin    #Metabolic encephalopathy   f/u CTH  on sedation with midazolam   SAT per protocol     Cardiovascular:  #Septic shock   s/p 2 L LR  now on levophed to maintain MAP > 65  wean as tolerated     #NSTEMI  type 2 2/2 sepsis   EKG nn ischemic changes   Trend troponin   f/u Echo     #Carotid stenosis   on Plavix   c/w home med    Pulmonary:   #Intubated for airway protection   f/u CT chest   SBT per protocol     Infectious Diseases:  #Meets SIRS criteria   unknown origin   flu/cpvid/ RSV neg  CXR no opacity   f/u Bcx, Ua, Ucx   f/u CT chest abdomen and pelvis  s/p vanc and zosyn

## 2025-01-11 NOTE — PROGRESS NOTE ADULT - SUBJECTIVE AND OBJECTIVE BOX
INTERVAL HPI/OVERNIGHT EVENTS: ***    PRESSORS: [ ] YES [ ] NO  WHICH:    ANTIBIOTICS:                      Antimicrobial:  piperacillin/tazobactam IVPB.. 3.375 Gram(s) IV Intermittent every 12 hours    Cardiovascular:  norepinephrine Infusion 1.2 MICROgram(s)/kG/Min IV Continuous <Continuous>    Pulmonary:    Hematalogic:  heparin   Injectable 5000 Unit(s) SubCutaneous every 8 hours    Other:  chlorhexidine 0.12% Liquid 15 milliLiter(s) Oral Mucosa every 12 hours  chlorhexidine 2% Cloths 1 Application(s) Topical <User Schedule>  hydrocortisone sodium succinate Injectable 50 milliGRAM(s) IV Push every 6 hours  levothyroxine Injectable 60 MICROGram(s) IV Push at bedtime  magnesium sulfate  IVPB 2 Gram(s) IV Intermittent once  midazolam Infusion 0.02 mG/kG/Hr IV Continuous <Continuous>  pantoprazole  Injectable 40 milliGRAM(s) IV Push daily  propofol Infusion 20 MICROgram(s)/kG/Min IV Continuous <Continuous>  sodium chloride 0.9% lock flush 10 milliLiter(s) IV Push every 1 hour PRN    chlorhexidine 0.12% Liquid 15 milliLiter(s) Oral Mucosa every 12 hours  chlorhexidine 2% Cloths 1 Application(s) Topical <User Schedule>  heparin   Injectable 5000 Unit(s) SubCutaneous every 8 hours  hydrocortisone sodium succinate Injectable 50 milliGRAM(s) IV Push every 6 hours  levothyroxine Injectable 60 MICROGram(s) IV Push at bedtime  magnesium sulfate  IVPB 2 Gram(s) IV Intermittent once  midazolam Infusion 0.02 mG/kG/Hr IV Continuous <Continuous>  norepinephrine Infusion 1.2 MICROgram(s)/kG/Min IV Continuous <Continuous>  pantoprazole  Injectable 40 milliGRAM(s) IV Push daily  piperacillin/tazobactam IVPB.. 3.375 Gram(s) IV Intermittent every 12 hours  propofol Infusion 20 MICROgram(s)/kG/Min IV Continuous <Continuous>  sodium chloride 0.9% lock flush 10 milliLiter(s) IV Push every 1 hour PRN    Drug Dosing Weight  Height (cm): 157.5 (20 Nov 2024 11:18)  Weight (kg): 67 (10 Raudel 2025 18:30)  BMI (kg/m2): 27 (10 Raudel 2025 18:30)  BSA (m2): 1.68 (10 Raudel 2025 18:30)    CENTRAL LINE: [ ] YES [ ] NO  LOCATION:   DATE INSERTED:  REMOVE: [ ] YES [ ] NO  EXPLAIN:    BAPTISTE: [ ] YES [ ] NO    DATE INSERTED:  REMOVE:  [ ] YES [ ] NO  EXPLAIN:    A-LINE:  [ ] YES [ ] NO  LOCATION:   DATE INSERTED:  REMOVE:  [ ] YES [ ] NO  EXPLAIN:    PMH -reviewed admission note, no change since admission    ICU Vital Signs Last 24 Hrs  T(C): 38.2 (11 Jan 2025 01:15), Max: 38.5 (10 Raudel 2025 23:15)  T(F): 100.8 (11 Jan 2025 01:15), Max: 101.3 (10 Raudel 2025 23:15)  HR: 113 (11 Jan 2025 01:15) (80 - 115)  BP: 134/60 (11 Jan 2025 01:15) (98/32 - 151/49)  BP(mean): 81 (11 Jan 2025 01:15) (51 - 115)  ABP: --  ABP(mean): --  RR: 18 (11 Jan 2025 01:15) (16 - 31)  SpO2: 100% (11 Jan 2025 01:15) (96% - 100%)    O2 Parameters below as of 10 Raudel 2025 18:30  Patient On (Oxygen Delivery Method): ventilator    O2 Concentration (%): 50                  Mode: AC/ CMV (Assist Control/ Continuous Mandatory Ventilation)  RR (machine): 16  TV (machine): 350  FiO2: 50  PEEP: 5  ITime: 1  MAP: 8  PIP: 15      PHYSICAL EXAM:    GENERAL: NAD, well-groomed, well-developed  HEAD:  Atraumatic, Normocephalic  EYES: EOMI, PERRLA, conjunctiva and sclera clear  ENMT: No tonsillar erythema, exudates, or enlargement; Moist mucous membranes, Good dentition, No lesions  NECK: Supple, normal appearance, No JVD; Normal thyroid; Trachea midline  NERVOUS SYSTEM:  Alert & Oriented X3, Good concentration; Motor Strength 5/5 B/L upper and lower extremities; DTRs 2+ intact and symmetric  CHEST/LUNG: No chest deformity; Normal percussion bilaterally; No rales, rhonchi, wheezing   HEART: Regular rate and rhythm; No murmurs, rubs, or gallops  ABDOMEN: Soft, Nontender, Nondistended; Bowel sounds present  EXTREMITIES:  2+ Peripheral Pulses, No clubbing, cyanosis, or edema  LYMPH: No lymphadenopathy noted  SKIN: No rashes or lesions; Good capillary refill      LABS:  CBC Full  -  ( 10 Raudel 2025 22:08 )  WBC Count : 32.18 K/uL  RBC Count : 4.22 M/uL  Hemoglobin : 10.6 g/dL  Hematocrit : 33.6 %  Platelet Count - Automated : 252 K/uL  Mean Cell Volume : 79.6 fl  Mean Cell Hemoglobin : 25.1 pg  Mean Cell Hemoglobin Concentration : 31.5 g/dL  Auto Neutrophil # : 29.28 K/uL  Auto Lymphocyte # : 0.64 K/uL  Auto Monocyte # : 0.64 K/uL  Auto Eosinophil # : 0.00 K/uL  Auto Basophil # : 0.00 K/uL  Auto Neutrophil % : 86.0 %  Auto Lymphocyte % : 2.0 %  Auto Monocyte % : 2.0 %  Auto Eosinophil % : 0.0 %  Auto Basophil % : 0.0 %    01-10    141  |  112[H]  |  34[H]  ----------------------------<  190[H]  3.9   |  14[L]  |  3.48[H]    Ca    7.5[L]      10 Raudel 2025 22:08  Phos  3.8     01-10  Mg     1.6     01-10    TPro  5.9[L]  /  Alb  2.5[L]  /  TBili  0.5  /  DBili  x   /  AST  359[H]  /  ALT  146[H]  /  AlkPhos  96  01-10    PT/INR - ( 10 Raudel 2025 12:35 )   PT: 17.6 sec;   INR: 1.51 ratio         PTT - ( 10 Raudel 2025 12:35 )  PTT:31.6 sec  Urinalysis Basic - ( 10 Raudel 2025 22:08 )    Color: x / Appearance: x / SG: x / pH: x  Gluc: 190 mg/dL / Ketone: x  / Bili: x / Urobili: x   Blood: x / Protein: x / Nitrite: x   Leuk Esterase: x / RBC: x / WBC x   Sq Epi: x / Non Sq Epi: x / Bacteria: x      Culture Results:   Growth in aerobic bottle: Gram Negative Rods  Direct identification is available within approximately 3-5  hours either by Blood Panel Multiplexed PCR or Direct  MALDI-TOF. Details: [email or web address] (01-10 @ 12:40)      RADIOLOGY & ADDITIONAL STUDIES REVIEWED:  ***    GOALS OF CARE DISCUSSION WITH PATIENT/FAMILY/PROXY:    CRITICAL CARE TIME SPENT: 35 minutes INTERVAL HPI/OVERNIGHT EVENTS: ***    PRESSORS: [ ] YES [ ] NO  WHICH:    ANTIBIOTICS:                      Antimicrobial:  piperacillin/tazobactam IVPB.. 3.375 Gram(s) IV Intermittent every 12 hours    Cardiovascular:  norepinephrine Infusion 1.2 MICROgram(s)/kG/Min IV Continuous <Continuous>    Pulmonary:    Hematalogic:  heparin   Injectable 5000 Unit(s) SubCutaneous every 8 hours    Other:  chlorhexidine 0.12% Liquid 15 milliLiter(s) Oral Mucosa every 12 hours  chlorhexidine 2% Cloths 1 Application(s) Topical <User Schedule>  hydrocortisone sodium succinate Injectable 50 milliGRAM(s) IV Push every 6 hours  levothyroxine Injectable 60 MICROGram(s) IV Push at bedtime  magnesium sulfate  IVPB 2 Gram(s) IV Intermittent once  midazolam Infusion 0.02 mG/kG/Hr IV Continuous <Continuous>  pantoprazole  Injectable 40 milliGRAM(s) IV Push daily  propofol Infusion 20 MICROgram(s)/kG/Min IV Continuous <Continuous>  sodium chloride 0.9% lock flush 10 milliLiter(s) IV Push every 1 hour PRN    chlorhexidine 0.12% Liquid 15 milliLiter(s) Oral Mucosa every 12 hours  chlorhexidine 2% Cloths 1 Application(s) Topical <User Schedule>  heparin   Injectable 5000 Unit(s) SubCutaneous every 8 hours  hydrocortisone sodium succinate Injectable 50 milliGRAM(s) IV Push every 6 hours  levothyroxine Injectable 60 MICROGram(s) IV Push at bedtime  magnesium sulfate  IVPB 2 Gram(s) IV Intermittent once  midazolam Infusion 0.02 mG/kG/Hr IV Continuous <Continuous>  norepinephrine Infusion 1.2 MICROgram(s)/kG/Min IV Continuous <Continuous>  pantoprazole  Injectable 40 milliGRAM(s) IV Push daily  piperacillin/tazobactam IVPB.. 3.375 Gram(s) IV Intermittent every 12 hours  propofol Infusion 20 MICROgram(s)/kG/Min IV Continuous <Continuous>  sodium chloride 0.9% lock flush 10 milliLiter(s) IV Push every 1 hour PRN    Drug Dosing Weight  Height (cm): 157.5 (20 Nov 2024 11:18)  Weight (kg): 67 (10 Raudel 2025 18:30)  BMI (kg/m2): 27 (10 Raudel 2025 18:30)  BSA (m2): 1.68 (10 Raudel 2025 18:30)    CENTRAL LINE: [ ] YES [ ] NO  LOCATION:   DATE INSERTED:  REMOVE: [ ] YES [ ] NO  EXPLAIN:    BAPTISTE: [ ] YES [ ] NO    DATE INSERTED:  REMOVE:  [ ] YES [ ] NO  EXPLAIN:    A-LINE:  [ ] YES [ ] NO  LOCATION:   DATE INSERTED:  REMOVE:  [ ] YES [ ] NO  EXPLAIN:    PMH -reviewed admission note, no change since admission    ICU Vital Signs Last 24 Hrs  T(C): 38.2 (11 Jan 2025 01:15), Max: 38.5 (10 Raudel 2025 23:15)  T(F): 100.8 (11 Jan 2025 01:15), Max: 101.3 (10 Raudel 2025 23:15)  HR: 113 (11 Jan 2025 01:15) (80 - 115)  BP: 134/60 (11 Jan 2025 01:15) (98/32 - 151/49)  BP(mean): 81 (11 Jan 2025 01:15) (51 - 115)  ABP: --  ABP(mean): --  RR: 18 (11 Jan 2025 01:15) (16 - 31)  SpO2: 100% (11 Jan 2025 01:15) (96% - 100%)    O2 Parameters below as of 10 Raudel 2025 18:30  Patient On (Oxygen Delivery Method): ventilator    O2 Concentration (%): 50                  Mode: AC/ CMV (Assist Control/ Continuous Mandatory Ventilation)  RR (machine): 16  TV (machine): 350  FiO2: 50  PEEP: 5  ITime: 1  MAP: 8  PIP: 15      PHYSICAL EXAM:    GENERAL: NAD, well-groomed, well-developed  HEAD:  Atraumatic, Normocephalic  EYES: EOMI, PERRLA, conjunctiva and sclera clear  ENMT: No tonsillar erythema, exudates, or enlargement; Moist mucous membranes, Good dentition, No lesions  NECK: Supple, normal appearance, No JVD; Normal thyroid; Trachea midline  NERVOUS SYSTEM:  sedated   CHEST/LUNG: No chest deformity; Normal percussion bilaterally; No rales, rhonchi, wheezing   HEART: Regular rate and rhythm; No murmurs, rubs, or gallops  ABDOMEN: Soft, Nontender, Nondistended; Bowel sounds present  EXTREMITIES:  2+ Peripheral Pulses, No clubbing, cyanosis, or edema  LYMPH: No lymphadenopathy noted  SKIN: No rashes or lesions; Good capillary refill      LABS:  CBC Full  -  ( 10 Raudel 2025 22:08 )  WBC Count : 32.18 K/uL  RBC Count : 4.22 M/uL  Hemoglobin : 10.6 g/dL  Hematocrit : 33.6 %  Platelet Count - Automated : 252 K/uL  Mean Cell Volume : 79.6 fl  Mean Cell Hemoglobin : 25.1 pg  Mean Cell Hemoglobin Concentration : 31.5 g/dL  Auto Neutrophil # : 29.28 K/uL  Auto Lymphocyte # : 0.64 K/uL  Auto Monocyte # : 0.64 K/uL  Auto Eosinophil # : 0.00 K/uL  Auto Basophil # : 0.00 K/uL  Auto Neutrophil % : 86.0 %  Auto Lymphocyte % : 2.0 %  Auto Monocyte % : 2.0 %  Auto Eosinophil % : 0.0 %  Auto Basophil % : 0.0 %    01-10    141  |  112[H]  |  34[H]  ----------------------------<  190[H]  3.9   |  14[L]  |  3.48[H]    Ca    7.5[L]      10 Raudel 2025 22:08  Phos  3.8     01-10  Mg     1.6     01-10    TPro  5.9[L]  /  Alb  2.5[L]  /  TBili  0.5  /  DBili  x   /  AST  359[H]  /  ALT  146[H]  /  AlkPhos  96  01-10    PT/INR - ( 10 Raudel 2025 12:35 )   PT: 17.6 sec;   INR: 1.51 ratio         PTT - ( 10 Raudel 2025 12:35 )  PTT:31.6 sec  Urinalysis Basic - ( 10 Raudel 2025 22:08 )    Color: x / Appearance: x / SG: x / pH: x  Gluc: 190 mg/dL / Ketone: x  / Bili: x / Urobili: x   Blood: x / Protein: x / Nitrite: x   Leuk Esterase: x / RBC: x / WBC x   Sq Epi: x / Non Sq Epi: x / Bacteria: x      Culture Results:   Growth in aerobic bottle: Gram Negative Rods  Direct identification is available within approximately 3-5  hours either by Blood Panel Multiplexed PCR or Direct  MALDI-TOF. Details: [email or web address] (01-10 @ 12:40)      RADIOLOGY & ADDITIONAL STUDIES REVIEWED:  ***    GOALS OF CARE DISCUSSION WITH PATIENT/FAMILY/PROXY:    CRITICAL CARE TIME SPENT: 35 minutes INTERVAL HPI/OVERNIGHT EVENTS: Patient admitted yesterday, central line placed in the evening. Patient examined at bedside.    PRESSORS: [ ] YES [X] NO  WHICH:    ANTIBIOTICS:                      Antimicrobial:  piperacillin/tazobactam IVPB.. 3.375 Gram(s) IV Intermittent every 12 hours    Cardiovascular:  norepinephrine Infusion 1.2 MICROgram(s)/kG/Min IV Continuous <Continuous>    Pulmonary:    Hematalogic:  heparin   Injectable 5000 Unit(s) SubCutaneous every 8 hours    Other:  chlorhexidine 0.12% Liquid 15 milliLiter(s) Oral Mucosa every 12 hours  chlorhexidine 2% Cloths 1 Application(s) Topical <User Schedule>  hydrocortisone sodium succinate Injectable 50 milliGRAM(s) IV Push every 6 hours  levothyroxine Injectable 60 MICROGram(s) IV Push at bedtime  magnesium sulfate  IVPB 2 Gram(s) IV Intermittent once  midazolam Infusion 0.02 mG/kG/Hr IV Continuous <Continuous>  pantoprazole  Injectable 40 milliGRAM(s) IV Push daily  propofol Infusion 20 MICROgram(s)/kG/Min IV Continuous <Continuous>  sodium chloride 0.9% lock flush 10 milliLiter(s) IV Push every 1 hour PRN    chlorhexidine 0.12% Liquid 15 milliLiter(s) Oral Mucosa every 12 hours  chlorhexidine 2% Cloths 1 Application(s) Topical <User Schedule>  heparin   Injectable 5000 Unit(s) SubCutaneous every 8 hours  hydrocortisone sodium succinate Injectable 50 milliGRAM(s) IV Push every 6 hours  levothyroxine Injectable 60 MICROGram(s) IV Push at bedtime  magnesium sulfate  IVPB 2 Gram(s) IV Intermittent once  midazolam Infusion 0.02 mG/kG/Hr IV Continuous <Continuous>  norepinephrine Infusion 1.2 MICROgram(s)/kG/Min IV Continuous <Continuous>  pantoprazole  Injectable 40 milliGRAM(s) IV Push daily  piperacillin/tazobactam IVPB.. 3.375 Gram(s) IV Intermittent every 12 hours  propofol Infusion 20 MICROgram(s)/kG/Min IV Continuous <Continuous>  sodium chloride 0.9% lock flush 10 milliLiter(s) IV Push every 1 hour PRN    Drug Dosing Weight  Height (cm): 157.5 (20 Nov 2024 11:18)  Weight (kg): 67 (10 Raudel 2025 18:30)  BMI (kg/m2): 27 (10 Raudel 2025 18:30)  BSA (m2): 1.68 (10 Raudel 2025 18:30)    CENTRAL LINE: [X] YES [ ] NO  LOCATION:   DATE INSERTED:  REMOVE: [ ] YES [ ] NO  EXPLAIN:    BAPTISTE: [X] YES [ ] NO    DATE INSERTED:  REMOVE:  [ ] YES [ ] NO  EXPLAIN:    A-LINE:  [ ] YES [X] NO  LOCATION:   DATE INSERTED:  REMOVE:  [ ] YES [ ] NO  EXPLAIN:    PMH -reviewed admission note, no change since admission    ICU Vital Signs Last 24 Hrs  T(C): 38.2 (11 Jan 2025 01:15), Max: 38.5 (10 Raudel 2025 23:15)  T(F): 100.8 (11 Jan 2025 01:15), Max: 101.3 (10 Raudel 2025 23:15)  HR: 113 (11 Jan 2025 01:15) (80 - 115)  BP: 134/60 (11 Jan 2025 01:15) (98/32 - 151/49)  BP(mean): 81 (11 Jan 2025 01:15) (51 - 115)  ABP: --  ABP(mean): --  RR: 18 (11 Jan 2025 01:15) (16 - 31)  SpO2: 100% (11 Jan 2025 01:15) (96% - 100%)    O2 Parameters below as of 10 Raudel 2025 18:30  Patient On (Oxygen Delivery Method): ventilator    O2 Concentration (%): 50                  Mode: AC/ CMV (Assist Control/ Continuous Mandatory Ventilation)  RR (machine): 16  TV (machine): 350  FiO2: 50  PEEP: 5  ITime: 1  MAP: 8  PIP: 15      PHYSICAL EXAM:    GENERAL: Sedated,  elderly female  HEAD:  Atraumatic, Normocephalic  EYES:  conjunctiva and sclera clear  NECK: Supple, No JVD, Normal thyroid  CHEST/LUNG: Crackles bilaterally  HEART: tachycardic; No murmurs, rubs, or gallops  ABDOMEN: Soft, Nontender, Nondistended; Bowel sounds present  NERVOUS SYSTEM:  Alert & Oriented X0  EXTREMITIES:  2+ Peripheral Pulses, No clubbing, cyanosis, or edema  SKIN: warm dry      LABS:  CBC Full  -  ( 10 Raudel 2025 22:08 )  WBC Count : 32.18 K/uL  RBC Count : 4.22 M/uL  Hemoglobin : 10.6 g/dL  Hematocrit : 33.6 %  Platelet Count - Automated : 252 K/uL  Mean Cell Volume : 79.6 fl  Mean Cell Hemoglobin : 25.1 pg  Mean Cell Hemoglobin Concentration : 31.5 g/dL  Auto Neutrophil # : 29.28 K/uL  Auto Lymphocyte # : 0.64 K/uL  Auto Monocyte # : 0.64 K/uL  Auto Eosinophil # : 0.00 K/uL  Auto Basophil # : 0.00 K/uL  Auto Neutrophil % : 86.0 %  Auto Lymphocyte % : 2.0 %  Auto Monocyte % : 2.0 %  Auto Eosinophil % : 0.0 %  Auto Basophil % : 0.0 %    01-10    141  |  112[H]  |  34[H]  ----------------------------<  190[H]  3.9   |  14[L]  |  3.48[H]    Ca    7.5[L]      10 Raudel 2025 22:08  Phos  3.8     01-10  Mg     1.6     01-10    TPro  5.9[L]  /  Alb  2.5[L]  /  TBili  0.5  /  DBili  x   /  AST  359[H]  /  ALT  146[H]  /  AlkPhos  96  01-10    PT/INR - ( 10 Raudel 2025 12:35 )   PT: 17.6 sec;   INR: 1.51 ratio         PTT - ( 10 Raudel 2025 12:35 )  PTT:31.6 sec  Urinalysis Basic - ( 10 Raudel 2025 22:08 )    Color: x / Appearance: x / SG: x / pH: x  Gluc: 190 mg/dL / Ketone: x  / Bili: x / Urobili: x   Blood: x / Protein: x / Nitrite: x   Leuk Esterase: x / RBC: x / WBC x   Sq Epi: x / Non Sq Epi: x / Bacteria: x      Culture Results:   Growth in aerobic bottle: Gram Negative Rods  Direct identification is available within approximately 3-5  hours either by Blood Panel Multiplexed PCR or Direct  MALDI-TOF. Details: [email or web address] (01-10 @ 12:40)      RADIOLOGY & ADDITIONAL STUDIES REVIEWED:  ***    GOALS OF CARE DISCUSSION WITH PATIENT/FAMILY/PROXY:    CRITICAL CARE TIME SPENT: 35 minutes

## 2025-01-12 NOTE — DIETITIAN INITIAL EVALUATION ADULT - PERTINENT MEDS FT
MEDICATIONS  (STANDING):  aspirin  chewable 81 milliGRAM(s) Oral daily  chlorhexidine 0.12% Liquid 15 milliLiter(s) Oral Mucosa every 12 hours  chlorhexidine 2% Cloths 1 Application(s) Topical <User Schedule>  heparin   Injectable 5000 Unit(s) SubCutaneous every 8 hours  hydrocortisone sodium succinate Injectable 50 milliGRAM(s) IV Push every 6 hours  levothyroxine Injectable 60 MICROGram(s) IV Push at bedtime  norepinephrine Infusion 1.2 MICROgram(s)/kG/Min (75.8 mL/Hr) IV Continuous <Continuous>  pantoprazole  Injectable 40 milliGRAM(s) IV Push daily  piperacillin/tazobactam IVPB.. 3.375 Gram(s) IV Intermittent every 12 hours  sodium bicarbonate 650 milliGRAM(s) Oral every 6 hours  vancomycin  IVPB 1000 milliGRAM(s) IV Intermittent every 48 hours    MEDICATIONS  (PRN):  sodium chloride 0.9% lock flush 10 milliLiter(s) IV Push every 1 hour PRN Pre/post blood products, medications, blood draw, and to maintain line patency

## 2025-01-12 NOTE — PROGRESS NOTE ADULT - ASSESSMENT
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 Orellana    #Carotid stenosis   on Plavix   c/w home med    Pulmonary:   #Intubated for airway protection   f/u CT chest   SBT per protocol     Infectious Diseases:  #Meets SIRS criteria   unknown origin   flu/cpvid/ RSV neg  CXR no opacity   f/u Bcx, Ua, Ucx   f/u CT chest abdomen and pelvis  s/p vanc and zosyn   c/w current abx     Gastrointestinal:  #Tongue and jaw cancer  s/p chemo and radiotherapy years ago     #NGT for feeds   Started today    #Transaminitis   likely 2/2 sepsis   LFTs stable    Renal:  #Metabolic Acidosis   #Lactic acidosis   2/2 spesis   Tx as above   Trend lactate     #AKSHAT   creatinine 3.33, baseline 0.86  likely pre-renal in setting of poor oral intake   Monitor BMP  Montaño catheter      Heme/onc:   #Anemia   no signs of bleeding   monitor    transfuse for Hb < 8    Endo:   #Hypothyroid  on synth 125mg   start IV 1/2 dose   f/u TSH     #Fibromyalgia   on prednisone for 1 yr  on Solu-cortef 50 q6      Skin/ catheter:   #Montaño   #peripheral IVs    Prophylaxis:   #GI ppx with pantoprazole   #DVT ppx with Hep sq    Goals of Care: Full code     Dispo:   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 Orellana    #Carotid stenosis   on Plavix   c/w home med    Pulmonary:   #Intubated for airway protection   f/u CT chest   SBT per protocol     Infectious Diseases:  #Meets SIRS criteria   unknown origin   flu/cpvid/ RSV neg  CXR no opacity   f/u Bcx, Ua, Ucx   f/u CT chest abdomen and pelvis  s/p vanc and zosyn   c/w current abx     Gastrointestinal:  #Tongue and jaw cancer  s/p chemo and radiotherapy years ago     #NGT for feeds   Started today    #Transaminitis   likely 2/2 sepsis   LFTs stable    Renal:  #Metabolic Acidosis   #Lactic acidosis   2/2 spesis   -worsening acidosis  -started Na HCO3 650 Q6H    #AKSHAT   creatinine 3.33, baseline 0.86  likely pre-renal in setting of poor oral intake   Monitor BMP  Montaño catheter      Heme/onc:   #Anemia   no signs of bleeding   monitor    transfuse for Hb < 8    Endo:   #Hypothyroid  on synth 125mg   start IV 1/2 dose   TSH: 0.37    #Fibromyalgia   on prednisone for 1 yr  on Solu-cortef 50 q6      Skin/ catheter:   #Montaño   #peripheral IVs    Prophylaxis:   #GI ppx with pantoprazole   #DVT ppx with Hep sq    Goals of Care: Full code     Dispo:

## 2025-01-12 NOTE — PROGRESS NOTE ADULT - NSPROGADDITIONALINFOA_GEN_ALL_CORE
< from: TTE W or WO Ultrasound Enhancing Agent (01.11.25 @ 09:53) >    CONCLUSIONS:      1. Left ventricular cavity is normal in size. Left ventricular wall thickness is moderately increased. Left ventricular systolic function is normal with an ejection fraction visually estimated at 55 to 60 %.   2. There is moderate (grade 2) left ventricular diastolic dysfunction.   3. Normal right ventricular cavity size and normal right ventricular systolic function.   4. Left atrium is mildly dilated.   5. The right atrium is dilated.   6. Trileafletaortic valve. Mild aortic stenosis.   7. No evidence of aortic regurgitation.   8. Mild mitral valve leaflet calcification.   9. There is moderate posterior calcification of the mitral valve annulus.  10. No mitral valve stenosis.  11. Trace mitral regurgitation.  12. Mild pulmonary hypertension.  13. No pericardial effusion seen.  14. No prior echocardiogram is available for comparison.    < end of copied text >

## 2025-01-12 NOTE — DIETITIAN INITIAL EVALUATION ADULT - ORAL INTAKE PTA/DIET HISTORY
Pt is currently sedated and intubated at time of visit. H/o radiation therapy, oral and basal cell cancer; admitted for sepsis. Per discussion with RN pt is currently NPO pending diet order and propofol is not infusing at time of visit. No GI distress reported.

## 2025-01-12 NOTE — CHART NOTE - NSCHARTNOTEFT_GEN_A_CORE
Comprehensive update provided to daughter Pastora at bedside. Updated her about overnight status, course, current and next steps in the care management and expectations in the next 24 hours. Informed her regarding pt's worsening renal function. Daughter says they were advised against dialysis from their PCP. They were told it would not cause much benefit. Daughter said she ultimately wants what is best for her mother, and will discuss the matter with family and get back to us. She acknowledged to understand all the information provided. Support provided. All questions answered.

## 2025-01-12 NOTE — PROCEDURE NOTE - NSINDICATIONS_GEN_A_CORE
critical illness/emergency venous access/hypertonic/irritant infusion
critical patient/monitoring purposes
feeds

## 2025-01-12 NOTE — PROCEDURE NOTE - NSNUMATTEMPT_GEN_A_CORE
Capsule Endoscopy    Kleber Mcdaniel MD      Patient Name:  Susan Sandra  Date:  3/25/2024  Arrival Time:  8:00 a.m..   Location:  [address] [place] [address], [address]       Please read these instructions thoroughly at least 1 week before your procedure. If you have any questions about these instructions, please call your physician's office at [phone].    Please pick-up a Nulytely Prep Kit at the pharmacy your physician sent the prescription to.. Do not mix the solution until the day before the procedure.  Bring these instructions with you when you go to the pharmacy.  Do not mix the solution until the day before the procedure.  Do not take iron or iron products for one week prior to the exam  Purchase some clear liquids available to drink or eat. Clear liquids include water, fruit juices without pulp, coffee or tea without creamer, Gatorade/clear sports drinks, Popsicles, carbonated or non-carbonated soft drinks, Lm-aid or other flavored drinks, plain Jell-O without fruit or toppings, hard candy and broth.  Capsule endoscopy is contraindicated in patients with bowel obstruction, strictures, or fistulas, and swallowing disorders.  Due to everyone's busy schedules, it is important that you keep your appointment. If you must reschedule or cancel your appointment, please notify your physician's office at least 48 hours in advance.      THE DAY BEFORE YOUR CAPSULE ENDOSCOPY:    You may have only clear liquids to eat or drink all day long. You may not have any solid foods or dairy products, and you may not eat or drink anything that is red or purple. The more clear liquids you drink, the better off you will be.  Do not drink any alcoholic beverages for at least 24 hours before the procedure.  Do not take Lomotil, Imodium, Effer-syllium, Metamucil, Citrucel, Konsyl, Hydrocil, or FiberCon.  In the morning, mix the prep with the flavor packet and one gallon of water. Shake well to mix and refrigerate to 
chill. Do not further dilute the prep in any way.  Between 3:00 pm and 5:00 pm, start taking the prep. Drink one large (8-10 ounce) glass every 10-15 minutes. In most cases, loose, liquid bowel movements will begin within 30 minutes to one hour. You should remain within easy access of a bathroom.  Continue to drink the prep until half of the gallon is gone and regardless of stool frequency.    If you dislike the taste, use a straw to help drink it.  Continue to drink clear liquids (your physician recommends 64 ounces of Gatorade) throughout the evening. If you are a diabetic, please do not drink Gatorade since it is made with sugar. An alternative is Powerade Zero, which can be bought at the same locations as Gatorade.  Do not have any medications after 7 pm.   Do not eat or drink anything after midnight.      DAY OF YOUR CAPSULE ENDOSCOPY:    Wear loose fitting, two-piece outfit (example: jogging suit, sweat pants and top).      DURING YOUR CAPSULE ENDOSCOPY:    You have just swallowed a capsule endoscope. This sheet contains information about what to expect over the next 8 hours. Please call our office if you have severe or persistent abdominal or chest pain, fever, or difficulty swallowing.  Time of capsule ingestion: 8 am  You may drink clear liquids 4 hours after swallowing the capsule. You may eat a light meal 4 hours after swallowing the capsule. You may also take your medications at this time.  Do not exercise. Avoid heavy lifting. You may walk, sit and lay down. You can drive a car.  You may return to work, if your work allows avoiding unsuitable environments and/or physical movements.   Avoid going near MRI machines and radio transmitters. You may use a computer, radio, stereo or cell phone.                                    Do not stand directly next to another patient undergoing capsule endoscopy.  Try not to touch the recorder.  Avoid getting the data recorder wet.          You may loosen the belt to 
1
allow yourself to go to the bathroom. Do not take the belt off.  Observe the LED light on the data recorder at least every 15 minutes. If the light stops blinking blue, document the time and call our office.  Return to the office 3/26/2024 to return equipment.     AFTER YOUR CAPSULE ENDOSCOPY:    You have just had a capsule endoscopy. This sheet contains information about what to expect over the next two days. Please call our office if you have severe or persistent abdominal pain or chest pain, fever, or difficulty swallowing.  Pain is uncommon following capsule endoscopy. Should you feel sharp or persistent pain, please call our office.                               Nausea is also very uncommon and should it occur, please notify the office.                 You may eat and drink. There are no dietary restrictions.                                           Following the exam, you may resume normal activities, including exercise.  You may resume all medications immediately. Do not make up for doses you have missed, just begin your normal dosage.  Until the capsule passes, further testing which includes any type of MRI should be avoided. If you have an MRI with the Pill Cam still in your body, it can lead serious intestinal injury. If you have an MRI examination scheduled within the next 30 days, the testing should be postponed. The MRI can be done within 30 days only if you can positively confirm that the pill came out in your stool or a simple abdominal x-ray may be performed to confirm that the Pill Cam has left your body.  The capsule passes naturally in a bowel movement, typically in about 24 hours. Most likely, you will be unaware of its passage. It does not need to be retrieved and can safely be flushed down the toilet. Occasionally, the capsule lights will still be flashing when it passes. Should you be concerned that the capsule did not pass, in the absence of symptoms; an abdominal x-ray can be obtained after 
3 days to confirm its passage.    Please call your physician's office at [phone] if you have any further questions or if you need additional care.    Thank you for entrusting your care to [facility].  
1

## 2025-01-12 NOTE — DIETITIAN INITIAL EVALUATION ADULT - PERTINENT LABORATORY DATA
01-12    138  |  112[H]  |  57[H]  ----------------------------<  314[H]  5.1   |  12[L]  |  5.22[H]    Ca    6.9[L]      12 Jan 2025 12:08  Phos  6.9     01-12  Mg     2.2     01-12    TPro  5.6[L]  /  Alb  2.0[L]  /  TBili  0.4  /  DBili  x   /  AST  182[H]  /  ALT  142[H]  /  AlkPhos  101  01-12  POCT Blood Glucose.: 170 mg/dL (01-12-25 @ 10:40)  A1C with Estimated Average Glucose Result: 4.7 % (11-21-24 @ 06:40)

## 2025-01-12 NOTE — PROGRESS NOTE ADULT - SUBJECTIVE AND OBJECTIVE BOX
Patient is a 84y old  Female who presents with a chief complaint of Septic shock (11 Jan 2025 12:41)    INTERVAL HISTORY/ OVERNIGHT EVENTS:   Increased requirement of Levophed to 0.9, therefore the A-line was placed. No overnight events.     PRESSORS: [ x] YES [ ] NO  WHICH: Levophed     ANTIBIOTICS:                  DATE STARTED:  ANTIBIOTICS:                  DATE STARTED:  ANTIBIOTICS:                  DATE STARTED:    Antimicrobial:  piperacillin/tazobactam IVPB.. 3.375 Gram(s) IV Intermittent every 12 hours  vancomycin  IVPB 750 milliGRAM(s) IV Intermittent every 48 hours    Cardiovascular:  norepinephrine Infusion 1.2 MICROgram(s)/kG/Min IV Continuous <Continuous>    Pulmonary:    Hematalogic:  aspirin  chewable 81 milliGRAM(s) Oral daily  heparin   Injectable 5000 Unit(s) SubCutaneous every 8 hours    Other:  chlorhexidine 0.12% Liquid 15 milliLiter(s) Oral Mucosa every 12 hours  chlorhexidine 2% Cloths 1 Application(s) Topical <User Schedule>  fentaNYL   Infusion 0.5 MICROgram(s)/kG/Hr IV Continuous <Continuous>  hydrocortisone sodium succinate Injectable 50 milliGRAM(s) IV Push every 6 hours  levothyroxine Injectable 60 MICROGram(s) IV Push at bedtime  pantoprazole  Injectable 40 milliGRAM(s) IV Push daily  propofol Infusion 20 MICROgram(s)/kG/Min IV Continuous <Continuous>  sodium chloride 0.9% lock flush 10 milliLiter(s) IV Push every 1 hour PRN    aspirin  chewable 81 milliGRAM(s) Oral daily  chlorhexidine 0.12% Liquid 15 milliLiter(s) Oral Mucosa every 12 hours  chlorhexidine 2% Cloths 1 Application(s) Topical <User Schedule>  fentaNYL   Infusion 0.5 MICROgram(s)/kG/Hr IV Continuous <Continuous>  heparin   Injectable 5000 Unit(s) SubCutaneous every 8 hours  hydrocortisone sodium succinate Injectable 50 milliGRAM(s) IV Push every 6 hours  levothyroxine Injectable 60 MICROGram(s) IV Push at bedtime  norepinephrine Infusion 1.2 MICROgram(s)/kG/Min IV Continuous <Continuous>  pantoprazole  Injectable 40 milliGRAM(s) IV Push daily  piperacillin/tazobactam IVPB.. 3.375 Gram(s) IV Intermittent every 12 hours  propofol Infusion 20 MICROgram(s)/kG/Min IV Continuous <Continuous>  sodium chloride 0.9% lock flush 10 milliLiter(s) IV Push every 1 hour PRN  vancomycin  IVPB 750 milliGRAM(s) IV Intermittent every 48 hours    Drug Dosing Weight  Height (cm): 157.5 (20 Nov 2024 11:18)  Weight (kg): 67 (10 Raudel 2025 18:30)  BMI (kg/m2): 27 (10 Raudel 2025 18:30)  BSA (m2): 1.68 (10 Raudel 2025 18:30)    CENTRAL LINE: [ ] YES [ ] NO  LOCATION:     BAPTISTE: [ ] YES [ ] NO      A-LINE:  [ ] YES [ ] NO  LOCATION:         ICU Vital Signs Last 24 Hrs  T(C): 37.6 (12 Jan 2025 03:00), Max: 38.2 (11 Jan 2025 03:30)  T(F): 99.7 (12 Jan 2025 03:00), Max: 100.8 (11 Jan 2025 03:30)  HR: 80 (12 Jan 2025 03:00) (79 - 106)  BP: 123/44 (12 Jan 2025 00:00) (60/47 - 149/60)  BP(mean): 58 (12 Jan 2025 00:15) (52 - 85)  ABP: 128/52 (12 Jan 2025 03:00) (108/50 - 128/52)  ABP(mean): 78 (12 Jan 2025 03:00) (69 - 78)  RR: 16 (12 Jan 2025 03:00) (0 - 26)  SpO2: 98% (12 Jan 2025 03:00) (93% - 100%)        ABG - ( 12 Jan 2025 03:14 )  pH, Arterial: 7.15  pH, Blood: x     /  pCO2: 46    /  pO2: 109   / HCO3: 16    / Base Excess: -12.6 /  SaO2: 99                    01-10 @ 07:01  -  01-11 @ 07:00  --------------------------------------------------------  IN: 1140.2 mL / OUT: 5 mL / NET: 1135.2 mL        Mode: AC/ CMV (Assist Control/ Continuous Mandatory Ventilation)  RR (machine): 16  TV (machine): 350  FiO2: 30  PEEP: 5  ITime: 1  MAP: 6  PIP: 14      PHYSICAL EXAM:    GENERAL: Sedated,  elderly female  HEAD:  Atraumatic, Normocephalic  EYES:  conjunctiva and sclera clear  NECK: Supple, No JVD, Normal thyroid  CHEST/LUNG: Crackles bilaterally  HEART: tachycardic; No murmurs, rubs, or gallops  ABDOMEN: Soft, Nontender, Nondistended; Bowel sounds present  NERVOUS SYSTEM:  Alert & Oriented X0  EXTREMITIES:  2+ Peripheral Pulses, No clubbing, cyanosis, or edema  SKIN: warm dry    LABS:                        11.2   36.84 )-----------( 253      ( 11 Jan 2025 03:00 )             36.1     01-11    140  |  113[H]  |  39[H]  ----------------------------<  189[H]  4.2   |  16[L]  |  3.80[H]    Ca    7.6[L]      11 Jan 2025 03:00  Phos  4.1     01-11  Mg     1.6     01-11    TPro  6.2  /  Alb  2.5[L]  /  TBili  0.6  /  DBili  x   /  AST  343[H]  /  ALT  164[H]  /  AlkPhos  92  01-11    PT/INR - ( 10 Raudel 2025 12:35 )   PT: 17.6 sec;   INR: 1.51 ratio         PTT - ( 10 Raudel 2025 12:35 )  PTT:31.6 sec  Urinalysis Basic - ( 11 Jan 2025 03:00 )    Color: x / Appearance: x / SG: x / pH: x  Gluc: 189 mg/dL / Ketone: x  / Bili: x / Urobili: x   Blood: x / Protein: x / Nitrite: x   Leuk Esterase: x / RBC: x / WBC x   Sq Epi: x / Non Sq Epi: x / Bacteria: x      CAPILLARY BLOOD GLUCOSE      POCT Blood Glucose.: 151 mg/dL (11 Jan 2025 23:10)  POCT Blood Glucose.: 117 mg/dL (11 Jan 2025 16:49)    Culture Results:   <10,000 CFU/mL Normal Urogenital Anastasia (01-10 @ 15:47)  Culture Results:   Growth in aerobic and anaerobic bottles: Escherichia coli  Direct identification is available within approximately 3-5  hours either by Blood Panel Multiplexed PCR or Direct  MALDI-TOF. Details: [email or web address] (01-10 @ 12:40)  Culture Results:   Growth in aerobic and anaerobic bottles: Escherichia coli  See previous culture [id number] (01-10 @ 12:35)      RADIOLOGY & ADDITIONAL STUDIES REVIEWED:  ***     Patient is a 84y old  Female who presents with a chief complaint of Septic shock (11 Jan 2025 12:41)    INTERVAL HISTORY/ OVERNIGHT EVENTS: Increased requirement of Levophed to 0.9, therefore the A-line was placed. No overnight events.     PRESSORS: [ x] YES [ ] NO  WHICH: Levophed     ANTIBIOTICS:                  DATE STARTED:  ANTIBIOTICS:                  DATE STARTED:  ANTIBIOTICS:                  DATE STARTED:    Antimicrobial:  piperacillin/tazobactam IVPB.. 3.375 Gram(s) IV Intermittent every 12 hours  vancomycin  IVPB 750 milliGRAM(s) IV Intermittent every 48 hours    Cardiovascular:  norepinephrine Infusion 1.2 MICROgram(s)/kG/Min IV Continuous <Continuous>    Pulmonary:    Hematalogic:  aspirin  chewable 81 milliGRAM(s) Oral daily  heparin   Injectable 5000 Unit(s) SubCutaneous every 8 hours    Other:  chlorhexidine 0.12% Liquid 15 milliLiter(s) Oral Mucosa every 12 hours  chlorhexidine 2% Cloths 1 Application(s) Topical <User Schedule>  fentaNYL   Infusion 0.5 MICROgram(s)/kG/Hr IV Continuous <Continuous>  hydrocortisone sodium succinate Injectable 50 milliGRAM(s) IV Push every 6 hours  levothyroxine Injectable 60 MICROGram(s) IV Push at bedtime  pantoprazole  Injectable 40 milliGRAM(s) IV Push daily  propofol Infusion 20 MICROgram(s)/kG/Min IV Continuous <Continuous>  sodium chloride 0.9% lock flush 10 milliLiter(s) IV Push every 1 hour PRN    aspirin  chewable 81 milliGRAM(s) Oral daily  chlorhexidine 0.12% Liquid 15 milliLiter(s) Oral Mucosa every 12 hours  chlorhexidine 2% Cloths 1 Application(s) Topical <User Schedule>  fentaNYL   Infusion 0.5 MICROgram(s)/kG/Hr IV Continuous <Continuous>  heparin   Injectable 5000 Unit(s) SubCutaneous every 8 hours  hydrocortisone sodium succinate Injectable 50 milliGRAM(s) IV Push every 6 hours  levothyroxine Injectable 60 MICROGram(s) IV Push at bedtime  norepinephrine Infusion 1.2 MICROgram(s)/kG/Min IV Continuous <Continuous>  pantoprazole  Injectable 40 milliGRAM(s) IV Push daily  piperacillin/tazobactam IVPB.. 3.375 Gram(s) IV Intermittent every 12 hours  propofol Infusion 20 MICROgram(s)/kG/Min IV Continuous <Continuous>  sodium chloride 0.9% lock flush 10 milliLiter(s) IV Push every 1 hour PRN  vancomycin  IVPB 750 milliGRAM(s) IV Intermittent every 48 hours    Drug Dosing Weight  Height (cm): 157.5 (20 Nov 2024 11:18)  Weight (kg): 67 (10 Raudel 2025 18:30)  BMI (kg/m2): 27 (10 Raudel 2025 18:30)  BSA (m2): 1.68 (10 Raudel 2025 18:30)    CENTRAL LINE: [ ] YES [ ] NO  LOCATION:     BAPTISTE: [ ] YES [ ] NO      A-LINE:  [ ] YES [ ] NO  LOCATION:         ICU Vital Signs Last 24 Hrs  T(C): 37.6 (12 Jan 2025 03:00), Max: 38.2 (11 Jan 2025 03:30)  T(F): 99.7 (12 Jan 2025 03:00), Max: 100.8 (11 Jan 2025 03:30)  HR: 80 (12 Jan 2025 03:00) (79 - 106)  BP: 123/44 (12 Jan 2025 00:00) (60/47 - 149/60)  BP(mean): 58 (12 Jan 2025 00:15) (52 - 85)  ABP: 128/52 (12 Jan 2025 03:00) (108/50 - 128/52)  ABP(mean): 78 (12 Jan 2025 03:00) (69 - 78)  RR: 16 (12 Jan 2025 03:00) (0 - 26)  SpO2: 98% (12 Jan 2025 03:00) (93% - 100%)        ABG - ( 12 Jan 2025 03:14 )  pH, Arterial: 7.15  pH, Blood: x     /  pCO2: 46    /  pO2: 109   / HCO3: 16    / Base Excess: -12.6 /  SaO2: 99                    01-10 @ 07:01  -  01-11 @ 07:00  --------------------------------------------------------  IN: 1140.2 mL / OUT: 5 mL / NET: 1135.2 mL        Mode: AC/ CMV (Assist Control/ Continuous Mandatory Ventilation)  RR (machine): 16  TV (machine): 350  FiO2: 30  PEEP: 5  ITime: 1  MAP: 6  PIP: 14      PHYSICAL EXAM:    GENERAL: Sedated,  elderly female  HEAD:  Atraumatic, Normocephalic  EYES:  conjunctiva and sclera clear  NECK: Supple, No JVD, Normal thyroid  CHEST/LUNG: Crackles bilaterally  HEART: tachycardic; No murmurs, rubs, or gallops  ABDOMEN: Soft, Nontender, Nondistended; Bowel sounds present  NERVOUS SYSTEM:  Alert & Oriented X0  EXTREMITIES:  2+ Peripheral Pulses, No clubbing, cyanosis, or edema  SKIN: warm dry    LABS:                        11.2   36.84 )-----------( 253      ( 11 Jan 2025 03:00 )             36.1     01-11    140  |  113[H]  |  39[H]  ----------------------------<  189[H]  4.2   |  16[L]  |  3.80[H]    Ca    7.6[L]      11 Jan 2025 03:00  Phos  4.1     01-11  Mg     1.6     01-11    TPro  6.2  /  Alb  2.5[L]  /  TBili  0.6  /  DBili  x   /  AST  343[H]  /  ALT  164[H]  /  AlkPhos  92  01-11    PT/INR - ( 10 Raudel 2025 12:35 )   PT: 17.6 sec;   INR: 1.51 ratio         PTT - ( 10 Raudel 2025 12:35 )  PTT:31.6 sec  Urinalysis Basic - ( 11 Jan 2025 03:00 )    Color: x / Appearance: x / SG: x / pH: x  Gluc: 189 mg/dL / Ketone: x  / Bili: x / Urobili: x   Blood: x / Protein: x / Nitrite: x   Leuk Esterase: x / RBC: x / WBC x   Sq Epi: x / Non Sq Epi: x / Bacteria: x      CAPILLARY BLOOD GLUCOSE      POCT Blood Glucose.: 151 mg/dL (11 Jan 2025 23:10)  POCT Blood Glucose.: 117 mg/dL (11 Jan 2025 16:49)    Culture Results:   <10,000 CFU/mL Normal Urogenital Anastasia (01-10 @ 15:47)  Culture Results:   Growth in aerobic and anaerobic bottles: Escherichia coli  Direct identification is available within approximately 3-5  hours either by Blood Panel Multiplexed PCR or Direct  MALDI-TOF. Details: [email or web address] (01-10 @ 12:40)  Culture Results:   Growth in aerobic and anaerobic bottles: Escherichia coli  See previous culture [id number] (01-10 @ 12:35)      RADIOLOGY & ADDITIONAL STUDIES REVIEWED:  ***

## 2025-01-12 NOTE — PROGRESS NOTE ADULT - SUBJECTIVE AND OBJECTIVE BOX
Patient is a 84y Female with AKSHAT  AND  SEPSIS   SEEN  TODAY    CONT TO BE  ON  HIGH DOSE OF  PRESSORS    HAS A  LINE  IN     OVERALL  NOT  MUCH  DIFFERENT  SINCE YESTERDAY    No Known Allergies    Hospital Medications:   MEDICATIONS  (STANDING):  aspirin  chewable 81 milliGRAM(s) Oral daily  chlorhexidine 0.12% Liquid 15 milliLiter(s) Oral Mucosa every 12 hours  chlorhexidine 2% Cloths 1 Application(s) Topical <User Schedule>  fentaNYL   Infusion 0.5 MICROgram(s)/kG/Hr (3.35 mL/Hr) IV Continuous <Continuous>  heparin   Injectable 5000 Unit(s) SubCutaneous every 8 hours  hydrocortisone sodium succinate Injectable 50 milliGRAM(s) IV Push every 6 hours  levothyroxine Injectable 60 MICROGram(s) IV Push at bedtime  norepinephrine Infusion 1.2 MICROgram(s)/kG/Min (75.8 mL/Hr) IV Continuous <Continuous>  pantoprazole  Injectable 40 milliGRAM(s) IV Push daily  piperacillin/tazobactam IVPB.. 3.375 Gram(s) IV Intermittent every 12 hours  sodium bicarbonate 650 milliGRAM(s) Oral every 6 hours  vancomycin  IVPB 1000 milliGRAM(s) IV Intermittent every 48 hours    REVIEW OF SYSTEMS:  SYSTEM  REVIEW  CANT BE PERFORMED    PT ORALLY  INTUBATED    VITALS:  T(F): 99.7 (01-12-25 @ 12:45), Max: 100.2 (01-11-25 @ 15:15)  HR: 76 (01-12-25 @ 12:45)  BP: 116/53 (01-12-25 @ 12:00)  RR: 20 (01-12-25 @ 12:45)  SpO2: 99% (01-12-25 @ 12:45)  Wt(kg): --    01-11 @ 07:01  -  01-12 @ 07:00  --------------------------------------------------------  IN: 1698 mL / OUT: 60 mL / NET: 1638 mL    01-12 @ 07:01  - 01-12 @ 13:15  --------------------------------------------------------  IN: 215.9 mL / OUT: 20 mL / NET: 195.9 mL        PHYSICAL EXAM:  Constitutional: ORALLY  INTUBATED  HEENT CONJ  PINK  Neck: No JVD  Respiratory: CTAB, no wheezes, rales or rhonchi  Cardiovascular: S1, S2, RRR  Gastrointestinal: BS+, soft, NT/ND  Extremities:  No peripheral edema  Neurological:  ON  SEDATIVES  :  ambrocio.  IN  PLACE   URINE  OUPUT  LESS THAN   100CC LAST 24  HRS    LABS:  01-12    138  |  112[H]  |  57[H]  ----------------------------<  314[H]  5.1   |  12[L]  |  5.22[H]    Ca    6.9[L]      12 Jan 2025 12:08  Phos  6.9     01-12  Mg     2.2     01-12    TPro  5.6[L]  /  Alb  2.0[L]  /  TBili  0.4  /  DBili      /  AST  182[H]  /  ALT  142[H]  /  AlkPhos  101  01-12    Creatinine Trend: 5.22 <--, 4.75 <--, 3.80 <--, 3.48 <--, 3.33 <--                        10.8   37.56 )-----------( 189      ( 12 Jan 2025 03:30 )             36.1     Urine Studies:  Urinalysis Basic - ( 12 Jan 2025 12:08 )    Color:  / Appearance:  / SG:  / pH:   Gluc: 314 mg/dL / Ketone:   / Bili:  / Urobili:    Blood:  / Protein:  / Nitrite:    Leuk Esterase:  / RBC:  / WBC    Sq Epi:  / Non Sq Epi:  / Bacteria:         RADIOLOGY & ADDITIONAL STUDIES:

## 2025-01-12 NOTE — PROGRESS NOTE ADULT - SUBJECTIVE AND OBJECTIVE BOX
PATIENT SEEN AND EXAMINED BY ISELA LOPEZ M.D. ON :- 1/12/25  DATE OF SERVICE:    1/12/25         Interim events noted,Labs ,Radiological studies and Cardiology tests reviewed .    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 Not eating, feeling nauseous. He noticed yellow sputum with intermittent cough and excessive salivation. Denies any chest pain, shortness of breath, fevers, diarrhea, vomiting. Patient has constipation at baseline, Son and aid has similar symptoms at home. Patient unable to provide history.    In ED  pt on NRB> obtunded> ETT  s/p vanc and zosyn   Trop 25k  (10 Raudel 2025 17:14)      PAST MEDICAL & SURGICAL HISTORY:  Hypothyroid      Hyperlipidemia      Oral Cancer      Basal Cell Cancer  biopsy of nose  5/2010      Malignant neoplasm of mandible  left- 2008      Malignant neoplasm of tongue      Radiation therapy complication  burn to left neck area      History of radiation therapy  2008      Radiation dermatitis  left neck      Benign essential HTN      Polymyalgia rheumatica      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      Hardware complicating wound infection  S/P removal of hardware 6/2008      S/P biopsy  tongue lesion 7/2014      H/O right breast biopsy  1980's      History of cataract  left and right ; 6/2017, 12/2017          PREVIOUS DIAGNOSTIC TESTING:      ECHO  FINDINGS:    STRESS  FINDINGS:    CATHETERIZATION  FINDINGS:    MEDICATIONS  (STANDING):  aspirin  chewable 81 milliGRAM(s) Oral daily  chlorhexidine 0.12% Liquid 15 milliLiter(s) Oral Mucosa every 12 hours  chlorhexidine 2% Cloths 1 Application(s) Topical <User Schedule>  heparin   Injectable 5000 Unit(s) SubCutaneous every 8 hours  hydrocortisone sodium succinate Injectable 50 milliGRAM(s) IV Push every 6 hours  levothyroxine Injectable 60 MICROGram(s) IV Push at bedtime  norepinephrine Infusion 1.2 MICROgram(s)/kG/Min (75.8 mL/Hr) IV Continuous <Continuous>  pantoprazole  Injectable 40 milliGRAM(s) IV Push daily  piperacillin/tazobactam IVPB.. 3.375 Gram(s) IV Intermittent every 12 hours  propofol Infusion 20 MICROgram(s)/kG/Min (8.04 mL/Hr) IV Continuous <Continuous>  sodium bicarbonate 650 milliGRAM(s) Oral every 6 hours  vancomycin  IVPB 1000 milliGRAM(s) IV Intermittent every 48 hours    MEDICATIONS  (PRN):  sodium chloride 0.9% lock flush 10 milliLiter(s) IV Push every 1 hour PRN Pre/post blood products, medications, blood draw, and to maintain line patency      FAMILY HISTORY:  Family history of MI (myocardial infarction) (Father)        SOCIAL HISTORY:    CIGARETTES:    ALCOHOL:    REVIEW OF SYSTEMS:  CONSTITUTIONAL: No fever, weight loss, or fatigue  EYES: No eye pain, visual disturbances, or discharge  ENMT:  No difficulty hearing, tinnitus, vertigo; No sinus or throat pain  NECK: No pain or stiffness  RESPIRATORY: No cough, wheezing, chills or hemoptysis; No shortness of breath  CARDIOVASCULAR: No chest pain, palpitations, dizziness, or leg swelling  GASTROINTESTINAL: No abdominal or epigastric pain. No nausea, vomiting, or hematemesis; No diarrhea or constipation. No melena or hematochezia.  GENITOURINARY: No dysuria, frequency, hematuria, or incontinence  NEUROLOGICAL: No headaches, memory loss, loss of strength, numbness, or tremors  SKIN: No itching, burning, rashes, or lesions   LYMPH NODES: No enlarged glands  ENDOCRINE: No heat or cold intolerance; No hair loss  MUSCULOSKELETAL: No joint pain or swelling; No muscle, back, or extremity pain  PSYCHIATRIC: No depression, anxiety, mood swings, or difficulty sleeping  HEME/LYMPH: No easy bruising, or bleeding gums  ALLERY AND IMMUNOLOGIC: No hives or eczema    Vital Signs Last 24 Hrs  T(C): 37.7 (12 Jan 2025 21:30), Max: 37.9 (12 Jan 2025 01:30)  T(F): 99.9 (12 Jan 2025 21:30), Max: 100.2 (12 Jan 2025 01:30)  HR: 77 (12 Jan 2025 21:30) (64 - 88)  BP: 110/77 (12 Jan 2025 20:00) (110/77 - 132/57)  BP(mean): 87 (12 Jan 2025 20:00) (58 - 87)  RR: 21 (12 Jan 2025 21:30) (16 - 36)  SpO2: 99% (12 Jan 2025 21:30) (95% - 100%)          PHYSICAL EXAM:  GENERAL: NAD, well-groomed, well-developed  HEAD:  Atraumatic, Normocephalic  EYES: EOMI, PERRLA, conjunctiva and sclera clear  ENMT: No tonsillar erythema, exudates, or enlargement; Moist mucous membranes, Good dentition, No lesions  NECK: Supple, No JVD, Normal thyroid  NERVOUS SYSTEM:  Alert & Oriented X3, Good concentration; Motor Strength 5/5 B/L upper and lower extremities; DTRs 2+ intact and symmetric  CHEST/LUNG: Clear to percussion bilaterally; No rales, rhonchi, wheezing, or rubs  HEART: Regular rate and rhythm; No murmurs, rubs, or gallops  ABDOMEN: Soft, Nontender, Nondistended; Bowel sounds present  EXTREMITIES:  2+ Peripheral Pulses, No clubbing, cyanosis, or edema  LYMPH: No lymphadenopathy noted  SKIN: No rashes or lesions      INTERPRETATION OF TELEMETRY:    [facility]    [facility]     LABS:                        10.8   37.56 )-----------( 189      ( 12 Jan 2025 03:30 )             36.1     01-12    137  |  108  |  61[H]  ----------------------------<  192[H]  5.1   |  15[L]  |  5.42[H]    Ca    7.1[L]      12 Jan 2025 20:20  Phos  6.5     01-12  Mg     2.2     01-12    TPro  5.2[L]  /  Alb  1.9[L]  /  TBili  0.3  /  DBili  x   /  AST  166[H]  /  ALT  152[H]  /  AlkPhos  87  01-12          Urinalysis Basic - ( 12 Jan 2025 20:20 )    Color: x / Appearance: x / SG: x / pH: x  Gluc: 192 mg/dL / Ketone: x  / Bili: x / Urobili: x   Blood: x / Protein: x / Nitrite: x   Leuk Esterase: x / RBC: x / WBC x   Sq Epi: x / Non Sq Epi: x / Bacteria: x      Lipid Panel:   I&O's Summary    11 Jan 2025 07:01  -  12 Jan 2025 07:00  --------------------------------------------------------  IN: 1698 mL / OUT: 60 mL / NET: 1638 mL    12 Jan 2025 07:01  -  12 Jan 2025 22:39  --------------------------------------------------------  IN: 798.2 mL / OUT: 20 mL / NET: 778.2 mL        RADIOLOGY & ADDITIONAL STUDIES:    < from: TTE W or WO Ultrasound Enhancing Agent (01.11.25 @ 09:53) >  CONCLUSIONS:      1. Left ventricular cavity is normal in size. Left ventricular wall thickness is moderately increased. Left ventricular systolic function is normal with an ejection fraction visually estimated at 55 to 60 %.   2. There is moderate (grade 2) left ventricular diastolic dysfunction.   3. Normal right ventricular cavity size and normal right ventricular systolic function.   4. Left atrium is mildly dilated.   5. The right atrium is dilated.   6. Trileafletaortic valve. Mild aortic stenosis.   7. No evidence of aortic regurgitation.   8. Mild mitral valve leaflet calcification.   9. There is moderate posterior calcification of the mitral valve annulus.  10. No mitral valve stenosis.  11. Trace mitral regurgitation.  12. Mild pulmonary hypertension.  13. No pericardial effusion seen.  14. No prior echocardiogram is available for comparison.    < end of copied text >

## 2025-01-12 NOTE — DIETITIAN INITIAL EVALUATION ADULT - REASON FOR ADMISSION
Procedure(s):  LEFT KNEE ARTHROPLASTY TOTAL/**SDD**.    spinal    Anesthesia Post Evaluation      Multimodal analgesia: multimodal analgesia used between 6 hours prior to anesthesia start to PACU discharge  Patient location during evaluation: PACU  Patient participation: complete - patient participated  Level of consciousness: awake  Pain management: adequate  Airway patency: patent  Anesthetic complications: no  Cardiovascular status: acceptable  Respiratory status: acceptable  Hydration status: acceptable  Post anesthesia nausea and vomiting:  none      INITIAL Post-op Vital signs:   Vitals Value Taken Time   /71 11/18/21 1110   Temp 36.6 °C (97.8 °F) 11/18/21 1058   Pulse 79 11/18/21 1111   Resp 16 11/18/21 1100   SpO2 90 % 11/18/21 1111   Vitals shown include unvalidated device data.
Sepsis

## 2025-01-12 NOTE — PROGRESS NOTE ADULT - ASSESSMENT
A/P    PT  WITH  AKSHAT  SEC TO  SEPSIS  AND LOW  BP      HAS  ATN    CR  CONT  TO  RISE  AND  HAS  LOW  URINE OUTPUT   DIURETIC  CANT BE CONSIDERD  AT THIS  TIME  AS  HER  BP  IS  LOW  AND ON  HIGH  DOSE PRESSORS   WBC  CONT TO  BE  HIGH,  HAS BEEN ON ABX  FOR  48 HRS    OVERALL  PROGNOSIS  IS GUARDED      WILL FOLLOW

## 2025-01-12 NOTE — PROGRESS NOTE ADULT - ATTENDING COMMENTS
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, hypothyroidism, polymyalgia on prednisone, Presented to ED for altered mentation Admitted for septic shock with unknown origin                     ASSESSMENT     - Septic shock   - Bacteremia   - AMS / Encephalopathy   - Acute hypoxic resp failure   - NSTEMI   - MOSF   - AKSHAT   - Severe metabolic acidosis  - HTN HLD   - Throat / mandible ca   - Carotid stenosis   - Polymyalgia       Plan       - Sedated and pain meds   - CT head pend  -Vent support , adjust per ABG   - Hemodynamic support   - Shock resistant to ivf    - Vaso-active agent titrate to maintain MAP>65  - 2 DECHO noted   - Trend troponin due to sepsis   - Cards eval  Dr Orellana   - No beta- or ACE- due to shock   - Bicarb ivp 1 amp Q4h  - Stress dose steroid sine pat is on chronic prednisone   - Trend trop uptrend   - Broad spec antibx   - Repeat BC 1/13  - Monitor blood glucose with coverage   - Monitor LFT   - Trend Lactate remain elevated due to liver injury   - Montaño cath   - Monitor renal fx'  - Check urine lytes . US   - Neph eval   - DVT GI prophy.  - DNR

## 2025-01-13 NOTE — PROGRESS NOTE ADULT - ASSESSMENT
# oligo-anuric AKSHAT sec to septic shock. remains with high WBC and on pressors.  has metabolic acidosis.  D/W palliative  care team, PT's  family do not want dialysis and further discussion on ultimate goals of care ongoing.  continue current care in MICU

## 2025-01-13 NOTE — CONSULT NOTE ADULT - TIME BILLING
- Review of records, telemetry, vital signs and daily labs.   - General and cardiovascular physical examination.  - Generation of cardiovascular treatment plan and completion of note .  - Coordination of care.      Patient was seen and examined by me on  1/10/25,interim events noted,labs and radiology studies reviewed.  Anthony Orellana MD,FACC.  [address] [place], [place] [id number].  [phone]  Availabe to call or text on [employer] TE[employer].
This includes chart review, patient assessment, discussion and collaboration with interdisciplinary team members,excluding ACP.

## 2025-01-13 NOTE — CONSULT NOTE ADULT - CONVERSATION DETAILS
Met with the pt's family at the bedside, Introduced service and  Palliative medicines  team's role in assisting w complex decision making, communication and support.  Discussed her current clinical condition and further goals of care. DNR/DNI on file.  Patient is on multiple life supports and there is a concern that patient may not survive this admission even with intensive care support given. Patient's course complicated with septic shock w worsening renal function and family refusing HD.  Guarded prognosis, likely hours to days.  Confirmed DNR/DNI on file.     Discussed possible liberation of ETT.  Family inquired about the  process of palliative extubation. Explained process,  that patient will receive symptom management and family has option of being present.  Family agreed for liberation of ETT and comfort measures w IV medications for symptom management.   They wish to be present. Met with the pt's family at the bedside, Introduced service and  Palliative medicines  team's role in assisting w complex decision making, communication and support.  Discussed her current clinical condition and further goals of care. DNR/DNI on file.  Patient is on multiple life supports and there is concern that patient may not survive this admission even with intensive care;   family refusing HD.  Family explained the pt never wanted to be intubated or have HD. Explained pt is appropriate for hospice. Discussed hospice philosophy/comfort measures.   Confirmed DNR/DNI on file.   Explained given continuing ventilation is no longer consistent with the family's goals, liberation of the ETT is appropriate.  It will allow the pt a natural death while ensuring the pt's comfort and dignity.  Family stated they are in agreement for liberation of the ET tube with IV medications for symptom management.    They wish to be present.  New MOLST drafted: DNR/DNI/no HD/comfort measures only.  All questions answered.  Support provided.  d/w ICU team

## 2025-01-13 NOTE — PROGRESS NOTE ADULT - SUBJECTIVE AND OBJECTIVE BOX
PATIENT SEEN AND EXAMINED BY ISELA LOPEZ M.D. ON :- 1/13/25  DATE OF SERVICE:  1/13/25           Interim events noted,Labs ,Radiological studies and Cardiology tests reviewed .    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 Not eating, feeling nauseous. He noticed yellow sputum with intermittent cough and excessive salivation. Denies any chest pain, shortness of breath, fevers, diarrhea, vomiting. Patient has constipation at baseline, Son and aid has similar symptoms at home. Patient unable to provide history.    In ED  pt on NRB> obtunded> ETT  s/p vanc and zosyn   Trop 25k  (10 Raudel 2025 17:14)      PAST MEDICAL & SURGICAL HISTORY:  Hypothyroid      Hyperlipidemia      Oral Cancer      Basal Cell Cancer  biopsy of nose  5/2010      Malignant neoplasm of mandible  left- 2008      Malignant neoplasm of tongue      Radiation therapy complication  burn to left neck area      History of radiation therapy  2008      Radiation dermatitis  left neck      Benign essential HTN      Polymyalgia rheumatica      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      Hardware complicating wound infection  S/P removal of hardware 6/2008      S/P biopsy  tongue lesion 7/2014      H/O right breast biopsy  1980's      History of cataract  left and right ; 6/2017, 12/2017          PREVIOUS DIAGNOSTIC TESTING:      ECHO  FINDINGS:    STRESS  FINDINGS:    CATHETERIZATION  FINDINGS:    MEDICATIONS  (STANDING):  cefTRIAXone   IVPB 2000 milliGRAM(s) IV Intermittent every 24 hours  chlorhexidine 2% Cloths 1 Application(s) Topical <User Schedule>  fentaNYL   Infusion 0.5 MICROgram(s)/kG/Hr (3.35 mL/Hr) IV Continuous <Continuous>    MEDICATIONS  (PRN):  glycopyrrolate Injectable 0.4 milliGRAM(s) IV Push every 4 hours PRN secretions  HYDROmorphone  Injectable 0.5 milliGRAM(s) IV Push every 1 hour PRN Moderate Pain (4 - 6)  LORazepam   Injectable 2 milliGRAM(s) IV Push every 2 hours PRN respiratory distress  sodium chloride 0.9% lock flush 10 milliLiter(s) IV Push every 1 hour PRN Pre/post blood products, medications, blood draw, and to maintain line patency      FAMILY HISTORY:  Family history of MI (myocardial infarction) (Father)        SOCIAL HISTORY:    CIGARETTES:    ALCOHOL:    REVIEW OF SYSTEMS:  CONSTITUTIONAL: No fever, weight loss, or fatigue  EYES: No eye pain, visual disturbances, or discharge  ENMT:  No difficulty hearing, tinnitus, vertigo; No sinus or throat pain  NECK: No pain or stiffness  RESPIRATORY: No cough, wheezing, chills or hemoptysis; No shortness of breath  CARDIOVASCULAR: No chest pain, palpitations, dizziness, or leg swelling  GASTROINTESTINAL: No abdominal or epigastric pain. No nausea, vomiting, or hematemesis; No diarrhea or constipation. No melena or hematochezia.  GENITOURINARY: No dysuria, frequency, hematuria, or incontinence  NEUROLOGICAL: No headaches, memory loss, loss of strength, numbness, or tremors  SKIN: No itching, burning, rashes, or lesions   LYMPH NODES: No enlarged glands  ENDOCRINE: No heat or cold intolerance; No hair loss  MUSCULOSKELETAL: No joint pain or swelling; No muscle, back, or extremity pain  PSYCHIATRIC: No depression, anxiety, mood swings, or difficulty sleeping  HEME/LYMPH: No easy bruising, or bleeding gums  ALLERY AND IMMUNOLOGIC: No hives or eczema    Vital Signs Last 24 Hrs  T(C): 36.7 (13 Jan 2025 19:00), Max: 37.8 (12 Jan 2025 23:30)  T(F): 98.1 (13 Jan 2025 19:00), Max: 100 (12 Jan 2025 23:30)  HR: 0 (13 Jan 2025 22:45) (0 - 98)  BP: 110/67 (13 Jan 2025 16:30) (95/63 - 143/72)  BP(mean): 80 (13 Jan 2025 16:30) (71 - 92)  RR: 0 (13 Jan 2025 22:45) (0 - 29)  SpO2: 63% (13 Jan 2025 19:00) (63% - 100%)          PHYSICAL EXAM:  GENERAL: NAD, well-groomed, well-developed  HEAD:  Atraumatic, Normocephalic  EYES: EOMI, PERRLA, conjunctiva and sclera clear  ENMT: No tonsillar erythema, exudates, or enlargement; Moist mucous membranes, Good dentition, No lesions  NECK: Supple, No JVD, Normal thyroid  NERVOUS SYSTEM:  Alert & Oriented X3, Good concentration; Motor Strength 5/5 B/L upper and lower extremities; DTRs 2+ intact and symmetric  CHEST/LUNG: Clear to percussion bilaterally; No rales, rhonchi, wheezing, or rubs  HEART: Regular rate and rhythm; No murmurs, rubs, or gallops  ABDOMEN: Soft, Nontender, Nondistended; Bowel sounds present  EXTREMITIES:  2+ Peripheral Pulses, No clubbing, cyanosis, or edema  LYMPH: No lymphadenopathy noted  SKIN: No rashes or lesions      INTERPRETATION OF TELEMETRY:    ECG:    [facility]     LABS:                        10.0   28.26 )-----------( 103      ( 13 Jan 2025 03:22 )             32.1     01-13    137  |  110[H]  |  74[H]  ----------------------------<  189[H]  5.6[H]   |  14[L]  |  5.80[H]    Ca    7.0[L]      13 Jan 2025 12:42  Phos  6.3     01-13  Mg     2.4     01-13    TPro  5.5[L]  /  Alb  1.9[L]  /  TBili  0.4  /  DBili  x   /  AST  151[H]  /  ALT  160[H]  /  AlkPhos  94  01-13          Urinalysis Basic - ( 13 Jan 2025 12:42 )    Color: x / Appearance: x / SG: x / pH: x  Gluc: 189 mg/dL / Ketone: x  / Bili: x / Urobili: x   Blood: x / Protein: x / Nitrite: x   Leuk Esterase: x / RBC: x / WBC x   Sq Epi: x / Non Sq Epi: x / Bacteria: x      Lipid Panel:   I&O's Summary    12 Jan 2025 07:01  -  13 Jan 2025 07:00  --------------------------------------------------------  IN: 1030.8 mL / OUT: 45 mL / NET: 985.8 mL    13 Jan 2025 07:01  -  13 Jan 2025 22:52  --------------------------------------------------------  IN: 88.7 mL / OUT: 0 mL / NET: 88.7 mL        RADIOLOGY & ADDITIONAL STUDIES:    < from: TTE W or WO Ultrasound Enhancing Agent (01.11.25 @ 09:53) >  CONCLUSIONS:      1. Left ventricular cavity is normal in size. Left ventricular wall thickness is moderately increased. Left ventricular systolic function is normal with an ejection fraction visually estimated at 55 to 60 %.   2. There is moderate (grade 2) left ventricular diastolic dysfunction.   3. Normal right ventricular cavity size and normal right ventricular systolic function.   4. Left atrium is mildly dilated.   5. The right atrium is dilated.   6. Trileafletaortic valve. Mild aortic stenosis.   7. No evidence of aortic regurgitation.   8. Mild mitral valve leaflet calcification.   9. There is moderate posterior calcification of the mitral valve annulus.  10. No mitral valve stenosis.  11. Trace mitral regurgitation.  12. Mild pulmonary hypertension.  13. No pericardial effusion seen.  14. No prior echocardiogram is available for comparison.    < end of copied text >

## 2025-01-13 NOTE — DISCHARGE NOTE FOR THE EXPIRED PATIENT - HOSPITAL COURSE
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 Intubated in ED. Admitted to ICU for septic shock with unknown origin. Troponins peaked at 50k before down-trending. EKG no ischemic changes. Pt required pressors with Levophed. UA (+) for UTI. BCx grew Gram (-) rods E. Coli. Started Ceftriaxone. Hospital course complicated by worsening renal function. Family refusing HD. Family explained the pt never wanted to be intubated or have HD. Palliative care consulted, pt transitioned to comfort care. Palliatively extubated 1/13.   Notified by RN that patient was found to have no HR tracing on telemetry. Patient examined bedside- unable to appreciate heart sounds, breath sounds on auscultation. Brainstem reflexes including corneal, gag absent. Patient was pronounced to have passed at 22:44 on 1/ 13/24 . Family ( bedside) was informed, condolences offered.

## 2025-01-13 NOTE — PROGRESS NOTE ADULT - NUTRITIONAL ASSESSMENT
< from: CT Head No Cont (01.10.25 @ 17:58) >    IMPRESSION: Age-appropriate involutional and ischemic gliotic changes. No   hemorrhage. 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    --- End of Report ---    < end of copied text >
< from: CT Head No Cont (01.10.25 @ 17:58) >    IMPRESSION: Age-appropriate involutional and ischemic gliotic changes. No   hemorrhage. 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    --- End of Report ---    < end of copied text >

## 2025-01-13 NOTE — CONSULT NOTE ADULT - PROBLEM SELECTOR RECOMMENDATION 9
intubated/sedated on pressor support for airway protection.    Family agreed for liberation of the ETT.    -Dilaudid 0.5 mg iVP q4 prn for dyspnea  -Dilaudid 1 mg IVP q1 prn for breakthrough SOB]  -Ativan 1 mg IVP q4 prn for agitation  -Glycopyrrolate 0.4 mg IVP q4 prn for secretions  -Bowel regimen to avoid opioid induced constipation  -Mouthcare  -CMO intubated/sedated on pressor support for airway protection.    Family agreed for liberation of the ETT.    -Dilaudid 0.5 mg IVP q4 prn for dyspnea  -Dilaudid 1 mg IVP q1 prn for breakthrough SOB]  -Ativan 1 mg IVP q4 prn for agitation  -Glycopyrrolate 0.4 mg IVP q4 prn for secretions  -Bowel regimen to avoid opioid induced constipation  -Mouthcare  -CMO

## 2025-01-13 NOTE — PROGRESS NOTE ADULT - TIME BILLING
- Review of records, telemetry, vital signs and daily labs.   - General and cardiovascular physical examination.  - Generation of cardiovascular treatment plan and completion of note .  - Coordination of care.      Patient was seen and examined by me on  1/13/25,interim events noted,labs and radiology studies reviewed.  Anthony Orellana MD,FACC.  [address] [place], [place] [id number].  [phone]  Availabe to call or text on [employer] TEAMS.
- Review of records, telemetry, vital signs and daily labs.   - General and cardiovascular physical examination.  - Generation of cardiovascular treatment plan and completion of note .  - Coordination of care.      Patient was seen and examined by me on  1/12/25,interim events noted,labs and radiology studies reviewed.  Anthony Orellana MD,FACC.  [address] [place], [place] [id number].  [phone]  Availabe to call or text on [employer] TEAMS.
- Review of records, telemetry, vital signs and daily labs.   - General and cardiovascular physical examination.  - Generation of cardiovascular treatment plan and completion of note .  - Coordination of care.      Patient was seen and examined by me on  1/11/25,interim events noted,labs and radiology studies reviewed.  Anthony Orellana MD,FACC.  [address] [place], [place] [id number].  [phone]  Availabe to call or text on [employer] TEAMS.

## 2025-01-13 NOTE — PROGRESS NOTE ADULT - SUBJECTIVE AND OBJECTIVE BOX
INTERVAL HPI/OVERNIGHT EVENTS:       PRESSORS: [ ] YES [ ] NO  WHICH:    ANTIBIOTICS:                  DATE STARTED:  ANTIBIOTICS:                  DATE STARTED:    Antimicrobial:  cefTRIAXone   IVPB 2000 milliGRAM(s) IV Intermittent every 24 hours    Cardiovascular:  norepinephrine Infusion 1.2 MICROgram(s)/kG/Min IV Continuous <Continuous>    Pulmonary:    Hematalogic:  aspirin  chewable 81 milliGRAM(s) Oral daily  heparin   Injectable 5000 Unit(s) SubCutaneous every 8 hours    Other:  chlorhexidine 0.12% Liquid 15 milliLiter(s) Oral Mucosa every 12 hours  chlorhexidine 2% Cloths 1 Application(s) Topical <User Schedule>  fentaNYL   Infusion 0.5 MICROgram(s)/kG/Hr IV Continuous <Continuous>  hydrocortisone sodium succinate Injectable 50 milliGRAM(s) IV Push every 6 hours  levothyroxine Injectable 60 MICROGram(s) IV Push at bedtime  pantoprazole  Injectable 40 milliGRAM(s) IV Push daily  sodium bicarbonate 650 milliGRAM(s) Oral every 6 hours  sodium chloride 0.9% lock flush 10 milliLiter(s) IV Push every 1 hour PRN    aspirin  chewable 81 milliGRAM(s) Oral daily  cefTRIAXone   IVPB 2000 milliGRAM(s) IV Intermittent every 24 hours  chlorhexidine 0.12% Liquid 15 milliLiter(s) Oral Mucosa every 12 hours  chlorhexidine 2% Cloths 1 Application(s) Topical <User Schedule>  fentaNYL   Infusion 0.5 MICROgram(s)/kG/Hr IV Continuous <Continuous>  heparin   Injectable 5000 Unit(s) SubCutaneous every 8 hours  hydrocortisone sodium succinate Injectable 50 milliGRAM(s) IV Push every 6 hours  levothyroxine Injectable 60 MICROGram(s) IV Push at bedtime  norepinephrine Infusion 1.2 MICROgram(s)/kG/Min IV Continuous <Continuous>  pantoprazole  Injectable 40 milliGRAM(s) IV Push daily  sodium bicarbonate 650 milliGRAM(s) Oral every 6 hours  sodium chloride 0.9% lock flush 10 milliLiter(s) IV Push every 1 hour PRN    Drug Dosing Weight  Height (cm): 157.5 (20 Nov 2024 11:18)  Weight (kg): 67 (10 Raudel 2025 18:30)  BMI (kg/m2): 27 (10 Raudel 2025 18:30)  BSA (m2): 1.68 (10 Raudel 2025 18:30)    PHYSICAL EXAM:  GENERAL: NAD  EYES: EOMI, PERRLA  NECK: Supple, No JVD; Normal thyroid; Trachea midline: No LAD   NERVOUS SYSTEM:  Alert & Oriented X3,  Motor Strength 5/5 B/L upper and lower extremities; DTRs 2+ intact and symmetric  CHEST/LUNG: No rales, rhonchi, wheezing, breath sounds present bilaterally  HEART: Regular rate and rhythm; No murmurs, no gallops  ABDOMEN: Soft, Nontender, Nondistended; Bowel sounds present, no pain or masses on palpation  : voiding well, Baptiste in place  EXTREMITIES:  2+ Peripheral Pulses, No clubbing, cyanosis, or edema  SKIN: warm, intact, no lesions     LINES/DRAINS/DEVICES  CENTRAL LINE: [ ] YES [ ] NO  LOCATION:     BAPTISTE: [ ] YES [ ] NO     A-LINE:  [ ] YES [ ] NO  LOCATION:       ICU Vital Signs Last 24 Hrs  T(C): 37.1 (13 Jan 2025 10:45), Max: 37.8 (12 Jan 2025 13:45)  T(F): 98.8 (13 Jan 2025 10:45), Max: 100 (12 Jan 2025 13:45)  HR: 66 (13 Jan 2025 10:45) (62 - 79)  BP: 98/66 (13 Jan 2025 10:45) (98/66 - 143/72)  BP(mean): 76 (13 Jan 2025 10:45) (72 - 92)  ABP: 103/90 (13 Jan 2025 07:00) (94/64 - 142/65)  ABP(mean): 97 (13 Jan 2025 07:00) (69 - 128)  RR: 20 (13 Jan 2025 10:45) (13 - 36)  SpO2: 98% (13 Jan 2025 10:45) (98% - 100%)        ABG - ( 13 Jan 2025 03:21 )  pH, Arterial: 7.26  pH, Blood: x     /  pCO2: 27    /  pO2: 125   / HCO3: 12    / Base Excess: -13.4 /  SaO2: 99                    01-12 @ 07:01  -  01-13 @ 07:00  --------------------------------------------------------  IN: 1030.8 mL / OUT: 45 mL / NET: 985.8 mL        Mode: AC/ CMV (Assist Control/ Continuous Mandatory Ventilation)  RR (machine): 20  TV (machine): 350  FiO2: 30  PEEP: 5  ITime: 1  MAP: 7  PIP: 14        LABS:  CBC Full  -  ( 13 Jan 2025 03:22 )  WBC Count : 28.26 K/uL  RBC Count : 4.13 M/uL  Hemoglobin : 10.0 g/dL  Hematocrit : 32.1 %  Platelet Count - Automated : 103 K/uL  Mean Cell Volume : 77.7 fl  Mean Cell Hemoglobin : 24.2 pg  Mean Cell Hemoglobin Concentration : 31.2 g/dL  Auto Neutrophil # : 26.85 K/uL  Auto Lymphocyte # : 0.85 K/uL  Auto Monocyte # : 0.28 K/uL  Auto Eosinophil # : 0.00 K/uL  Auto Basophil # : 0.00 K/uL  Auto Neutrophil % : 95.0 %  Auto Lymphocyte % : 3.0 %  Auto Monocyte % : 1.0 %  Auto Eosinophil % : 0.0 %  Auto Basophil % : 0.0 %    01-13    135  |  108  |  63[H]  ----------------------------<  171[H]  5.7[H]   |  15[L]  |  5.60[H]    Ca    7.3[L]      13 Jan 2025 03:22  Phos  6.4     01-13  Mg     2.3     01-13    TPro  5.5[L]  /  Alb  1.9[L]  /  TBili  0.4  /  DBili  x   /  AST  151[H]  /  ALT  160[H]  /  AlkPhos  94  01-13      Urinalysis Basic - ( 13 Jan 2025 03:22 )    Color: x / Appearance: x / SG: x / pH: x  Gluc: 171 mg/dL / Ketone: x  / Bili: x / Urobili: x   Blood: x / Protein: x / Nitrite: x   Leuk Esterase: x / RBC: x / WBC x   Sq Epi: x / Non Sq Epi: x / Bacteria: x      Culture Results:   Commensal tony consistent with body site (01-11 @ 12:30)  Culture Results:   <10,000 CFU/mL Normal Urogenital Tony (01-10 @ 15:47)  Culture Results:   Growth in aerobic and anaerobic bottles: Escherichia coli  Direct identification is available within approximately 3-5  hours either by Blood Panel Multiplexed PCR or Direct  MALDI-TOF. Details: [email or web address] (01-10 @ 12:40)  Culture Results:   Growth in aerobic and anaerobic bottles: Escherichia coli  See previous culture [id number] (01-10 @ 12:35)      RADIOLOGY & ADDITIONAL STUDIES REVIEWED DURING TEAM ROUNDS    [ ]GOALS OF CARE DISCUSSION WITH PATIENT/FAMILY/PROXY:    CRITICAL CARE TIME SPENT: 35 minutes   INTERVAL HPI/OVERNIGHT EVENTS: Pt examined at bedside this am. No overnight events. Afebrile. Intubated and sedated. Requiring pressors. Palliative care to evaluate pt.       PRESSORS: [X] YES [ ] NO  WHICH: Levophed    ANTIBIOTICS:                  DATE STARTED:  ANTIBIOTICS:                  DATE STARTED:    Antimicrobial:  cefTRIAXone   IVPB 2000 milliGRAM(s) IV Intermittent every 24 hours    Cardiovascular:  norepinephrine Infusion 1.2 MICROgram(s)/kG/Min IV Continuous <Continuous>    Pulmonary:    Hematalogic:  aspirin  chewable 81 milliGRAM(s) Oral daily  heparin   Injectable 5000 Unit(s) SubCutaneous every 8 hours    Other:  chlorhexidine 0.12% Liquid 15 milliLiter(s) Oral Mucosa every 12 hours  chlorhexidine 2% Cloths 1 Application(s) Topical <User Schedule>  fentaNYL   Infusion 0.5 MICROgram(s)/kG/Hr IV Continuous <Continuous>  hydrocortisone sodium succinate Injectable 50 milliGRAM(s) IV Push every 6 hours  levothyroxine Injectable 60 MICROGram(s) IV Push at bedtime  pantoprazole  Injectable 40 milliGRAM(s) IV Push daily  sodium bicarbonate 650 milliGRAM(s) Oral every 6 hours  sodium chloride 0.9% lock flush 10 milliLiter(s) IV Push every 1 hour PRN    aspirin  chewable 81 milliGRAM(s) Oral daily  cefTRIAXone   IVPB 2000 milliGRAM(s) IV Intermittent every 24 hours  chlorhexidine 0.12% Liquid 15 milliLiter(s) Oral Mucosa every 12 hours  chlorhexidine 2% Cloths 1 Application(s) Topical <User Schedule>  fentaNYL   Infusion 0.5 MICROgram(s)/kG/Hr IV Continuous <Continuous>  heparin   Injectable 5000 Unit(s) SubCutaneous every 8 hours  hydrocortisone sodium succinate Injectable 50 milliGRAM(s) IV Push every 6 hours  levothyroxine Injectable 60 MICROGram(s) IV Push at bedtime  norepinephrine Infusion 1.2 MICROgram(s)/kG/Min IV Continuous <Continuous>  pantoprazole  Injectable 40 milliGRAM(s) IV Push daily  sodium bicarbonate 650 milliGRAM(s) Oral every 6 hours  sodium chloride 0.9% lock flush 10 milliLiter(s) IV Push every 1 hour PRN    Drug Dosing Weight  Height (cm): 157.5 (20 Nov 2024 11:18)  Weight (kg): 67 (10 Raudel 2025 18:30)  BMI (kg/m2): 27 (10 Raudel 2025 18:30)  BSA (m2): 1.68 (10 Raudel 2025 18:30)    PHYSICAL EXAM:  GENERAL: Sedated,  elderly female  HEAD:  Atraumatic, Normocephalic  EYES:  conjunctiva and sclera clear  NECK: Supple, No JVD, Normal thyroid  CHEST/LUNG: Crackles bilaterally  HEART: tachycardic; No murmurs, rubs, or gallops  ABDOMEN: Soft, Nontender, Nondistended; Bowel sounds present  NERVOUS SYSTEM:  Alert & Oriented X0  EXTREMITIES:  2+ Peripheral Pulses, No clubbing, cyanosis, or edema  SKIN: warm dry    LINES/DRAINS/DEVICES  CENTRAL LINE: [X] YES [ ] NO  LOCATION:     BAPTISTE: [x] YES [ ] NO     A-LINE:  [X] YES [] NO  LOCATION:       ICU Vital Signs Last 24 Hrs  T(C): 37.1 (13 Jan 2025 10:45), Max: 37.8 (12 Jan 2025 13:45)  T(F): 98.8 (13 Jan 2025 10:45), Max: 100 (12 Jan 2025 13:45)  HR: 66 (13 Jan 2025 10:45) (62 - 79)  BP: 98/66 (13 Jan 2025 10:45) (98/66 - 143/72)  BP(mean): 76 (13 Jan 2025 10:45) (72 - 92)  ABP: 103/90 (13 Jan 2025 07:00) (94/64 - 142/65)  ABP(mean): 97 (13 Jan 2025 07:00) (69 - 128)  RR: 20 (13 Jan 2025 10:45) (13 - 36)  SpO2: 98% (13 Jan 2025 10:45) (98% - 100%)        ABG - ( 13 Jan 2025 03:21 )  pH, Arterial: 7.26  pH, Blood: x     /  pCO2: 27    /  pO2: 125   / HCO3: 12    / Base Excess: -13.4 /  SaO2: 99                    01-12 @ 07:01  -  01-13 @ 07:00  --------------------------------------------------------  IN: 1030.8 mL / OUT: 45 mL / NET: 985.8 mL        Mode: AC/ CMV (Assist Control/ Continuous Mandatory Ventilation)  RR (machine): 20  TV (machine): 350  FiO2: 30  PEEP: 5  ITime: 1  MAP: 7  PIP: 14        LABS:  CBC Full  -  ( 13 Jan 2025 03:22 )  WBC Count : 28.26 K/uL  RBC Count : 4.13 M/uL  Hemoglobin : 10.0 g/dL  Hematocrit : 32.1 %  Platelet Count - Automated : 103 K/uL  Mean Cell Volume : 77.7 fl  Mean Cell Hemoglobin : 24.2 pg  Mean Cell Hemoglobin Concentration : 31.2 g/dL  Auto Neutrophil # : 26.85 K/uL  Auto Lymphocyte # : 0.85 K/uL  Auto Monocyte # : 0.28 K/uL  Auto Eosinophil # : 0.00 K/uL  Auto Basophil # : 0.00 K/uL  Auto Neutrophil % : 95.0 %  Auto Lymphocyte % : 3.0 %  Auto Monocyte % : 1.0 %  Auto Eosinophil % : 0.0 %  Auto Basophil % : 0.0 %    01-13    135  |  108  |  63[H]  ----------------------------<  171[H]  5.7[H]   |  15[L]  |  5.60[H]    Ca    7.3[L]      13 Jan 2025 03:22  Phos  6.4     01-13  Mg     2.3     01-13    TPro  5.5[L]  /  Alb  1.9[L]  /  TBili  0.4  /  DBili  x   /  AST  151[H]  /  ALT  160[H]  /  AlkPhos  94  01-13      Urinalysis Basic - ( 13 Jan 2025 03:22 )    Color: x / Appearance: x / SG: x / pH: x  Gluc: 171 mg/dL / Ketone: x  / Bili: x / Urobili: x   Blood: x / Protein: x / Nitrite: x   Leuk Esterase: x / RBC: x / WBC x   Sq Epi: x / Non Sq Epi: x / Bacteria: x      Culture Results:   Commensal tony consistent with body site (01-11 @ 12:30)  Culture Results:   <10,000 CFU/mL Normal Urogenital Tony (01-10 @ 15:47)  Culture Results:   Growth in aerobic and anaerobic bottles: Escherichia coli  Direct identification is available within approximately 3-5  hours either by Blood Panel Multiplexed PCR or Direct  MALDI-TOF. Details: [email or web address] (01-10 @ 12:40)  Culture Results:   Growth in aerobic and anaerobic bottles: Escherichia coli  See previous culture [id number] (01-10 @ 12:35)      RADIOLOGY & ADDITIONAL STUDIES REVIEWED DURING TEAM ROUNDS    [ ]GOALS OF CARE DISCUSSION WITH PATIENT/FAMILY/PROXY:    CRITICAL CARE TIME SPENT: 35 minutes

## 2025-01-13 NOTE — CONSULT NOTE ADULT - SUBJECTIVE AND OBJECTIVE BOX
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 Not eating, feeling nauseous. He noticed yellow sputum with intermittent cough and excessive salivation. Denies any chest pain, shortness of breath, fevers, diarrhea, vomiting. Patient has constipation at baseline, Son and aid has similar symptoms at home. Patient unable to provide history.    In ED  pt on NRB> obtunded> ETT  s/p vanc and zosyn   Trop 25k  (10 Raudel 2025 17:14)      PAST MEDICAL & SURGICAL HISTORY:  Hypothyroid      Hyperlipidemia      Oral Cancer      Basal Cell Cancer  biopsy of nose  5/2010      Malignant neoplasm of mandible  left- 2008      Malignant neoplasm of tongue      Radiation therapy complication  burn to left neck area      History of radiation therapy  2008      Radiation dermatitis  left neck      Benign essential HTN      Polymyalgia rheumatica      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      Hardware complicating wound infection  S/P removal of hardware 6/2008      S/P biopsy  tongue lesion 7/2014      H/O right breast biopsy  1980's      History of cataract  left and right ; 6/2017, 12/2017          SOCIAL HISTORY:    Admitted from:  home  (with HHA)           assisted living          [facility]       LTC   [ none ] Substance abuse, [ none ] Tobacco hx, [ none ] Alcohol hx, [ none ] Home Opioid Hx    FAMILY HISTORY:  Family history of MI (myocardial infarction) (Father)     unable to obtain from patient due to poor mentation, family unable to give information, see H&P for history  Baseline ADLs (prior to admission):    Allergies    No Known Allergies    Intolerances      Present Symptoms: Mild, Moderate, Severe  Pain:             Location -                               Aggravating factors -             Quality -             Radiation -             Timing-             Severity (0-10 scale):             Minimal acceptable level (0-10 scale):  Fatigue:  Nausea:  Lack of Appetite:   SOB:  Depression:  Anxiety:  Review of Systems: [All others negative or Unable to obtain due to poor mentation]    CPOT:    https://www.[facility].org/getattachment/[email or web address]/Critical-Care-Pain-Observation-Tool-(CPOT)  PAIN AD Score:   http://geriatrictoolkit.[facility]/cog/painad.pdf (press ctrl +  left click to view)      MEDICATIONS  (STANDING):  aspirin  chewable 81 milliGRAM(s) Oral daily  cefTRIAXone   IVPB 2000 milliGRAM(s) IV Intermittent every 24 hours  chlorhexidine 0.12% Liquid 15 milliLiter(s) Oral Mucosa every 12 hours  chlorhexidine 2% Cloths 1 Application(s) Topical <User Schedule>  fentaNYL   Infusion 0.5 MICROgram(s)/kG/Hr (3.35 mL/Hr) IV Continuous <Continuous>  heparin   Injectable 5000 Unit(s) SubCutaneous every 8 hours  hydrocortisone sodium succinate Injectable 50 milliGRAM(s) IV Push every 6 hours  levothyroxine Injectable 60 MICROGram(s) IV Push at bedtime  norepinephrine Infusion 1.2 MICROgram(s)/kG/Min (75.8 mL/Hr) IV Continuous <Continuous>  pantoprazole  Injectable 40 milliGRAM(s) IV Push daily  sodium bicarbonate 650 milliGRAM(s) Oral every 6 hours    MEDICATIONS  (PRN):  sodium chloride 0.9% lock flush 10 milliLiter(s) IV Push every 1 hour PRN Pre/post blood products, medications, blood draw, and to maintain line patency      PHYSICAL EXAM:  Vital Signs Last 24 Hrs  T(C): 37.1 (13 Jan 2025 10:45), Max: 37.8 (12 Jan 2025 13:45)  T(F): 98.8 (13 Jan 2025 10:45), Max: 100 (12 Jan 2025 13:45)  HR: 66 (13 Jan 2025 10:45) (62 - 79)  BP: 98/66 (13 Jan 2025 10:45) (98/66 - 143/72)  BP(mean): 76 (13 Jan 2025 10:45) (69 - 92)  RR: 20 (13 Jan 2025 10:45) (13 - 36)  SpO2: 98% (13 Jan 2025 10:45) (98% - 100%)        General: alert  oriented x ____    lethargic distressed cachexia  nonverbal  unarousable verbal    Palliative Performance Scale/Karnofsky Score:  http://[email or web address].org/files/news/palliative_performance_scale_ppsv2.pdf    HEENT: no abnormal lesion, dry mouth  ET tube/trach oral lesions:  Lungs: tachypnea/labored breathing, audible excessive secretions  CV: RRR, S1S2, tachycardia  GI: soft non distended non tender  incontinent               PEG/NG/OG tube  constipation  last BM:   : incontinent  oliguria/anuria  ambrocio  Musculoskeletal: weakness x4 edema x4    ambulatory with assistance   mostly/fully bedbound/wheelchair bound  Skin: no abnormal skin lesions, poor skin turgor, pressure ulcer stage:   Neuro: no deficits, mild cognitive impairment dsyphagia/dysarthria paresis  Oral intake ability: unable/only mouth care, minimal moderate full capability    LABS:                        10.0   28.26 )-----------( 103      ( 13 Jan 2025 03:22 )             32.1     01-13    135  |  108  |  63[H]  ----------------------------<  171[H]  5.7[H]   |  15[L]  |  5.60[H]    Ca    7.3[L]      13 Jan 2025 03:22  Phos  6.4     01-13  Mg     2.3     01-13    TPro  5.5[L]  /  Alb  1.9[L]  /  TBili  0.4  /  DBili  x   /  AST  151[H]  /  ALT  160[H]  /  AlkPhos  94  01-13    Urinalysis Basic - ( 13 Jan 2025 03:22 )    Color: x / Appearance: x / SG: x / pH: x  Gluc: 171 mg/dL / Ketone: x  / Bili: x / Urobili: x   Blood: x / Protein: x / Nitrite: x   Leuk Esterase: x / RBC: x / WBC x   Sq Epi: x / Non Sq Epi: x / Bacteria: x        RADIOLOGY & ADDITIONAL STUDIES:         [facility] Geriatric and Palliative Consult Service:  Dora Luma DO: cell ([phone])  Reggie Neff MD: cell ([phone])  Jignesh Leach NP: cell ([phone])   Jessica Fleischer-Black MD: cell ([phone])  Christiano Ross SW: cell ([phone])       Can contact any Palliative Team member via Microsoft Teams for consults and questions      HPI:  This is an 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 Not eating, feeling nauseous. He noticed yellow sputum with intermittent cough and excessive salivation. Denies any chest pain, shortness of breath, fevers, diarrhea, vomiting. Patient has constipation at baseline, Son and aid has similar symptoms at home. Patient unable to provide history.    In ED  pt on NRB> obtunded> ETT  s/p vanc and zosyn   Trop 25k  (10 Raudel 2025 17:14)      PAST MEDICAL & SURGICAL HISTORY:  Hypothyroid      Hyperlipidemia      Oral Cancer      Basal Cell Cancer  biopsy of nose  5/2010      Malignant neoplasm of mandible  left- 2008      Malignant neoplasm of tongue      Radiation therapy complication  burn to left neck area      History of radiation therapy  2008      Radiation dermatitis  left neck      Benign essential HTN      Polymyalgia rheumatica      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      Hardware complicating wound infection  S/P removal of hardware 6/2008      S/P biopsy  tongue lesion 7/2014      H/O right breast biopsy  1980's      History of cataract  left and right ; 6/2017, 12/2017          SOCIAL HISTORY:    Admitted from:  home  (with HHA)           assisted living          [facility]       LTC   [ none ] Substance abuse, [ none ] Tobacco hx, [ none ] Alcohol hx, [ none ] Home Opioid Hx    FAMILY HISTORY:  Family history of MI (myocardial infarction) (Father)     unable to obtain from patient due to poor mentation, family unable to give information, see H&P for history  Baseline ADLs (prior to admission):    Allergies    No Known Allergies    Intolerances      Present Symptoms: Mild, Moderate, Severe  Pain:             Location -                               Aggravating factors -             Quality -             Radiation -             Timing-             Severity (0-10 scale):             Minimal acceptable level (0-10 scale):  Fatigue:  Nausea:  Lack of Appetite:   SOB:  Depression:  Anxiety:  Review of Systems: [All others negative or Unable to obtain due to poor mentation]    CPOT:    https://www.[facility].org/getattachment/[email or web address]/Critical-Care-Pain-Observation-Tool-(CPOT)  PAIN AD Score:   http://geriatrictoolkit.[facility]/cog/painad.pdf (press ctrl +  left click to view)      MEDICATIONS  (STANDING):  aspirin  chewable 81 milliGRAM(s) Oral daily  cefTRIAXone   IVPB 2000 milliGRAM(s) IV Intermittent every 24 hours  chlorhexidine 0.12% Liquid 15 milliLiter(s) Oral Mucosa every 12 hours  chlorhexidine 2% Cloths 1 Application(s) Topical <User Schedule>  fentaNYL   Infusion 0.5 MICROgram(s)/kG/Hr (3.35 mL/Hr) IV Continuous <Continuous>  heparin   Injectable 5000 Unit(s) SubCutaneous every 8 hours  hydrocortisone sodium succinate Injectable 50 milliGRAM(s) IV Push every 6 hours  levothyroxine Injectable 60 MICROGram(s) IV Push at bedtime  norepinephrine Infusion 1.2 MICROgram(s)/kG/Min (75.8 mL/Hr) IV Continuous <Continuous>  pantoprazole  Injectable 40 milliGRAM(s) IV Push daily  sodium bicarbonate 650 milliGRAM(s) Oral every 6 hours    MEDICATIONS  (PRN):  sodium chloride 0.9% lock flush 10 milliLiter(s) IV Push every 1 hour PRN Pre/post blood products, medications, blood draw, and to maintain line patency      PHYSICAL EXAM:  Vital Signs Last 24 Hrs  T(C): 37.1 (13 Jan 2025 10:45), Max: 37.8 (12 Jan 2025 13:45)  T(F): 98.8 (13 Jan 2025 10:45), Max: 100 (12 Jan 2025 13:45)  HR: 66 (13 Jan 2025 10:45) (62 - 79)  BP: 98/66 (13 Jan 2025 10:45) (98/66 - 143/72)  BP(mean): 76 (13 Jan 2025 10:45) (69 - 92)  RR: 20 (13 Jan 2025 10:45) (13 - 36)  SpO2: 98% (13 Jan 2025 10:45) (98% - 100%)        General: alert  oriented x ____    lethargic distressed cachexia  nonverbal  unarousable verbal    Palliative Performance Scale/Karnofsky Score:  http://[email or web address].org/files/news/palliative_performance_scale_ppsv2.pdf    HEENT: no abnormal lesion, dry mouth  ET tube/trach oral lesions:  Lungs: tachypnea/labored breathing, audible excessive secretions  CV: RRR, S1S2, tachycardia  GI: soft non distended non tender  incontinent               PEG/NG/OG tube  constipation  last BM:   : incontinent  oliguria/anuria  ambrocio  Musculoskeletal: weakness x4 edema x4    ambulatory with assistance   mostly/fully bedbound/wheelchair bound  Skin: no abnormal skin lesions, poor skin turgor, pressure ulcer stage:   Neuro: no deficits, mild cognitive impairment dsyphagia/dysarthria paresis  Oral intake ability: unable/only mouth care, minimal moderate full capability    LABS:                        10.0   28.26 )-----------( 103      ( 13 Jan 2025 03:22 )             32.1     01-13    135  |  108  |  63[H]  ----------------------------<  171[H]  5.7[H]   |  15[L]  |  5.60[H]    Ca    7.3[L]      13 Jan 2025 03:22  Phos  6.4     01-13  Mg     2.3     01-13    TPro  5.5[L]  /  Alb  1.9[L]  /  TBili  0.4  /  DBili  x   /  AST  151[H]  /  ALT  160[H]  /  AlkPhos  94  01-13    Urinalysis Basic - ( 13 Jan 2025 03:22 )    Color: x / Appearance: x / SG: x / pH: x  Gluc: 171 mg/dL / Ketone: x  / Bili: x / Urobili: x   Blood: x / Protein: x / Nitrite: x   Leuk Esterase: x / RBC: x / WBC x   Sq Epi: x / Non Sq Epi: x / Bacteria: x        RADIOLOGY & ADDITIONAL STUDIES:         [facility] Geriatric and Palliative Consult Service:  Dora Luma DO: cell [phone])  Reggie Neff MD: cell ([phone])  Jignesh Leach NP: cell ([phone])   Jessica Fleischer-Black MD: cell ([phone])  Christiano Ross SW: cell ([phone])       Can contact any Palliative Team member via Microsoft Teams for consults and questions      HPI:  This is an 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 Not eating, feeling nauseous. He noticed yellow sputum with intermittent cough and excessive salivation. Denies any chest pain, shortness of breath, fevers, diarrhea, vomiting. Patient has constipation at baseline, Son and aid has similar symptoms at home. Patient unable to provide history.    In ED  pt on NRB> obtunded> ETT  s/p vanc and zosyn   Trop 25k  (10 Raudel 2025 17:14)    Interval hx:  Patient was intubated for airway protection and started on mechanical ventilation w pressor support.  Family at the bedside.    PAST MEDICAL & SURGICAL HISTORY:  Hypothyroid      Hyperlipidemia      Oral Cancer      Basal Cell Cancer  biopsy of nose  5/2010      Malignant neoplasm of mandible  left- 2008      Malignant neoplasm of tongue      Radiation therapy complication  burn to left neck area      History of radiation therapy  2008      Radiation dermatitis  left neck      Benign essential HTN      Polymyalgia rheumatica      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      Hardware complicating wound infection  S/P removal of hardware 6/2008      S/P biopsy  tongue lesion 7/2014      H/O right breast biopsy  1980's      History of cataract  left and right ; 6/2017, 12/2017          SOCIAL HISTORY:    Admitted from:  home with family has HHA  Pt is  has 3 children, they make decision as a family  [ none ] Substance abuse, [ none ] Tobacco hx, [ none ] Alcohol hx, [ none ] Home Opioid Hx    FAMILY HISTORY:  Family history of MI (myocardial infarction) (Father)     unable to obtain from patient due to poor mentation, family unable to give information, see H&P for history  Baseline ADLs (prior to admission): required extensive assist with ADLs    Allergies    No Known Allergies    Intolerances      Present Symptoms: Mild, Moderate, Severe   Unable to obtain due to poor mentation]    CPOT:    https://www.[facility].org/getattachment/[email or web address]/Critical-Care-Pain-Observation-Tool-(CPOT) 0  PAIN AD Score:   http://geriatrictoolkit.[facility]/cog/painad.pdf (press ctrl +  left click to view)      MEDICATIONS  (STANDING):  aspirin  chewable 81 milliGRAM(s) Oral daily  cefTRIAXone   IVPB 2000 milliGRAM(s) IV Intermittent every 24 hours  chlorhexidine 0.12% Liquid 15 milliLiter(s) Oral Mucosa every 12 hours  chlorhexidine 2% Cloths 1 Application(s) Topical <User Schedule>  fentaNYL   Infusion 0.5 MICROgram(s)/kG/Hr (3.35 mL/Hr) IV Continuous <Continuous>  heparin   Injectable 5000 Unit(s) SubCutaneous every 8 hours  hydrocortisone sodium succinate Injectable 50 milliGRAM(s) IV Push every 6 hours  levothyroxine Injectable 60 MICROGram(s) IV Push at bedtime  norepinephrine Infusion 1.2 MICROgram(s)/kG/Min (75.8 mL/Hr) IV Continuous <Continuous>  pantoprazole  Injectable 40 milliGRAM(s) IV Push daily  sodium bicarbonate 650 milliGRAM(s) Oral every 6 hours    MEDICATIONS  (PRN):  sodium chloride 0.9% lock flush 10 milliLiter(s) IV Push every 1 hour PRN Pre/post blood products, medications, blood draw, and to maintain line patency      PHYSICAL EXAM:  Vital Signs Last 24 Hrs  T(C): 37.1 (13 Jan 2025 10:45), Max: 37.8 (12 Jan 2025 13:45)  T(F): 98.8 (13 Jan 2025 10:45), Max: 100 (12 Jan 2025 13:45)  HR: 66 (13 Jan 2025 10:45) (62 - 79)  BP: 98/66 (13 Jan 2025 10:45) (98/66 - 143/72)  BP(mean): 76 (13 Jan 2025 10:45) (69 - 92)  RR: 20 (13 Jan 2025 10:45) (13 - 36)  SpO2: 98% (13 Jan 2025 10:45) (98% - 100%)        General: acritically ill appearing elderly woman, intubated ,sedated.  NAD    Palliative Performance Scale/Karnofsky Score: 10%  http://[facility].org/files/news/palliative_performance_scale_ppsv2.pdf    HEENT: temporal wasting, +ETT,   Lungs: intubated vented  CV: RRR, S1S2  GI: soft non distended non tender  incontinent                 last BM:   :   ambrocio  Musculoskeletal: weakness x4, bedbound, b/l UE edema  Skin: no abnormal skin lesions, poor skin turgor  Neuro: no deficits, unable to follow commands  Oral intake ability: unable/only mouth care    LABS:                        10.0   28.26 )-----------( 103      ( 13 Jan 2025 03:22 )             32.1     01-13    135  |  108  |  63[H]  ----------------------------<  171[H]  5.7[H]   |  15[L]  |  5.60[H]    Ca    7.3[L]      13 Jan 2025 03:22  Phos  6.4     01-13  Mg     2.3     01-13    TPro  5.5[L]  /  Alb  1.9[L]  /  TBili  0.4  /  DBili  x   /  AST  151[H]  /  ALT  160[H]  /  AlkPhos  94  01-13    Urinalysis Basic - ( 13 Jan 2025 03:22 )    Color: x / Appearance: x / SG: x / pH: x  Gluc: 171 mg/dL / Ketone: x  / Bili: x / Urobili: x   Blood: x / Protein: x / Nitrite: x   Leuk Esterase: x / RBC: x / WBC x   Sq Epi: x / Non Sq Epi: x / Bacteria: x    < from: CT Chest No Cont (01.10.25 @ 17:58) >    ACC: [id number] EXAM:  CT ABDOMEN AND PELVIS   ORDERED BY: ROBB ROMERO     ACC: [id number] EXAM:  CT CHEST   ORDERED BY: ROBB ROMERO     PROCEDURE DATE:  01/10/2025          INTERPRETATION:  CLINICAL INFORMATION: sepsis unknow etiology rule out   pNA Admitting Dxs: A41.9 SEPSIS, UNSPECIFIED ORGANISM HCT    COMPARISON: 11.20.24.    CONTRAST/COMPLICATIONS:  IV Contrast: NONE  Oral Contrast: NONE  .    PROCEDURE:  CT of the Chest, Abdomen and Pelvis was performed.  Sagittal and coronal reformats were performed.    FINDINGS:  CHEST:  LUNGS AND LARGE AIRWAYS: Patent central airways. Left lower lobe mucous   plugging and airspace opacity. Ill-defined opacity in the right upper   lobe. Endotracheal tube is in place above the katelin.  PLEURA: No pleural effusion.  VESSELS: Within normal limits.  HEART: Heart size is normal.  No pericardial effusion.  MEDIASTINUM AND NEDA: No lymphadenopathy.  CHEST WALL AND LOWER NECK: Within normal limits.    ABDOMEN AND PELVIS:  LIVER: Within normal limits.  BILE DUCTS: Normal caliber.  GALLBLADDER: Within normal limits.  SPLEEN: Within normal limits.  PANCREAS: Within normal limits.  ADRENALS: Within normal limits.  KIDNEYS/URETERS: Right renal cyst. Punctate right renal calculus.    BLADDER: Within normal limits.  REPRODUCTIVE ORGANS: Within normal limits.    BOWEL: No bowel obstruction. Gaseous distention of the stomach.  PERITONEUM/RETROPERITONEUM: Within normal limits.  VESSELS:  Within normal limits.  ABDOMINAL WALL: Within normal limits.  BONES: Left femoral fixation hardware. Chronic rib fractures.    IMPRESSION: Left lower lobe mucous plugging and airspace opacity.   Ill-defined opacity in the right upper lobe of uncertain etiology.    Gaseous distention of the stomach.    < end of copied text >      RADIOLOGY & ADDITIONAL STUDIES: reviewed  ADVANCED DIRECTIVES: MOLST: DNR/DNI/comfort measures only/no HD

## 2025-01-13 NOTE — CONSULT NOTE ADULT - PROBLEM SELECTOR RECOMMENDATION 4
Clinical evidence indicates that the patient has Severe protein calorie malnutrition/ 3rd degree. Albumin 1.9 poor pro intake prior to admission     In context of    Chronic Illness (>1 month)    Energy/Food intake <50% of estimated energy requirement >5 days  Weight loss: Moderate - severe (lbs lost recently)  Body Fat loss: Severe   (Cachexia, temporal wasting, contracted, muscle atrophy)  Muscle mass loss: Severe  (Skin failure/pressure ulcers)  Fluid Accumulation: Severe (Fluid overload, ascites, pleural effusions)   Strength: weakened severe (bedbound)    Recommend:   pleasure feeds as tolerated - aspiration precautions, careful hand-feeding, teaching to caregivers  nutritional supplements as tolerated    comfort only

## 2025-01-13 NOTE — PROGRESS NOTE ADULT - ASSESSMENT
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 No hemorrhage. 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  on fentanyl drip    Cardiovascular:  #Septic shock   s/p 2 L LR  -on Solucortef 50 Q6H  now on levophed to maintain MAP > 65  wean as tolerated     #NSTEMI  type 2 2/2 sepsis   EKG nn ischemic changes   Trend troponin   No Heparin drip as per Dr. Orellana    #Carotid stenosis   on Plavix   c/w home med    Pulmonary:   #Intubated for airway protection   f/u CT chest   SBT per protocol     Infectious Diseases:  #Meets SIRS criteria   unknown origin   flu/cpvid/ RSV neg  CXR no opacity   f/u Bcx, Ua, Ucx   f/u CT chest abdomen and pelvis  s/p vanc and zosyn   c/w current abx     Gastrointestinal:  #Tongue and jaw cancer  s/p chemo and radiotherapy years ago     #NGT for feeds       #Transaminitis   likely 2/2 sepsis   LFTs stable    Renal:  #Metabolic Acidosis   #Lactic acidosis   2/2 spesis   -worsening acidosis  -started Na HCO3 650 Q6H    #AKSHAT   creatinine 3.33, baseline 0.86  likely pre-renal in setting of poor oral intake   Monitor BMP  Montaño catheter      Heme/onc:   #Anemia   no signs of bleeding   monitor    transfuse for Hb < 8    Endo:   #Hypothyroid  on synth 125mg   start IV 1/2 dose   TSH: 0.37    #Fibromyalgia   on prednisone for 1 yr  on Solu-cortef 50 q6      Skin/ catheter:   #Montaño   #peripheral IVs    Prophylaxis:   #GI ppx with pantoprazole   #DVT ppx with Hep sq    Goals of Care: Full code     Dispo:

## 2025-01-13 NOTE — PHARMACOTHERAPY INTERVENTION NOTE - COMMENTS
Patient identified by [facility] for Patients 66 y/o and Older Report. No intervention at this time.  
Culture Date/Time: 2025-01-10 12:40  BCID: -  Escherichia coli  Detec  ---------------------------------  Method Type  LUCAS  -  Trimethoprim/Sulfamethoxazole  S <=0.5/9.5  -  Tobramycin  S <=2  -  Piperacillin/Tazobactam  S <=8  -  Meropenem  S <=1  -  Levofloxacin  S <=0.5  -  Imipenem  S <=1  -  Gentamicin  S <=2  -  Ertapenem  S <=0.5  -  Ciprofloxacin  S <=0.25  -  Ceftriaxone  S <=1  -  Cefoxitin  S <=8  -  Cefepime  S <=2  -  Cefazolin  S <=2  -  Aztreonam  S <=4  -  Ampicillin/Sulbactam  S <=4/2  -  Ampicillin  S <=8 These ampicillin results predict results for amoxicillin    Antibiotics:  piperacillin-tazobactam  vancomycin    E. coli bacteremia. MRSA swab negative. Recommended discontinuing vancomycin.  
Patient identified by [facility] for Patients 64 y/o and Older Report.    Informed provider that pain goal is missing

## 2025-01-13 NOTE — CONSULT NOTE ADULT - PROBLEM SELECTOR RECOMMENDATION 5
Bedbound. Requires 24/h care. High risk for skin failure  Supportive care  Freq positioning    Comfort only

## 2025-01-13 NOTE — CONSULT NOTE ADULT - PROBLEM SELECTOR RECOMMENDATION 3
likely ATN 2/2 septic shock, on pressor support w worsening BUN/Cr, GFR 7.  Montaño in place.  Nephrology following    Avoid nephrotoxic medications/NSAIDs

## 2025-01-13 NOTE — PROGRESS NOTE ADULT - SUBJECTIVE AND OBJECTIVE BOX
[facility] : Progress Note :: [phone], (office [phone]),   Dr Menjivar / Dr Sanchez / Dr Skelton / Dr Cordova / Dr Walker BREAUX / Dr Welsh / Dr Cabello / Dr Av jacobs  _____________________________________________________________________________________________  remains intubated, in septic shock  oligo-anuric AKSHAT  D/W palliative  care team, PT's  family do not want dialysis and further discussion on ultimate goals of care ongoing     No Known Allergies    Hospital Medications:   MEDICATIONS  (STANDING):  aspirin  chewable 81 milliGRAM(s) Oral daily  cefTRIAXone   IVPB 2000 milliGRAM(s) IV Intermittent every 24 hours  chlorhexidine 0.12% Liquid 15 milliLiter(s) Oral Mucosa every 12 hours  chlorhexidine 2% Cloths 1 Application(s) Topical <User Schedule>  fentaNYL   Infusion 0.5 MICROgram(s)/kG/Hr (3.35 mL/Hr) IV Continuous <Continuous>  heparin   Injectable 5000 Unit(s) SubCutaneous every 8 hours  hydrocortisone sodium succinate Injectable 50 milliGRAM(s) IV Push every 6 hours  levothyroxine Injectable 60 MICROGram(s) IV Push at bedtime  norepinephrine Infusion 1.2 MICROgram(s)/kG/Min (75.8 mL/Hr) IV Continuous <Continuous>  pantoprazole  Injectable 40 milliGRAM(s) IV Push daily  sodium bicarbonate 650 milliGRAM(s) Oral every 6 hours        VITALS:  T(F): 98.8 (01-13-25 @ 15:00), Max: 100 (01-12-25 @ 17:30)  HR: 64 (01-13-25 @ 15:00)  BP: 99/62 (01-13-25 @ 14:45)  RR: 20 (01-13-25 @ 15:00)  SpO2: 98% (01-13-25 @ 15:00)  Wt(kg): --    01-12 @ 07:01 - 01-13 @ 07:00  --------------------------------------------------------  IN: 1030.8 mL / OUT: 45 mL / NET: 985.8 mL    01-13 @ 07:01 - 01-13 @ 15:45  --------------------------------------------------------  IN: 88.7 mL / OUT: 0 mL / NET: 88.7 mL        PHYSICAL EXAM:  Constitutional: intubated, on vent   HEENT: anicteric sclera, oropharynx clear,  Neck: No JVD  Respiratory: CTAB, no wheezes, rales or rhonchi  Cardiovascular: S1, S2, RRR  Gastrointestinal: BS+, soft, NT/ND  Extremities: peripheral edema  : No CVA tenderness.  ambrocio+      LABS:  01-13    137  |  110[H]  |  74[H]  ----------------------------<  189[H]  5.6[H]   |  14[L]  |  5.80[H]    Ca    7.0[L]      13 Jan 2025 12:42  Phos  6.3     01-13  Mg     2.4     01-13    TPro  5.5[L]  /  Alb  1.9[L]  /  TBili  0.4  /  DBili      /  AST  151[H]  /  ALT  160[H]  /  AlkPhos  94  01-13    Creatinine Trend: 5.80 <--, 5.60 <--, 5.42 <--, 5.19 <--, 5.22 <--, 4.75 <--, 3.80 <--, 3.48 <--, 3.33 <--                        10.0   28.26 )-----------( 103      ( 13 Jan 2025 03:22 )             32.1     Urine Studies:  Urinalysis Basic - ( 13 Jan 2025 12:42 )    Color:  / Appearance:  / SG:  / pH:   Gluc: 189 mg/dL / Ketone:   / Bili:  / Urobili:    Blood:  / Protein:  / Nitrite:    Leuk Esterase:  / RBC:  / WBC    Sq Epi:  / Non Sq Epi:  / Bacteria:         RADIOLOGY & ADDITIONAL STUDIES:

## 2025-01-13 NOTE — PROGRESS NOTE ADULT - ATTENDING COMMENTS
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, hypothyroidism, polymyalgia on prednisone, Presented to ED for altered mentation Admitted for septic shock with unknown origin                     ASSESSMENT     - Septic shock   - Bacteremia   - AMS / Encephalopathy   - Acute hypoxic resp failure   - NSTEMI   - MOSF   - AKSHAT   - Severe metabolic acidosis  - HTN HLD   - Throat / mandible ca   - Carotid stenosis   - Polymyalgia       Plan       - Sedated and pain meds   - CT head pend  -Vent support , adjust per ABG   - Hemodynamic support   - Shock resistant to ivf    - Vaso-active agent titrate to maintain MAP>65  - 2 DECHO noted   - Trend troponin due to sepsis   - Cards eval  Dr Orellana   - No beta- or ACE- due to shock   - AKSHAT progressivng worse   - Family refuse DH    - Stress dose steroid sine pat is on chronic prednisone   - Trend trop uptrend   - Broad spec antibx   - Repeat BC 1/13  - Monitor blood glucose with coverage   - Monitor LFT   - Trend Lactate remain elevated due to liver injury   - Montaño cath   - Monitor renal fx'  - Check urine lytes . US   - Neph eval   - DVT GI prophy.  - DNR .   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 coli   - AMS / Encephalopathy   - Acute hypoxic resp failure   - NSTEMI   - MOSF   - AKSHAT   - Severe metabolic acidosis  - HTN HLD   - Throat / mandible ca   - Carotid stenosis   - Polymyalgia       Plan       - Sedated and pain meds   - CT head pend  -Vent support , adjust per ABG   - Hemodynamic support   - Shock resistant to ivf    - Vaso-active agent titrate to maintain MAP>65  - 2 DECHO noted   - Trend troponin due to sepsis   - Cards eval  Dr Orellana   - No beta- or ACE- due to shock   - AKSHAT progressivng worse   - Family refuse DH    - Stress dose steroid sine pat is on chronic prednisone   - Trend trop uptrend   - Antibx change to Rocephin   - Repeat BC 1/13  - Monitor blood glucose with coverage   - Monitor LFT   - Trend Lactate remain elevated due to liver injury   - Montaño cath   - Monitor renal fx'  - Check urine lytes . US   - Neph eval   - DVT GI prophy.  - DNR .   - Daughter express interest in comfort care but want to wait for rest of family members to arrive at bedside

## 2025-01-13 NOTE — CONSULT NOTE ADULT - PROBLEM SELECTOR RECOMMENDATION 6
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  Family agreed for liberation of the ETT.  They wish to provide comfort focused care/hospice.  LOR drafted: DNR/DNI/no HD/comfort measures only.  Please see discussion noted above in GOC conversation

## 2025-01-13 NOTE — CONSULT NOTE ADULT - PROBLEM SELECTOR RECOMMENDATION 2
intubated/sedated on pressor support for airway protection of unknown origin. Intubated/sedated on pressor support. c/w abx  Family wishes primary focus on comfort measures only.    OLEG drafted: DNR/DNI/no HD/comfort measures only

## 2025-01-14 ENCOUNTER — APPOINTMENT (OUTPATIENT)
Dept: SURGERY | Facility: CLINIC | Age: 85
End: 2025-01-14

## 2025-01-15 LAB
FUNGITELL: 51 PG/ML — SIGNIFICANT CHANGE UP
GRAM STN FLD: ABNORMAL

## 2025-01-16 LAB
CULTURE RESULTS: ABNORMAL
GALACTOMANNAN AG SERPL-ACNC: 0.03 INDEX — SIGNIFICANT CHANGE UP (ref 0–0.49)
SPECIMEN SOURCE: SIGNIFICANT CHANGE UP

## 2025-01-17 LAB
CULTURE RESULTS: SIGNIFICANT CHANGE UP
SPECIMEN SOURCE: SIGNIFICANT CHANGE UP

## 2025-03-11 NOTE — H&P ADULT - NSICDXFAMILYHX_GEN_ALL_CORE_FT
FAMILY HISTORY:  Father  Still living? No  Family history of MI (myocardial infarction), Age at diagnosis: Age Unknown     normal

## 2025-03-20 ENCOUNTER — APPOINTMENT (OUTPATIENT)
Dept: ORTHOPEDIC SURGERY | Facility: CLINIC | Age: 85
End: 2025-03-20

## 2025-03-28 ENCOUNTER — APPOINTMENT (OUTPATIENT)
Dept: NEUROLOGY | Facility: CLINIC | Age: 85
End: 2025-03-28

## 2025-05-09 NOTE — H&P PST ADULT - LIVES WITH, PROFILE
CC:  Kat Jorge is here today for fatigue (Extreme), Migraine, and Bruise (Easily/)      Medications: medications verified, no change  Added preferred pharmacy  Refills needed today?  YES    denies Latex allergy or sensitivity       Health Maintenance       Pneumococcal Vaccine 0-49 (1 of 1 - PPSV23)  Overdue since 6/22/2006    Chlamydia and Gonorrhea Screening (if sexually active) (Yearly)  Overdue since 1/5/2024    COVID-19 Vaccine (5 - 2024-25 season)  Overdue since 9/1/2024           Following review of the above:  Patient is not proceeding with: Chlamydia and Gonorrhea Screening, COVID-19, and Pneumococcal    Note: Refer to final orders and clinician documentation.           Recent PHQ 2/9 Score    PHQ 2:  PHQ 2 Score Adult PHQ 2 Score Adult PHQ 2 Interpretation Little interest or pleasure in activity?   5/9/2025   2:38 PM 0 No further screening needed 0       PHQ 9:  PHQ 9 Score Adult PHQ 9 Score Adult PHQ 9 Interpretation   10/19/2023   9:06 AM 4 Minimal Depression     PHQ-2/9 Depression Screening  Little interest or pleasure in activity?: Not at all  Feeling down, depressed or hopeless?: Not at all  Initial depression screening score:: 0  PHQ2 Interpretation: No further screening needed     Patient would like communication of their results via:      Cell Phone:   Telephone Information:   Mobile [phone]     Okay to leave a message containing results? Yes     spouse

## (undated) DEVICE — VENODYNE/SCD SLEEVE CALF MEDIUM

## (undated) DEVICE — ELCTR BOVIE PENCIL SMOKE EVACUATION

## (undated) DEVICE — POSITIONER STRAP ARMBOARD VELCRO TS-30

## (undated) DEVICE — SOL IRR POUR NS 0.9% 1500ML

## (undated) DEVICE — GLV 7.5 PROTEXIS (BLUE)

## (undated) DEVICE — DRAPE 3/4 SHEET 52X76"

## (undated) DEVICE — DRSG COBAN 4" LF NONSTERILE

## (undated) DEVICE — DRSG WEBRIL 4"

## (undated) DEVICE — STAPLER SKIN VISI-STAT 35 WIDE

## (undated) DEVICE — DRAPE SHOWER CURTAIN ISOLATION

## (undated) DEVICE — SUT VICRYL 1 36" CTX UNDYED

## (undated) DEVICE — PREP SCRUB BRUSH W CHG 4%

## (undated) DEVICE — DRILL BIT STRYKER ORTHO LOKG 4.2X360MM

## (undated) DEVICE — PACK LIJ BASIC ORTHO

## (undated) DEVICE — LAP PAD W RING 18 X 18"

## (undated) DEVICE — GOWN XXL

## (undated) DEVICE — SUT VICRYL PLUS 0 27" OS-6 UNDYED

## (undated) DEVICE — SUCTION YANKAUER NO CONTROL VENT

## (undated) DEVICE — LIJ-GAMMA NAIL OPTIONAL INST TRAY: Type: DURABLE MEDICAL EQUIPMENT

## (undated) DEVICE — SUT MONOCRYL 4-0 27" PS-2 UNDYED

## (undated) DEVICE — SUT VICRYL PLUS 2-0 27" FS-1 UNDYED

## (undated) DEVICE — DRAPE U POLY BLUE 60"X60"

## (undated) DEVICE — ELCTR GROUNDING PAD ADULT COVIDIEN

## (undated) DEVICE — PREP CHLORAPREP HI-LITE ORANGE 26ML

## (undated) DEVICE — LABELS BLANK W PEN

## (undated) DEVICE — TAPE SILK 3"

## (undated) DEVICE — GLV 7 PROTEXIS (WHITE)

## (undated) DEVICE — SOL IRR POUR H2O 1500ML